# Patient Record
Sex: MALE | Race: WHITE | NOT HISPANIC OR LATINO | Employment: FULL TIME | URBAN - METROPOLITAN AREA
[De-identification: names, ages, dates, MRNs, and addresses within clinical notes are randomized per-mention and may not be internally consistent; named-entity substitution may affect disease eponyms.]

---

## 2017-04-26 DIAGNOSIS — I10 HYPERTENSION, BENIGN ESSENTIAL, GOAL BELOW 140/90: ICD-10-CM

## 2017-04-27 NOTE — TELEPHONE ENCOUNTER
Lisinopril      Last Written Prescription Date: 9/16/16  Last Fill Quantity: 90, # refills: 0  Last Office Visit with G, P or [facility] prescribing provider: 4/18/16  leonardo ladd         Potassium   Date Value Ref Range Status   04/18/2016 4.1 3.4 - 5.3 mmol/L Final     Creatinine   Date Value Ref Range Status   04/18/2016 0.86 0.66 - 1.25 mg/dL Final     BP Readings from Last 3 Encounters:   04/18/16 108/84   11/12/15 144/88   10/12/15 158/90

## 2017-04-28 RX ORDER — LISINOPRIL 10 MG/1
TABLET ORAL
Qty: 30 TABLET | Refills: 0 | Status: SHIPPED | OUTPATIENT
Start: 2017-04-28 | End: 2017-06-25

## 2017-04-28 NOTE — TELEPHONE ENCOUNTER
Reception please call patient to come in for annual blood pressure check and labs.  1 month of med sent to pharmacy per RN protocol.  Milagro Steele RN

## 2017-06-25 DIAGNOSIS — I10 HYPERTENSION, BENIGN ESSENTIAL, GOAL BELOW 140/90: ICD-10-CM

## 2017-06-26 RX ORDER — LISINOPRIL 10 MG/1
10 TABLET ORAL DAILY
Qty: 30 TABLET | Refills: 0 | Status: SHIPPED | OUTPATIENT
Start: 2017-06-26 | End: 2017-07-03

## 2017-06-26 NOTE — TELEPHONE ENCOUNTER
Patient received evelyn refill in [place]  Routing to provider and scheduling  Pended 1 month supply  Please call patient to schedule medication and BP check.  Milagro Steele RN

## 2017-06-26 NOTE — TELEPHONE ENCOUNTER
Medication Detail      Disp Refills Start End MOHINDER   lisinopril (PRINIVIL/ZESTRIL) 10 MG tablet 30 tablet 0 4/28/2017  No   Sig: TAKE ONE TABLET BY MOUTH ONE TIME DAILY       Last Office Visit with [facility], P or [facility] prescribing provider: 4/18/16       Potassium   Date Value Ref Range Status   04/18/2016 4.1 3.4 - 5.3 mmol/L Final     Creatinine   Date Value Ref Range Status   04/18/2016 0.86 0.66 - 1.25 mg/dL Final     BP Readings from Last 3 Encounters:   04/18/16 108/84   11/12/15 144/88   10/12/15 158/90

## 2017-06-30 NOTE — PATIENT INSTRUCTIONS
1.  Update labs today  2.  Increase lisinopril to 20mg (take 2 210mg tabs) once a day.  Follow up in 1 week for blood pressure and lab check.  Might need to increase dose to 40mg and/or add second medication  3. Call Central Scheduling [phone] to schedule  colonoscopies.   4.  Consider my fitness pal phone britt, online weight watchers, or whole 30.  We also discussed possibility of weight loss surgery    Preventive Health Recommendations  Male Ages 50   64    Yearly exam:             See your health care provider every year in order to  o   Review health changes.   o   Discuss preventive care.    o   Review your medicines if your doctor has prescribed any.     Have a cholesterol test every 5 years, or more frequently if you are at risk for high cholesterol/heart disease.     Have a diabetes test (fasting glucose) every three years. If you are at risk for diabetes, you should have this test more often.     Have a colonoscopy at age 50, or have a yearly FIT test (stool test). These exams will check for colon cancer.      Talk with your health care provider about whether or not a prostate cancer screening test (PSA) is right for you.    You should be tested each year for STDs (sexually transmitted diseases), if you re at risk.     Shots: Get a flu shot each year. Get a tetanus shot every 10 years.     Nutrition:    Eat at least 5 servings of fruits and vegetables daily.     Eat whole-grain bread, whole-wheat pasta and brown rice instead of white grains and rice.     Talk to your provider about Calcium and Vitamin D.     Lifestyle    Exercise for at least 150 minutes a week (30 minutes a day, 5 days a week). This will help you control your weight and prevent disease.     Limit alcohol to one drink per day.     No smoking.     Wear sunscreen to prevent skin cancer.     See your dentist every six months for an exam and cleaning.     See your eye doctor every 1 to 2 years.

## 2017-07-03 ENCOUNTER — OFFICE VISIT (OUTPATIENT)
Dept: FAMILY MEDICINE | Facility: CLINIC | Age: 52
End: 2017-07-03
Payer: COMMERCIAL

## 2017-07-03 VITALS
BODY MASS INDEX: 42.59 KG/M2 | WEIGHT: 297.5 LBS | TEMPERATURE: 98.4 F | OXYGEN SATURATION: 96 % | HEIGHT: 70 IN | DIASTOLIC BLOOD PRESSURE: 93 MMHG | SYSTOLIC BLOOD PRESSURE: 144 MMHG | HEART RATE: 95 BPM

## 2017-07-03 DIAGNOSIS — Z00.00 ENCOUNTER FOR ROUTINE ADULT HEALTH EXAMINATION WITHOUT ABNORMAL FINDINGS: Primary | ICD-10-CM

## 2017-07-03 DIAGNOSIS — E66.01 MORBID OBESITY, UNSPECIFIED OBESITY TYPE (H): ICD-10-CM

## 2017-07-03 DIAGNOSIS — Z13.6 CARDIOVASCULAR SCREENING; LDL GOAL LESS THAN 130: ICD-10-CM

## 2017-07-03 DIAGNOSIS — L30.9 ECZEMA, UNSPECIFIED TYPE: ICD-10-CM

## 2017-07-03 DIAGNOSIS — Z12.11 SCREEN FOR COLON CANCER: ICD-10-CM

## 2017-07-03 DIAGNOSIS — I10 HYPERTENSION, BENIGN ESSENTIAL, GOAL BELOW 140/90: ICD-10-CM

## 2017-07-03 LAB
ANION GAP SERPL CALCULATED.3IONS-SCNC: 9 MMOL/L (ref 3–14)
BUN SERPL-MCNC: 14 MG/DL (ref 7–30)
CALCIUM SERPL-MCNC: 9.3 MG/DL (ref 8.5–10.1)
CHLORIDE SERPL-SCNC: 105 MMOL/L (ref 94–109)
CHOLEST SERPL-MCNC: 176 MG/DL
CO2 SERPL-SCNC: 24 MMOL/L (ref 20–32)
CREAT SERPL-MCNC: 0.83 MG/DL (ref 0.66–1.25)
GFR SERPL CREATININE-BSD FRML MDRD: ABNORMAL ML/MIN/1.7M2
GLUCOSE SERPL-MCNC: 107 MG/DL (ref 70–99)
HDLC SERPL-MCNC: 58 MG/DL
LDLC SERPL CALC-MCNC: 68 MG/DL
NONHDLC SERPL-MCNC: 118 MG/DL
POTASSIUM SERPL-SCNC: 4.1 MMOL/L (ref 3.4–5.3)
SODIUM SERPL-SCNC: 138 MMOL/L (ref 133–144)
TRIGL SERPL-MCNC: 251 MG/DL

## 2017-07-03 PROCEDURE — 99396 PREV VISIT EST AGE 40-64: CPT | Performed by: NURSE PRACTITIONER

## 2017-07-03 PROCEDURE — 36415 COLL VENOUS BLD VENIPUNCTURE: CPT | Performed by: NURSE PRACTITIONER

## 2017-07-03 PROCEDURE — 80061 LIPID PANEL: CPT | Performed by: NURSE PRACTITIONER

## 2017-07-03 PROCEDURE — 80048 BASIC METABOLIC PNL TOTAL CA: CPT | Performed by: NURSE PRACTITIONER

## 2017-07-03 RX ORDER — LISINOPRIL 10 MG/1
20 TABLET ORAL DAILY
Qty: 30 TABLET | Refills: 1 | COMMUNITY
Start: 2017-07-03 | End: 2018-10-15

## 2017-07-03 RX ORDER — TRIAMCINOLONE ACETONIDE 1 MG/G
OINTMENT TOPICAL
Qty: 80 G | Refills: 1 | Status: SHIPPED | OUTPATIENT
Start: 2017-07-03 | End: 2017-12-11

## 2017-07-03 NOTE — NURSING NOTE
"Chief Complaint   Patient presents with     Physical       Initial BP (!) 144/93 (BP Location: Left arm, Patient Position: Chair, Cuff Size: Adult Large)  Pulse 95  Temp 98.4  F (36.9  C) (Oral)  Ht 5' 10\" (1.778 m)  Wt 297 lb 8 oz (134.9 kg)  SpO2 96%  BMI 42.69 kg/m2 Estimated body mass index is 42.69 kg/(m^2) as calculated from the following:    Height as of this encounter: 5' 10\" (1.778 m).    Weight as of this encounter: 297 lb 8 oz (134.9 kg).  Medication Reconciliation: complete     Giovanna Menjivar MA      "

## 2017-07-03 NOTE — MR AVS SNAPSHOT
After Visit Summary   7/3/2017    Aubrey Zavala    MRN: [id number]           Patient Information     Date Of Birth          1965        Visit Information        Provider Department      7/3/2017 8:40 AM Olivia Nunn APRN [facility]        Today's Diagnoses     Encounter for routine adult health examination without abnormal findings    -  1    Hypertension, benign essential, goal below 140/90        CARDIOVASCULAR SCREENING; LDL GOAL LESS THAN 130        Screen for colon cancer        Routine general medical examination at a health care facility        Eczema, unspecified type          Care Instructions    1.  Update labs today  2.  Increase lisinopril to 20mg (take 2 210mg tabs) once a day.  Follow up in 1 week for blood pressure and lab check.  Might need to increase dose to 40mg and/or add second medication  3. Call Central Scheduling [phone] to schedule  colonoscopies.   4.  Consider my fitness pal phone britt, online weight watchers, or whole 30.  We also discussed possibility of weight loss surgery    Preventive Health Recommendations  Male Ages 50 - 64    Yearly exam:             See your health care provider every year in order to  o   Review health changes.   o   Discuss preventive care.    o   Review your medicines if your doctor has prescribed any.     Have a cholesterol test every 5 years, or more frequently if you are at risk for high cholesterol/heart disease.     Have a diabetes test (fasting glucose) every three years. If you are at risk for diabetes, you should have this test more often.     Have a colonoscopy at age 50, or have a yearly FIT test (stool test). These exams will check for colon cancer.      Talk with your health care provider about whether or not a prostate cancer screening test (PSA) is right for you.    You should be tested each year for STDs (sexually transmitted diseases), if you re at risk.     Shots: Get a flu shot each year. Get  a tetanus shot every 10 years.     Nutrition:    Eat at least 5 servings of fruits and vegetables daily.     Eat whole-grain bread, whole-wheat pasta and brown rice instead of white grains and rice.     Talk to your provider about Calcium and Vitamin D.     Lifestyle    Exercise for at least 150 minutes a week (30 minutes a day, 5 days a week). This will help you control your weight and prevent disease.     Limit alcohol to one drink per day.     No smoking.     Wear sunscreen to prevent skin cancer.     See your dentist every six months for an exam and cleaning.     See your eye doctor every 1 to 2 years.            Follow-ups after your visit        Future tests that were ordered for you today     Open Future Orders        Priority Expected Expires Ordered    Basic metabolic panel Routine  7/3/2018 7/3/2017            Who to contact     If you have questions or need follow up information about today's clinic visit or your schedule please contact [facility] directly at [phone].  Normal or non-critical lab and imaging results will be communicated to you by ttwickhart, letter or phone within 4 business days after the clinic has received the results. If you do not hear from us within 7 days, please contact the clinic through [employer] or phone. If you have a critical or abnormal lab result, we will notify you by phone as soon as possible.  Submit refill requests through "[employer] or call your pharmacy and they will forward the refill request to us. Please allow 3 business days for your refill to be completed.          Additional Information About Your Visit        ttwickharFlip Flop ShopsÂ® Information     "[employer] gives you secure access to your electronic health record. If you see a primary care provider, you can also send messages to your care team and make appointments. If you have questions, please call your primary care clinic.  If you do not have a primary care provider, please call [phone] and they will assist  "you.        Care EveryWhere ID     This is your Care EveryWhere ID. This could be used by other organizations to access your [place] medical records  [id number]        Your Vitals Were     Pulse Temperature Height Pulse Oximetry BMI (Body Mass Index)       95 98.4  F (36.9  C) (Oral) 5' 10\" (1.778 m) 96% 42.69 kg/m2        Blood Pressure from Last 3 Encounters:   07/03/17 (!) 144/93   04/18/16 108/84   11/12/15 144/88    Weight from Last 3 Encounters:   07/03/17 297 lb 8 oz (134.9 kg)   04/18/16 300 lb (136.1 kg)   11/12/15 296 lb 4.8 oz (134.4 kg)              We Performed the Following     Basic metabolic panel     Lipid panel reflex to direct LDL          Today's Medication Changes          These changes are accurate as of: 7/3/17  9:20 AM.  If you have any questions, ask your nurse or doctor.               Start taking these medicines.        Dose/Directions    triamcinolone 0.1 % ointment   Commonly known as:  KENALOG   Used for:  Eczema, unspecified type   Started by:  Olivia Nunn APRN CNP        Apply sparingly to affected area three times daily for 14 days.   Quantity:  80 g   Refills:  1         These medicines have changed or have updated prescriptions.        Dose/Directions    lisinopril 10 MG tablet   Commonly known as:  PRINIVIL/ZESTRIL   This may have changed:  Another medication with the same name was removed. Continue taking this medication, and follow the directions you see here.   Changed by:  Olivia Nunn APRN CNP        Dose:  20 mg   Take 2 tablets (20 mg) by mouth daily   Quantity:  30 tablet   Refills:  1         Stop taking these medicines if you haven't already. Please contact your care team if you have questions.     cyclobenzaprine 10 MG tablet   Commonly known as:  FLEXERIL   Stopped by:  Olivia Nunn APRN CNP                Where to get your medicines      These medications were sent to [place] [address] [place] [address]  [address], [facility] [address]     Phone:  [phone]     triamcinolone 0.1 % ointment                Primary Care Provider Office Phone # Fax #    Lottie Jackson [phone]       [facility] [address]  [facility] [address]        Equal Access to Services     [facility] CenterZULEIKA : Hadii aad ku hadasho Soomaali, waaxda luqadaha, qaybta kaalmada adeegyada, waxay idiin hayaan adeeg kharash la'aan ah. So [facility] [phone].    ATENCIÓN: Si habla español, tiene a cullen disposición servicios gratuitos de asistencia lingüística. Jordan al [phone].    We comply with applicable federal civil rights laws and [place] laws. We do not discriminate on the basis of race, color, national origin, age, disability sex, sexual orientation or gender identity.            Thank you!     Thank you for choosing [facility]  for your care. Our goal is always to provide you with excellent care. Hearing back from our patients is one way we can continue to improve our services. Please take a few minutes to complete the written survey that you may receive in the mail after your visit with us. Thank you!             Your Updated Medication List - Protect others around you: Learn how to safely use, store and throw away your medicines at www.disposemymeds.org.          This list is accurate as of: 7/3/17  9:20 AM.  Always use your most recent med list.                   Brand Name Dispense Instructions for use Diagnosis    lisinopril 10 MG tablet    PRINIVIL/ZESTRIL    30 tablet    Take 2 tablets (20 mg) by mouth daily        triamcinolone 0.1 % ointment    KENALOG    80 g    Apply sparingly to affected area three times daily for 14 days.    Eczema, unspecified type

## 2017-07-03 NOTE — PROGRESS NOTES
SUBJECTIVE:   CC: Aubrey Zavala is an 52 year old male who presents for preventative health visit.     Physical   Annual:     Getting at least 3 servings of Calcium per day::  Yes    Bi-annual eye exam::  Yes    Dental care twice a year::  NO    Sleep apnea or symptoms of sleep apnea::  None    Diet::  Low salt and Vegetarian/vegan    Frequency of exercise::  None    Taking medications regularly::  Yes    Medication side effects::  None      History of hypertension, currently taking lisinopril without side effects.  Denies chest pain, palpitations, or shortness of breath.  Check at [employer], has been on the high side.  High blood pressure since age 31 but wasn't evaluated until later.  Brother also diagnosed at early age.  Does see eye doctor once a year.      No family history of colon cancer.    Eczema to left leg, intermittent, trying over the counter hydrocortisone without improvement    Has struggled with weight his whole life.  Trying to walk more.  Eats chicken and veggies.  Does diet pop.  Some convince/packaged foods.  Wife has done weight loss surgery      Today's PHQ-2 Score:   PHQ-2 ( 1999 Pfizer) 7/3/2017   Q1: Little interest or pleasure in doing things 0   Q2: Feeling down, depressed or hopeless 0   PHQ-2 Score 0   Q1: Little interest or pleasure in doing things Not at all   Q2: Feeling down, depressed or hopeless Not at all   PHQ-2 Score 0       Abuse: Current or Past(Physical, Sexual or Emotional)- No  Do you feel safe in your environment - Yes    Social History   Substance Use Topics     Smoking status: Never Smoker     Smokeless tobacco: Not on file     Alcohol use Yes      Comment: occ     The patient does not drink >3 drinks per day nor >7 drinks per week.    Last PSA: No results found for: PSA    Reviewed orders with patient. Reviewed health maintenance and updated orders accordingly - Yes  BP Readings from Last 3 Encounters:   07/03/17 (!) 144/93   04/18/16 108/84   11/12/15 144/88    Wt  "Readings from Last 3 Encounters:   07/03/17 297 lb 8 oz (134.9 kg)   04/18/16 300 lb (136.1 kg)   11/12/15 296 lb 4.8 oz (134.4 kg)                    Reviewed and updated as needed this visit by clinical staff  Tobacco  Allergies  Meds  Med Hx  Surg Hx  Fam Hx  Soc Hx        Reviewed and updated as needed this visit by Provider        Past Medical History:   Diagnosis Date     CARDIOVASCULAR SCREENING; LDL GOAL LESS THAN 130      Hypertension, benign essential, goal below 140/90 9/16/2016     Unspecified essential hypertension       Past Surgical History:   Procedure Laterality Date     CLOSED RX METATARSAL FX  1978     HC TOOTH EXTRACTION W/FORCEP         ROS:  C: NEGATIVE for fever, chills, change in weight  INTEGUMENTARY/SKIN: as above   E: NEGATIVE for vision changes or irritation  ENT: NEGATIVE for ear, mouth and throat problems  R: NEGATIVE for significant cough or SOB  CV: NEGATIVE for chest pain, palpitations or peripheral edema  GI: NEGATIVE for nausea, abdominal pain, heartburn, or change in bowel habits   male: negative for dysuria, hematuria, decreased urinary stream, erectile dysfunction, urethral discharge  M: NEGATIVE for significant arthralgias or myalgia  N: NEGATIVE for weakness, dizziness or paresthesias  P: NEGATIVE for changes in mood or affect    OBJECTIVE:   BP (!) 144/93 (BP Location: Left arm, Patient Position: Chair, Cuff Size: Adult Large)  Pulse 95  Temp 98.4  F (36.9  C) (Oral)  Ht 5' 10\" (1.778 m)  Wt 297 lb 8 oz (134.9 kg)  SpO2 96%  BMI 42.69 kg/m2    EXAM:  GENERAL: healthy, alert and no distress  EYES: Eyes grossly normal to inspection, PERRL and conjunctivae and sclerae normal  HENT: ear canals and TM's normal, nose and mouth without ulcers or lesions  NECK: no adenopathy, no asymmetry, masses, or scars and thyroid normal to palpation  RESP: lungs clear to auscultation - no rales, rhonchi or wheezes  CV: regular rate and rhythm, normal S1 S2, no S3 or S4, no murmur, " click or rub, no peripheral edema and peripheral pulses strong  ABDOMEN: soft, nontender, no hepatosplenomegaly, no masses and bowel sounds normal  MS: no gross musculoskeletal defects noted, no edema  SKIN: scaling dry skin to left anterior lower leg with excoriations present   NEURO: Normal strength and tone, mentation intact and speech normal  PSYCH: mentation appears normal, affect normal/bright    ASSESSMENT/PLAN:   (Z00.00) Encounter for routine adult health examination without abnormal findings  (primary encounter diagnosis)  Plan: routine    (I10) Hypertension, benign essential, goal below 140/90  Comment: uncontroilled  Plan: Basic metabolic panel, Basic metabolic panel        Increase lisinopril to 20mg and follow up in 1 week for blood pressure and lab recheck.  Discussed may need to increase dose again and eventually add second medication    (L30.9) Eczema, unspecified type  Plan: triamcinolone (KENALOG) 0.1 % ointment        Discussed avoiding soap, twice a day application of emoillent, and steroid cream twice a day for up to 2 weeks as needed for flares    (Z13.6) CARDIOVASCULAR SCREENING; LDL GOAL LESS THAN 130  Plan: Lipid panel reflex to direct LDL            (Z12.11) Screen for colon cancer  Plan: he will schedule colo    (E66.01) Morbid obesity, unspecified obesity type (H)  Plan: we had a long discussion regarding weight loss to prevent progression to diabetes.  Recommended focusing on portion control and minimizing carb intake, reviewed nutrition resource including my fitness pal, whole 30, and online weight watchers.  Recommended 30min exercise most days a week.  Discussed weight loss of 0.5-1 pound a week with overall goal of 10% weight loss.  Also discussed he may be a candidate for weight loss surgery.        COUNSELING:   Reviewed preventive health counseling, as reflected in patient instructions       Regular exercise       Healthy diet/nutrition         reports that he has never smoked.  "He does not have any smokeless tobacco history on file.    Estimated body mass index is 42.69 kg/(m^2) as calculated from the following:    Height as of this encounter: 5' 10\" (1.778 m).    Weight as of this encounter: 297 lb 8 oz (134.9 kg).   Weight management plan: Discussed healthy diet and exercise guidelines and patient will follow up in 12 months in clinic to re-evaluate.    Counseling Resources:  ATP IV Guidelines  Pooled Cohorts Equation Calculator  FRAX Risk Assessment  ICSI Preventive Guidelines  Dietary Guidelines for Americans, 2010  USDA's MyPlate  ASA Prophylaxis  Lung CA Screening    MAGED Diaz [facility]  Answers for HPI/ROS submitted by the patient on 7/3/2017   PHQ-2 Score: 0    "

## 2017-07-03 NOTE — PROGRESS NOTES
"Hi Vinh,    Your blood sugar was slightly elevated.  Values 100-125 are considered to be in the \"prediabetes\" range and those greater than 125 are a diagnosis of diabetes.  If you were not fasting it could be normal for your blood sugar to be slightly elevated.  Healthy diet and exercise to promote weight loss can help prevent progression to diabetes.  We will follow this test yearly.  Please let me know if you have any questions or concerns.    Your triglycerides are high.  To keep triglycerides in check,  limit sugar, juice, and white bread, pasta, rice and  cereal. Also cut down or eliminate alcohol in your diet.     You can also reduce your triglycerides by increasing your activity level and taking Omega 3 (fish oil supplements) or flax seed with  meals.    Normal kidney function and electrolytes.    I am faxing in your work paperwork today.    Olivia Nunn, CNP"

## 2017-07-06 NOTE — PROGRESS NOTES
SUBJECTIVE:                                                    Aubrey Zavala is a 52 year old male who presents to clinic today for the following health issues:      Hypertension Follow-up      Outpatient blood pressures are not being checked.    Low Salt Diet: no added salt      Amount of exercise or physical activity: 2-3 days/week for an average of 15-30 minutes    Problems taking medications regularly: No    Medication side effects: none    Diet: low salt      History of hypertension, lisinopril dose increased last week due to uncontrolled blood pressure.  Denies chest pain, palpitations, or shortness of breath.    Tolerating increased dose without side effects.     The 10-year ASCVD risk score (Jordisusana CANCHOLA Jr, et al., 2013) is: 3.4%    Values used to calculate the score:      Age: 52 years      Sex: Male      Is Non- : No      Diabetic: No      Tobacco smoker: No      Systolic Blood Pressure: 132 mmHg      Is BP treated: Yes      HDL Cholesterol: 58 mg/dL      Total Cholesterol: 176 mg/dL     Patient Active Problem List   Diagnosis     CARDIOVASCULAR SCREENING; LDL GOAL LESS THAN 130     Hypertension, benign essential, goal below 140/90     Past Surgical History:   Procedure Laterality Date     CLOSED RX METATARSAL FX  1978     HC TOOTH EXTRACTION W/FORCEP         Social History   Substance Use Topics     Smoking status: Never Smoker     Smokeless tobacco: Not on file     Alcohol use Yes      Comment: occ     Family History   Problem Relation Age of Onset     Hypertension Mother      Hypertension Brother          Current Outpatient Prescriptions   Medication Sig Dispense Refill     loratadine (ALAVERT) 10 MG tablet Take 10 mg by mouth daily       lisinopril (PRINIVIL/ZESTRIL) 20 MG tablet Take 1 tablet (20 mg) by mouth daily 90 tablet 3     triamcinolone (KENALOG) 0.1 % ointment Apply sparingly to affected area three times daily for 14 days. 80 g 1     lisinopril (PRINIVIL/ZESTRIL) 10 MG  tablet Take 2 tablets (20 mg) by mouth daily 30 tablet 1       ROS:  CV, Resp as above, otherwise negative       OBJECTIVE:                                                    /76 (BP Location: Right arm, Patient Position: Chair, Cuff Size: Adult Regular)  Pulse 88  Temp 97.6  F (36.4  C) (Tympanic)  Resp 16  Wt (!) 302 lb (137 kg)  SpO2 97%  BMI 43.33 kg/m2   GENERAL APPEARANCE: healthy, alert and no distress  EYES: Eyes grossly normal to inspection and conjunctivae and sclerae normal  RESP: respirations nonlabored  PSYCH: mentation appears normal and affect normal/bright     Office Visit on 07/03/2017   Component Date Value Ref Range Status     Cholesterol 07/03/2017 176  <200 mg/dL Final     Triglycerides 07/03/2017 251* <150 mg/dL Final    Comment: Borderline high:  150-199 mg/dl   High:             200-499 mg/dl   Very high:       >499 mg/dl       HDL Cholesterol 07/03/2017 58  >39 mg/dL Final     LDL Cholesterol Calculated 07/03/2017 68  <100 mg/dL Final    Desirable:       <100 mg/dl     Non HDL Cholesterol 07/03/2017 118  <130 mg/dL Final     Sodium 07/03/2017 138  133 - 144 mmol/L Final     Potassium 07/03/2017 4.1  3.4 - 5.3 mmol/L Final     Chloride 07/03/2017 105  94 - 109 mmol/L Final     Carbon Dioxide 07/03/2017 24  20 - 32 mmol/L Final     Anion Gap 07/03/2017 9  3 - 14 mmol/L Final     Glucose 07/03/2017 107* 70 - 99 mg/dL Final     Urea Nitrogen 07/03/2017 14  7 - 30 mg/dL Final     Creatinine 07/03/2017 0.83  0.66 - 1.25 mg/dL Final     GFR Estimate 07/03/2017   >60 mL/min/1.7m2 Final                    Value:>90  Non  GFR Calc       GFR Estimate If Black 07/03/2017   >60 mL/min/1.7m2 Final                    Value:>90   GFR Calc       Calcium 07/03/2017 9.3  8.5 - 10.1 mg/dL Final          ASSESSMENT/PLAN:                                                    (I10) Hypertension, benign essential, goal below 140/90  (primary encounter diagnosis)  Comment:  controlled on increased dose lisinopril  Plan: loratadine (ALAVERT) 10 MG tablet, Basic         metabolic panel, lisinopril (PRINIVIL/ZESTRIL)         20 MG tablet        Follow up BMP today, no med change    (R73.9) Elevated blood sugar  Plan: Hemoglobin A1c        Discussed weight loss to prevent progression to diabetes        See Patient Instructions    Olivia Nunn, [facility]    There are no Patient Instructions on file for this visit.

## 2017-07-10 ENCOUNTER — OFFICE VISIT (OUTPATIENT)
Dept: FAMILY MEDICINE | Facility: CLINIC | Age: 52
End: 2017-07-10
Payer: COMMERCIAL

## 2017-07-10 VITALS
BODY MASS INDEX: 43.33 KG/M2 | HEART RATE: 88 BPM | OXYGEN SATURATION: 97 % | RESPIRATION RATE: 16 BRPM | DIASTOLIC BLOOD PRESSURE: 76 MMHG | WEIGHT: 302 LBS | SYSTOLIC BLOOD PRESSURE: 132 MMHG | TEMPERATURE: 97.6 F

## 2017-07-10 DIAGNOSIS — I10 HYPERTENSION, BENIGN ESSENTIAL, GOAL BELOW 140/90: Primary | ICD-10-CM

## 2017-07-10 DIAGNOSIS — R73.9 ELEVATED BLOOD SUGAR: ICD-10-CM

## 2017-07-10 LAB — HBA1C MFR BLD: 5 % (ref 4.3–6)

## 2017-07-10 PROCEDURE — 83036 HEMOGLOBIN GLYCOSYLATED A1C: CPT | Performed by: NURSE PRACTITIONER

## 2017-07-10 PROCEDURE — 99213 OFFICE O/P EST LOW 20 MIN: CPT | Performed by: NURSE PRACTITIONER

## 2017-07-10 PROCEDURE — 80048 BASIC METABOLIC PNL TOTAL CA: CPT | Performed by: NURSE PRACTITIONER

## 2017-07-10 PROCEDURE — 36415 COLL VENOUS BLD VENIPUNCTURE: CPT | Performed by: NURSE PRACTITIONER

## 2017-07-10 RX ORDER — LORATADINE 10 MG/1
10 TABLET ORAL DAILY
COMMUNITY
End: 2021-03-12

## 2017-07-10 RX ORDER — LISINOPRIL 20 MG/1
20 TABLET ORAL DAILY
Qty: 90 TABLET | Refills: 3 | Status: SHIPPED | OUTPATIENT
Start: 2017-07-10 | End: 2018-08-17

## 2017-07-10 NOTE — MR AVS SNAPSHOT
After Visit Summary   7/10/2017    Aubrey Zavala    MRN: [id number]           Patient Information     Date Of Birth          1965        Visit Information        Provider Department      7/10/2017 1:00 PM Olivia Nunn APRN CNP [facility]        Today's Diagnoses     Hypertension, benign essential, goal below 140/90    -  1    Elevated blood sugar           Follow-ups after your visit        Your next 10 appointments already scheduled     Jul 10, 2017  1:30 PM CDT   LAB with  LAB   [facility] ([facility])    [address] [place] [address] [phone]           Patient must bring picture ID.  Patient should be prepared to give a urine specimen  Please do not eat 10-12 hours before your appointment if you are coming in fasting for labs on lipids, cholesterol, or glucose (sugar).  Pregnant women should follow their Care Team instructions. Water with medications is okay. Do not drink coffee or other fluids.   If you have concerns about taking  your medications, please ask at office or if scheduling via [employer], send a message by clicking on Secure Messaging, Message Your Care Team.              Who to contact     If you have questions or need follow up information about today's clinic visit or your schedule please contact [facility] directly at [phone].  Normal or non-critical lab and imaging results will be communicated to you by MyChart, letter or phone within 4 business days after the clinic has received the results. If you do not hear from us within 7 days, please contact the clinic through [employer] or phone. If you have a critical or abnormal lab result, we will notify you by phone as soon as possible.  Submit refill requests through [employer] or call your pharmacy and they will forward the refill request to us. Please allow 3 business days for your refill to be completed.          Additional  Information About Your Visit        [employer] Information     [employer] gives you secure access to your electronic health record. If you see a primary care provider, you can also send messages to your care team and make appointments. If you have questions, please call your primary care clinic.  If you do not have a primary care provider, please call [phone] and they will assist you.        Care EveryWhere ID     This is your Care EveryWhere ID. This could be used by other organizations to access your [facility] medical records  [id number]        Your Vitals Were     Pulse Temperature Respirations Pulse Oximetry BMI (Body Mass Index)       88 97.6  F (36.4  C) (Tympanic) 16 97% 43.33 kg/m2        Blood Pressure from Last 3 Encounters:   07/10/17 132/76   07/03/17 (!) 144/93   04/18/16 108/84    Weight from Last 3 Encounters:   07/10/17 (!) 302 lb (137 kg)   07/03/17 297 lb 8 oz (134.9 kg)   04/18/16 300 lb (136.1 kg)              We Performed the Following     Basic metabolic panel     Hemoglobin A1c          Today's Medication Changes          These changes are accurate as of: 7/10/17  1:17 PM.  If you have any questions, ask your nurse or doctor.               These medicines have changed or have updated prescriptions.        Dose/Directions    * lisinopril 10 MG tablet   Commonly known as:  PRINIVIL/ZESTRIL   This may have changed:  Another medication with the same name was added. Make sure you understand how and when to take each.   Changed by:  Olivia Nunn APRN CNP        Dose:  20 mg   Take 2 tablets (20 mg) by mouth daily   Quantity:  30 tablet   Refills:  1       * lisinopril 20 MG tablet   Commonly known as:  PRINIVIL/ZESTRIL   This may have changed:  You were already taking a medication with the same name, and this prescription was added. Make sure you understand how and when to take each.   Used for:  Hypertension, benign essential, goal below 140/90   Changed by:  Olivia Nunn APRN  CNP        Dose:  20 mg   Take 1 tablet (20 mg) by mouth daily   Quantity:  90 tablet   Refills:  3       * Notice:  This list has 2 medication(s) that are the same as other medications prescribed for you. Read the directions carefully, and ask your doctor or other care provider to review them with you.         Where to get your medicines      These medications were sent to [place] [address] [facility] - [facility] [address]  [address], [facility] [address]     Phone:  [phone]     lisinopril 20 MG tablet                Primary Care Provider Office Phone # Fax #    Olivia MAGED [facility] [phone]       [facility] [address]  [facility] [address]        Equal Access to Services     BRITTANY PEREZ : Clara ghosho Sorbo, waaxda luqadaha, qaybta kaalmada adeegyada, jerry diaz . So [facility] [phone].    ATENCIÓN: Si habla español, tiene a cullen disposición servicios gratuitos de asistencia lingüística. Llame al [phone].    We comply with applicable federal civil rights laws and [place] laws. We do not discriminate on the basis of race, color, national origin, age, disability sex, sexual orientation or gender identity.            Thank you!     Thank you for choosing [facility]  for your care. Our goal is always to provide you with excellent care. Hearing back from our patients is one way we can continue to improve our services. Please take a few minutes to complete the written survey that you may receive in the mail after your visit with us. Thank you!             Your Updated Medication List - Protect others around you: Learn how to safely use, store and throw away your medicines at www.disposemymeds.org.          This list is accurate as of: 7/10/17  1:17 PM.  Always use your most recent med list.                   Brand Name Dispense Instructions for use Diagnosis    ALAVERT 10 MG tablet   Generic  drug:  loratadine      Take 10 mg by mouth daily    Hypertension, benign essential, goal below 140/90       * lisinopril 10 MG tablet    PRINIVIL/ZESTRIL    30 tablet    Take 2 tablets (20 mg) by mouth daily        * lisinopril 20 MG tablet    PRINIVIL/ZESTRIL    90 tablet    Take 1 tablet (20 mg) by mouth daily    Hypertension, benign essential, goal below 140/90       triamcinolone 0.1 % ointment    KENALOG    80 g    Apply sparingly to affected area three times daily for 14 days.    Eczema, unspecified type       * Notice:  This list has 2 medication(s) that are the same as other medications prescribed for you. Read the directions carefully, and ask your doctor or other care provider to review them with you.

## 2017-07-10 NOTE — NURSING NOTE
"Chief Complaint   Patient presents with     Hypertension       Initial /76 (BP Location: Right arm, Patient Position: Chair, Cuff Size: Adult Regular)  Pulse 88  Temp 97.6  F (36.4  C) (Tympanic)  Resp 16  Wt (!) 302 lb (137 kg)  SpO2 97%  BMI 43.33 kg/m2 Estimated body mass index is 43.33 kg/(m^2) as calculated from the following:    Height as of 7/3/17: 5' 10\" (1.778 m).    Weight as of this encounter: 302 lb (137 kg).  Medication Reconciliation: complete     Yesika Starks, CMA    "

## 2017-07-11 LAB
ANION GAP SERPL CALCULATED.3IONS-SCNC: 12 MMOL/L (ref 3–14)
BUN SERPL-MCNC: 13 MG/DL (ref 7–30)
CALCIUM SERPL-MCNC: 8.8 MG/DL (ref 8.5–10.1)
CHLORIDE SERPL-SCNC: 105 MMOL/L (ref 94–109)
CO2 SERPL-SCNC: 23 MMOL/L (ref 20–32)
CREAT SERPL-MCNC: 0.9 MG/DL (ref 0.66–1.25)
GFR SERPL CREATININE-BSD FRML MDRD: 89 ML/MIN/1.7M2
GLUCOSE SERPL-MCNC: 111 MG/DL (ref 70–99)
POTASSIUM SERPL-SCNC: 4.3 MMOL/L (ref 3.4–5.3)
SODIUM SERPL-SCNC: 140 MMOL/L (ref 133–144)

## 2017-07-11 NOTE — PROGRESS NOTES
Results within acceptable limits.  Can wait for PCP to address   -Kidney function (GFR) is normal.  -Sodium is normal.  -Potassium is normal.  -Glucose is slight elevated and may be sign of early diabetes (prediabetes).

## 2017-07-12 NOTE — PROGRESS NOTES
Aubrey,    A1C (3 month blood glucose average) was in the normal range.  You do not have diabetes or prediabetes.    Normal kidney function and electrolytes.    Olivia Nunn, CNP

## 2017-08-27 ENCOUNTER — HOSPITAL ENCOUNTER (EMERGENCY)
Facility: CLINIC | Age: 52
Discharge: HOME | End: 2017-08-27
Attending: EMERGENCY MEDICINE | Admitting: EMERGENCY MEDICINE
Payer: COMMERCIAL

## 2017-08-27 ENCOUNTER — APPOINTMENT (OUTPATIENT)
Dept: GENERAL RADIOLOGY | Facility: CLINIC | Age: 52
End: 2017-08-27
Attending: EMERGENCY MEDICINE
Payer: COMMERCIAL

## 2017-08-27 ENCOUNTER — NURSE TRIAGE (OUTPATIENT)
Dept: NURSING | Facility: CLINIC | Age: 52
End: 2017-08-27

## 2017-08-27 VITALS
SYSTOLIC BLOOD PRESSURE: 125 MMHG | HEIGHT: 70 IN | TEMPERATURE: 98.7 F | BODY MASS INDEX: 43.38 KG/M2 | HEART RATE: 88 BPM | RESPIRATION RATE: 16 BRPM | WEIGHT: 303 LBS | DIASTOLIC BLOOD PRESSURE: 78 MMHG | OXYGEN SATURATION: 93 %
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
## 2017-08-27 LAB
ALBUMIN UR-MCNC: NEGATIVE MG/DL
APPEARANCE UR: CLEAR
BILIRUB UR QL STRIP: NEGATIVE
COLOR UR AUTO: YELLOW
GLUCOSE UR STRIP-MCNC: NEGATIVE MG/DL
HGB UR QL STRIP: NEGATIVE
KETONES UR STRIP-MCNC: NEGATIVE MG/DL
LEUKOCYTE ESTERASE UR QL STRIP: NEGATIVE
MUCOUS THREADS #/AREA URNS LPF: PRESENT /LPF
NITRATE UR QL: NEGATIVE
PH UR STRIP: 5 PH (ref 5–7)
RBC #/AREA URNS AUTO: <1 /HPF (ref 0–2)
SOURCE: ABNORMAL
SP GR UR STRIP: 1.02 (ref 1–1.03)
SQUAMOUS #/AREA URNS AUTO: <1 /HPF (ref 0–1)
UROBILINOGEN UR STRIP-MCNC: NORMAL MG/DL (ref 0–2)
WBC #/AREA URNS AUTO: 1 /HPF (ref 0–2)

## 2017-08-27 PROCEDURE — 71101 X-RAY EXAM UNILAT RIBS/CHEST: CPT | Mod: LT

## 2017-08-27 PROCEDURE — 81001 URINALYSIS AUTO W/SCOPE: CPT | Performed by: EMERGENCY MEDICINE

## 2017-08-27 PROCEDURE — 72100 X-RAY EXAM L-S SPINE 2/3 VWS: CPT

## 2017-08-27 PROCEDURE — 25000132 ZZH RX MED GY IP 250 OP 250 PS 637: Performed by: EMERGENCY MEDICINE

## 2017-08-27 PROCEDURE — 99285 EMERGENCY DEPT VISIT HI MDM: CPT

## 2017-08-27 RX ORDER — IBUPROFEN 600 MG/1
600 TABLET, FILM COATED ORAL ONCE
Status: COMPLETED | OUTPATIENT
Start: 2017-08-27 | End: 2017-08-27

## 2017-08-27 RX ORDER — OXYCODONE AND ACETAMINOPHEN 5; 325 MG/1; MG/1
1 TABLET ORAL ONCE
Status: COMPLETED | OUTPATIENT
Start: 2017-08-27 | End: 2017-08-27

## 2017-08-27 RX ORDER — HYDROCODONE BITARTRATE AND ACETAMINOPHEN 5; 325 MG/1; MG/1
1 TABLET ORAL EVERY 8 HOURS PRN
Qty: 6 TABLET | Refills: 0 | Status: SHIPPED | OUTPATIENT
Start: 2017-08-27 | End: 2018-10-15

## 2017-08-27 RX ADMIN — IBUPROFEN 600 MG: 600 TABLET ORAL at 20:23

## 2017-08-27 RX ADMIN — OXYCODONE HYDROCHLORIDE AND ACETAMINOPHEN 1 TABLET: 5; 325 TABLET ORAL at 20:09

## 2017-08-27 ASSESSMENT — ENCOUNTER SYMPTOMS
NUMBNESS: 0
BACK PAIN: 1
HEMATURIA: 0
WEAKNESS: 0
NECK PAIN: 0

## 2017-08-27 NOTE — ED AVS SNAPSHOT
Emergency Department    [address] [place] [address]    Phone:  [phone]    Fax:  [phone]                                       Aubrey Zavala   MRN: [id number]    Department:   Emergency Department   Date of Visit:  8/27/2017           After Visit Summary Signature Page     I have received my discharge instructions, and my questions have been answered. I have discussed any challenges I see with this plan with the nurse or doctor.    ..........................................................................................................................................  Patient/Patient Representative Signature      ..........................................................................................................................................  Patient Representative Print Name and Relationship to Patient    ..................................................               ................................................  Date                                            Time    ..........................................................................................................................................  Reviewed by Signature/Title    ...................................................              ..............................................  Date                                                            Time

## 2017-08-27 NOTE — ED AVS SNAPSHOT
Emergency Department    [address] [facility] [address]    Phone:  [phone]    Fax:  [phone]                                       Aubrey Zavala   MRN: [id number]    Department:   Emergency Department   Date of Visit:  8/27/2017           Patient Information     Date Of Birth          1965        Your diagnoses for this visit were:     Fall, initial encounter        You were seen by Shelly Dorsey MD.      Follow-up Information     Follow up with Olivia Nunn APRN CNP. Schedule an appointment as soon as possible for a visit in 2 days.    Specialty:  Nurse Practitioner - Family    Contact information:    [address] [facility] [address] [phone]          Follow up with  Emergency Department.    Specialty:  EMERGENCY MEDICINE    Why:  As needed, If symptoms worsen    Contact information:    [address] [facility] [id number] [phone]        Discharge Instructions       Discharge Instructions  Trauma    You were seen today for an injury due to some kind of trauma (crash, fall, etc.).  Some injuries may not show up until after you leave the Emergency Department.  It is important that you pay attention to these instructions and follow-up with your regular doctor as instructed.    Return to the Emergency Department right away if:    You have abdominal pain or bruises, chest pain, pain in a new area, or pain that is getting worse.    You get short of breath.    You develop a fever over 101 degrees.    You have weakness in your arms or legs.    You faint or you are very lightheaded.    You have any new symptoms, you are feeling weak or unusually ill, or something worries you.     Injuries to the brain are possible with any accident.  Return right away if you have confusion, vomiting more than once, difficulty walking or a headache that is getting worse. Bring a child or a person who can t talk back if they seem to be behaving in an abnormal  way.      MORE INFORMATION:    General Injuries:    Aches and pains are usually worse the day after your accident, but should not be severe, and should start getting better after that. Aches and pains are common in the neck and back.    Injuries from your accident may prevent you from working.  Follow-up with your regular doctor to get a work note and to find out how long you will not be able to work.    Pain medications or your injuries may make it unsafe for you to drive or operate machinery.    Use ice to injured areas for the first one or two days. Apply a bag of ice wrapped in a cloth for about 15 minutes at a time. You can do this as often as once an hour. Do not sleep with an ice pack, since it can burn you.     You can use non-prescription pain medicine, like Tylenol  (acetaminophen), Advil  (ibuprofen), Motrin  (ibuprofen), Nuprin  (ibuprofen) if your emergency doctor or your own doctor told you this is okay. Tylenol  (acetaminophen) is in many prescription medicines and non-prescription medicines--check all of your medicines to be sure you aren t taking more than 3000 mg per day.    Limit your activity for at least one or two days. Avoid doing things that hurt.    You need to see your doctor if any injured area is not back to normal in 1 week.    Car Accident:    If you have been on a backboard or had a neck collar on, this may make you stiff and sore.  This should get better in 1-2 days.  Return to the Emergency Department if the pain or discomfort is severe or gets worse.    Be careful of shards of glass on your body or in your belongings.    Fractures, Sprains, and Strains:    Return to the Emergency Department right away if your injured area gets more painful, if the splint or dressing seems to be too tight, if it gets numb or tingly past the injury, or if the area past the injury gets pale, blue, or cold.    Use your crutches if you were given them today. Don t put weight on the injured area until the  pain is gone.    Keep the injured area above the level of your heart while laying or sitting down.  This well help lessen the swelling (puffiness) and the pain.    You may use an elastic bandage (Ace  Wrap) if it makes you more comfortable. Wrap it just tight enough to provide mild compression, and loosen it if you get swelling past the bandage.    Note about X-rays: If you had x-rays done today, they were read by your emergency physician. They will also be read later by a radiologist. We will contact you if the radiologist thinks they show something different than the emergency physician did. Remember that there are some fractures (breaks in the bone) that can t be seen right away. Even if your x-rays today were normal, you must see your doctor in clinic to re-check.     Splints:    A splint put on in the Emergency Department is temporary. Your regular doctor or orthopedic doctor will remove it, and replace it with a cast or boot if needed.    Keep the splint dry. Cover it with a plastic bag when you wash. Even with a plastic bag, you still can t get in water or let water get right on it. If it does get wet, you should come back or see your doctor to have it replaced.    Do not put objects inside the splint to scratch.    If there is an elastic bandage (Ace  Wrap) holding the splint on this may be loosened a little to relieve pressure or pain.  If pain continues return to the Emergency Department right away.    Return if the splint starts cutting into your skin.    Do not remove your splint by yourself unless told to by your doctor. You can t take it off and put it back on again.     Wounds:    Infections can follow many injuries. Watch for fevers, redness spreading from the wound, pus or stitches that open up. Return here or see your doctor if these happen.    There can always be glass, wood, dirt or other things in any wound.  They won t always show up even on x-rays.  If a wound doesn t heal, this may be why,  and it is important to follow-up with your regular doctor. Small pieces of glass or other materials may work their way out on their own.    Cuts or scrapes may start to bleed after leaving the Emergency Department.  If this happens, hold pressure on the bleeding area with a clean cloth or put pressure over the bandage.  If the bleeding doesn t stop after you use constant pressure for   hour, you should return to the Emergency Department for further treatment.    Any bandage or dressing put on here should be removed in 12-24 hours, or as your doctor instructs. Remove the dressing sooner if it seems too tight or painful, or if it is getting numb, tingly, or pale past the dressing.    After you take off the dressing, wash the cut or scrape with soap and water once or twice a day.    Apply ointment like Bacitracin  (polypeptide antibiotic) to scrapes or cuts, and keep them covered with a Band-Aid  or gauze if possible, until they heal up or until your stitches are taken out.    Dermabond  or Steri-Strips  should be left alone and will come off by themselves.  Dissolving stitches should go away or fall out within about a week.    Regular stitches need to be taken out by your doctor in clinic.  Call today and schedule an appointment.  Leave your stitches in for as long as you were told today.    Most injuries are preventable!  As your local emergency physicians, we encourage you to:    Wear your seat belt.    Do not talk on your cell phone while driving.    Do not read or send text messages while driving.    Wear a bike or motorcycle helmet.    Wear a helmet while skiing and snowboarding.    Wear personal flotation devices at all times while on the water.    Always have your child in a car seat.    Do not allow children less than 12 years old to ride in the front seat.    Go to the CDC website to find more information on preventing injures:  http://www.cdc.gov/injury/index.html    If you were given a prescription for  medicine here today, be sure to read all of the information (including the package insert) that comes with your prescription.  This will include important information about the medicine, its side effects, and any warnings that you need to know about.  The pharmacist who fills the prescription can provide more information and answer questions you may have about the medicine.  If you have questions or concerns that the pharmacist cannot address, please call or return to the Emergency Department.     Opioid Medication Information    Pain medications are among the most commonly prescribed medicines, so we are including this information for all our patients. If you did not receive pain medication or get a prescription for pain medicine, you can ignore it.     You may have been given a prescription for an opioid (narcotic) pain medicine and/or have received a pain medicine while here in the Emergency Department. These medicines can make you drowsy or impaired. You must not drive, operate dangerous equipment, or engage in any other dangerous activities while taking these medications. If you drive while taking these medications, you could be arrested for DUI, or driving under the influence. Do not drink any alcohol while you are taking these medications.     Opioid pain medications can cause addiction. If you have a history of chemical dependency of any type, you are at a higher risk of becoming addicted to pain medications.  Only take these prescribed medications to treat your pain when all other options have been tried. Take it for as short a time and as few doses as possible. Store your pain pills in a secure place, as they are frequently stolen and provide a dangerous opportunity for children or visitors in your house to start abusing these powerful medications. We will not replace any lost or stolen medicine.  As soon as your pain is better, you should flush all your remaining medication.     Many prescription pain  medications contain Tylenol  (acetaminophen), including Vicodin , Tylenol #3 , Norco , Lortab , and Percocet .  You should not take any extra pills of Tylenol  if you are using these prescription medications or you can get very sick.  Do not ever take more than 3000 mg of acetaminophen in any 24 hour period.    All opioids tend to cause constipation. Drink plenty of water and eat foods that have a lot of fiber, such as fruits, vegetables, prune juice, apple juice and high fiber cereal.  Take a laxative if you don t move your bowels at least every other day. Miralax , Milk of Magnesia, Colace , or Senna  can be used to keep you regular.      Remember that you can always come back to the Emergency Department if you are not able to see your regular doctor in the amount of time listed above, if you get any new symptoms, or if there is anything that worries you.          24 Hour Appointment Hotline       To make an appointment at any [facility], call [phone] ([phone]). If you don't have a family doctor or clinic, we will help you find one. [facility] are conveniently located to serve the needs of you and your family.             Review of your medicines      START taking        Dose / Directions Last dose taken    HYDROcodone-acetaminophen 5-325 MG per tablet   Commonly known as:  NORCO   Dose:  1 tablet   Quantity:  6 tablet        Take 1 tablet by mouth every 8 hours as needed for moderate to severe pain   Refills:  0          Our records show that you are taking the medicines listed below. If these are incorrect, please call your family doctor or clinic.        Dose / Directions Last dose taken    ALAVERT 10 MG tablet   Dose:  10 mg   Generic drug:  loratadine        Take 10 mg by mouth daily   Refills:  0        * lisinopril 10 MG tablet   Commonly known as:  PRINIVIL/ZESTRIL   Dose:  20 mg   Quantity:  30 tablet        Take 2 tablets (20 mg) by mouth daily   Refills:  1        * lisinopril  20 MG tablet   Commonly known as:  PRINIVIL/ZESTRIL   Dose:  20 mg   Quantity:  90 tablet        Take 1 tablet (20 mg) by mouth daily   Refills:  3        triamcinolone 0.1 % ointment   Commonly known as:  KENALOG   Quantity:  80 g        Apply sparingly to affected area three times daily for 14 days.   Refills:  1        * Notice:  This list has 2 medication(s) that are the same as other medications prescribed for you. Read the directions carefully, and ask your doctor or other care provider to review them with you.            Prescriptions were sent or printed at these locations (1 Prescription)                   Other Prescriptions                Printed at Department/Unit printer (1 of 1)         HYDROcodone-acetaminophen (NORCO) 5-325 MG per tablet                Procedures and tests performed during your visit     Lumbar spine XR, 2-3 views    Ribs XR, unilat 3 views + PA chest,  left    UA with Microscopic      Orders Needing Specimen Collection     None      Pending Results     No orders found from 8/25/2017 to 8/28/2017.            Pending Culture Results     No orders found from 8/25/2017 to 8/28/2017.            Pending Results Instructions     If you had any lab results that were not finalized at the time of your Discharge, you can call the ED Lab Result RN at [phone]. You will be contacted by this team for any positive Lab results or changes in treatment. The nurses are available 7 days a week from 10A to 6:30P.  You can leave a message 24 hours per day and they will return your call.        Test Results From Your Hospital Stay        8/27/2017  9:39 PM      Narrative     RIBS AND CHEST LEFT THREE VIEW   8/27/2017 8:56 PM     HISTORY: Fall landing on left side. Rib pain.    COMPARISON: 12/13/2013        Impression     IMPRESSION: Negative exam.    QUEENIE GALINDO MD         8/27/2017  9:38 PM      Narrative     LUMBAR SPINE TWO - THREE VIEWS   8/27/2017 8:56 PM     HISTORY: Fall, pain.    COMPARISON:  None.        Impression     IMPRESSION: Mild to moderate multilevel bridging syndesmophytes are  present along with some degenerative facet disease. There is no  evidence for fracture. Posterior alignment is normal.    QUEENIE GALINDO MD         8/27/2017 10:54 PM      Component Results     Component Value Ref Range & Units Status    Color Urine Yellow  Final    Appearance Urine Clear  Final    Glucose Urine Negative NEG^Negative mg/dL Final    Bilirubin Urine Negative NEG^Negative Final    Ketones Urine Negative NEG^Negative mg/dL Final    Specific Gravity Urine 1.023 1.003 - 1.035 Final    Blood Urine Negative NEG^Negative Final    pH Urine 5.0 5.0 - 7.0 pH Final    Protein Albumin Urine Negative NEG^Negative mg/dL Final    Urobilinogen mg/dL Normal 0.0 - 2.0 mg/dL Final    Nitrite Urine Negative NEG^Negative Final    Leukocyte Esterase Urine Negative NEG^Negative Final    Source Midstream Urine  Final    WBC Urine 1 0 - 2 /HPF Final    RBC Urine <1 0 - 2 /HPF Final    Squamous Epithelial /HPF Urine <1 0 - 1 /HPF Final    Mucous Urine Present (A) NEG^Negative /LPF Final                Clinical Quality Measure: Blood Pressure Screening     Your blood pressure was checked while you were in the emergency department today. The last reading we obtained was  BP: 125/78 . Please read the guidelines below about what these numbers mean and what you should do about them.  If your systolic blood pressure (the top number) is less than 120 and your diastolic blood pressure (the bottom number) is less than 80, then your blood pressure is normal. There is nothing more that you need to do about it.  If your systolic blood pressure (the top number) is 120-139 or your diastolic blood pressure (the bottom number) is 80-89, your blood pressure may be higher than it should be. You should have your blood pressure rechecked within a year by a primary care provider.  If your systolic blood pressure (the top number) is 140 or greater or your  diastolic blood pressure (the bottom number) is 90 or greater, you may have high blood pressure. High blood pressure is treatable, but if left untreated over time it can put you at risk for heart attack, stroke, or kidney failure. You should have your blood pressure rechecked by a primary care provider within the next 4 weeks.  If your provider in the emergency department today gave you specific instructions to follow-up with your doctor or provider even sooner than that, you should follow that instruction and not wait for up to 4 weeks for your follow-up visit.        Thank you for choosing [facility]       Thank you for choosing [facility] for your care. Our goal is always to provide you with excellent care. Hearing back from our patients is one way we can continue to improve our services. Please take a few minutes to complete the written survey that you may receive in the mail after you visit with us. Thank you!        [facility] Information     [facility] gives you secure access to your electronic health record. If you see a primary care provider, you can also send messages to your care team and make appointments. If you have questions, please call your primary care clinic.  If you do not have a primary care provider, please call [phone] and they will assist you.        Care EveryWhere ID     This is your Care EveryWhere ID. This could be used by other organizations to access your [facility] medical records  [id number]        Equal Access to Services     BRITTANY PEREZ : Clara Ortega, waaxda luqadaha, qaybta kaalmada adejose enrique, jerry corado. So [facility] [phone].    ATENCIÓN: Si habla español, tiene a cullen disposición servicios gratuitos de asistencia lingüística. Llame al [phone].    We comply with applicable federal civil rights laws and [place] laws. We do not discriminate on the basis of race, color, national origin, age, disability sex, sexual orientation or  gender identity.            After Visit Summary       This is your record. Keep this with you and show to your community pharmacist(s) and doctor(s) at your next visit.

## 2017-08-28 NOTE — ED PROVIDER NOTES
"  History     Chief Complaint:  Fall     HPI   Aubrey Zavala is a 52 year old male who presents with wife for evaluation of a fall. The patient was pressure washing while 3-4 feet up on a ladder this evening when he lost his balance and slipped, subsequently falling onto cement and landing on his left side.  He had no injury from the .  No head trauma or loss of consciousness. He complains of subsequent left thoracolumbar back pain with \"shooting\" pain radiating up his back diffusely and down his left leg. He denies any neck pain, other pains, focal numbness or weakness, hematuria, bowel or bladder dysfunction, or any other acute symptoms. He took a muscle spasm tablet left over from previous encounter for back pain several years ago, without significant relief of pain. He is not on any chronic anticoagulation.      Allergies:  NKDA     Medications:    Lisinopril  Loratadine    Past Medical History:    Hypertension     Past Surgical History:    History reviewed. No pertinent surgical history.     Family History:    hypertension (parents)    Social History:  Non-smoker.   Marital Status:   [2]     Review of Systems   Genitourinary: Negative for hematuria.   Musculoskeletal: Positive for back pain and gait problem. Negative for neck pain.   Neurological: Negative for syncope, weakness and numbness.   10 point review of systems performed and is negative except as above and in HPI.     Physical Exam     Patient Vitals for the past 24 hrs:   BP Temp Temp src Pulse Heart Rate Resp SpO2 Height Weight   08/27/17 2200 - - - 88 - - 93 % - -   08/27/17 2153 125/78 - - - - - - - -   08/27/17 1948 (!) 149/97 98.7  F (37.1  C) Oral - 107 20 95 % 1.778 m (5' 10\") (!) 137.4 kg (303 lb)       Physical Exam  General: Standing in room, appears somewhat uncomfortable  Head:  The scalp, face, and head appear normal  Mouth/Throat: Mucus membranes are moist  CV:  Regular rate    Normal S1 and S2  No pathological " murmur   Resp:  Breath sounds clear and equal bilaterally    Non-labored, no retractions or accessory muscle use    No coarseness    No wheezing     Left lateral rib tenderness to palpation.  GI:  Abdomen is soft, no rigidity    No CVA tenderness to percussion    No tenderness to palpation  MS:  Midline low back tenderness to palpation.     Normal motor assessment of all extremities.    Good capillary refill noted.  Skin:   No rash or lesions noted.  Neuro: Speech is normal and fluent. No apparent deficit. GCS 15.    No paresthesias to the lower extremities. Normal gait.      Emergency Department Course   Imaging:  Radiographic findings were communicated with the patient and wife who voiced understanding of the findings.  XR L-spine: Mild to moderate multilevel bridging syndesmophytes are present along with some degenerative facet disease. There is no evidence for fracture. Posterior alignment is normal. Results per Radiology.     Ribs XR + PA chest, left: negative exam. Results per Radiology.     Laboratory:  UA: mucous present, o/w Negative    Interventions:  2009: Percocet 5-325 mg tablet, PO   2023: ibuprofen 600mg, PO    Emergency Department Course:  Past medical records, nursing notes, and vitals reviewed.   2001: I performed an exam of the patient as documented above.   The above imaging studies and labs  were obtained. The patient was given the above interventions with improvement.  The patient provided a urine sample here in the emergency department. This was sent for laboratory testing, findings above.  2200: I rechecked patient. Clinical findings and plan explained to the Patient and spouse. Patient discharged home with instructions regarding supportive care, medications, and reasons to return as well as the importance of close follow-up were reviewed.      Impression & Plan    Medical Decision Making:    Aubrey Zavala is a 52 year old male presents for evaluation after falling off a ladder as noted  above.  Signs and symptoms are consistent with a rib contusion.  CXR with rib series was obtained and shows no evidence of fracture or pneumothorax. Oxygen saturations are normal. UA is negative for hematuria, making acute kidney injury less likely. The patient's head to toe trauma exam is otherwise negative for serious underlying disease of the head, neck, chest, abdomen, extremities, pelvis.  We will provide pain medications for better control of pain.  Close follow-up with patient's primary care physician per discharge precautions. Contusion discharge instructions given for home.     Diagnosis:    ICD-10-CM    1. Fall, initial encounter W19.XXXA UA with Microscopic       Disposition:  discharged to home    Discharge Medications:  New Prescriptions    HYDROCODONE-ACETAMINOPHEN (NORCO) 5-325 MG PER TABLET    Take 1 tablet by mouth every 8 hours as needed for moderate to severe pain       Carlos LAFLEUR, marcelo serving as a scribe at 7:53 PM on 8/27/2017 to document services personally performed by Shelly Dorsey MD based on my observations and the provider's statements to me.      Carlos Powell  8/27/2017    EMERGENCY DEPARTMENT       Shelly Dorsey MD  08/29/17 1058

## 2017-08-28 NOTE — ED NOTES
Pt states constant pain is improving, still c/o intermittent sharp pains, but states he is able to move a little better.

## 2017-08-28 NOTE — TELEPHONE ENCOUNTER
"  Reason for Disposition    [1] SEVERE PAIN in kidney area (flank) AND [2] follows direct blow to that site    Additional Information    Negative: Dangerous mechanism of injury (e.g., MVA, contact sports, trampoline, diving, fall > 10 feet or 3 meters)  (Exception: back pain began > 1 hour after injury)    Negative: [1] Weakness (i.e., paralysis, loss of muscle strength) of the leg(s) or foot AND [2] sudden onset after back injury    Negative: [1] Numbness (i.e., loss of sensation) of the leg(s) or foot AND [2] sudden onset after back injury    Negative: [1] Major bleeding (e.g., actively dripping or spurting) AND [2] can't be stopped    Negative: Bullet wound, knife wound, or other penetrating object    Negative: Shock suspected (e.g., cold/pale/clammy skin, too weak to stand, low BP, rapid pulse)    Negative: Sounds like a life-threatening emergency to the triager    Negative: [1] Injuries at more than 1 site AND [2] unsure which guideline to use    Negative: Injury to the neck    Negative: Injury to the tailbone    Negative: Back pain not from an injury    Negative: Back pain from overuse (work, exercise, gardening) OR from twisting, lifting, or bending injury    Negative: Blood in urine (red, pink, or tea-colored)    Negative: [1] Unable to urinate (or only a few drops) > 4 hours AND     [2] bladder feels very full (e.g., palpable bladder or strong urge to urinate)    Negative: [1] Urinary or bowel incontinence (i.e., loss of bladder or bowel control) AND [2] new onset    Negative: Numbness (loss of sensation) in groin or rectal area    Negative: Skin is split open or gaping  (or length > 1/2 inch or 12 mm)    Negative: Puncture wound of back    Negative: [1] Bleeding AND [2] won't stop after 10 minutes of direct pressure (using correct technique)    Negative: Sounds like a serious injury to the triager    Protocols used: BACK INJURY-ADULT-    Aubrey questioning if he \"should go to urgent care or ER?\" for his " "injury. He reports around an hour ago, he was \"pressure washing and fell about 4 feet from a ladder onto concrete\", landing on the \"right side\" of his body. Aubrey reports he can walk \"slowly due to the pain\" and can bear weight on both legs. He denies numbness or tingling in either leg at this time but is rating his back pain \"8/10\" and that he \"took a muscle relaxer.\" Aubrey describes his pain as \"shooting up and down\" his back and he is having \"difficulty\" moving due to the pain. He denies previous back surgery, MVA or back injuries but does report a history of back pain which wasn't evaluated. Care advice given per guideline protocol; advised Aubrey to be seen in ER now and to have another adult drive. Aubrey verbalized understanding of care advice and plans to go to [facility].     Laura Trimble RN  [facility]    "

## 2017-08-30 NOTE — PROGRESS NOTES
SUBJECTIVE:   Aubrey Zavala is a 52 year old male who presents to clinic today for the following health issues:      ED/UC Followup:    Facility:  [facility]  Date of visit: 8/27/17  Reason for visit: fall from ladder, aprox 4-5 feet off the ground. Landed on right hip  Current Status: having sharp left sided low-back sharp shooting pains, sometimes extending to mid back. Any movement exacerbates pain, unable to lay on left side or flat on back when sleeping. Taking [place] once nightly for sleep, alternating ibuprofen and tylenol during day. Pt is wondering about muscle relaxer for night.       Evaluated in ER 8/27 after 3-4 foot fall off ladder, landed on cement on his right side.  No head trama.  Xray lumbar spine revealed Mild to moderate multilevel bridging syndesmophytes are present along with some degenerative facet disease. There is no evidence for fracture. Posterior alignment is normal.   Neg right rib and PA chest xray.  Discharged with norco for pain management.      Update today:  Landed on right side but having pain on left side.    Missed work 8/29, cannot lay on back or left side, can only sleep on right side.  Pain to left lumbar spine, radiating into buttocks, does not radiate into leg.  Pain is intermittent, worse when getting up from sitting position, worse with laying down, hurts when he hits a bump in the car.      Occasional intermittent pain to right hip.    Has had midline lumbar pain with lifting things in the past.      Returned to work Tuesday.  Patient Active Problem List   Diagnosis     CARDIOVASCULAR SCREENING; LDL GOAL LESS THAN 130     Hypertension, benign essential, goal below 140/90     Past Surgical History:   Procedure Laterality Date     CLOSED RX METATARSAL FX  1978     HC TOOTH EXTRACTION W/FORCEP         Social History   Substance Use Topics     Smoking status: Never Smoker     Smokeless tobacco: Never Used     Alcohol use Yes      Comment: occ     Family History   Problem Relation  Age of Onset     Hypertension Mother      Hypertension Brother          Current Outpatient Prescriptions   Medication Sig Dispense Refill     cyclobenzaprine (FLEXERIL) 10 MG tablet Take 0.5-1 tablets (5-10 mg) by mouth 3 times daily as needed for muscle spasms 30 tablet 1     HYDROcodone-acetaminophen (NORCO) 5-325 MG per tablet Take 1 tablet by mouth every 8 hours as needed for moderate to severe pain 6 tablet 0     lisinopril (PRINIVIL/ZESTRIL) 20 MG tablet Take 1 tablet (20 mg) by mouth daily 90 tablet 3     triamcinolone (KENALOG) 0.1 % ointment Apply sparingly to affected area three times daily for 14 days. 80 g 1     loratadine (ALAVERT) 10 MG tablet Take 10 mg by mouth daily       lisinopril (PRINIVIL/ZESTRIL) 10 MG tablet Take 2 tablets (20 mg) by mouth daily 30 tablet 1       ROS:  MSK, neuro as above, otherwise negative       OBJECTIVE:                                                    /74 (BP Location: Left arm, Patient Position: Chair, Cuff Size: Adult Large)  Pulse 75  Temp 97.4  F (36.3  C) (Tympanic)  Resp 16  Wt (!) 309 lb (140.2 kg)  SpO2 98%  BMI 44.34 kg/m2   GENERAL APPEARANCE: healthy, alert and no distress  EYES: Eyes grossly normal to inspection and conjunctivae and sclerae normal  RESP: respirations nonlabored   ORTHO: Lumber/Thoracic Spine Exam: Tender:  lumbar spinous processes, left para lumbar muscles  Non-tender:  right para lumbar muscles  Range of Motion:  left lateral thoracic bending   decreased, right lateral thoracic bending  decreased, left thoracic rotation  full, right thoracic rotation  full, lumbar flexion  decreased, lumbar extension  decreased  Strength:  able to heel walk, able to toe walk  NEURO:  Lower extremities with +5/5 strength  PSYCH: mentation appears normal and affect normal/bright     RIBS AND CHEST LEFT THREE VIEW   8/27/2017 8:56 PM      HISTORY: Fall landing on left side. Rib pain.     COMPARISON: 12/13/2013         IMPRESSION: Negative  exam.    LUMBAR SPINE TWO - THREE VIEWS   8/27/2017 8:56 PM      HISTORY: Fall, pain.     COMPARISON: None.         IMPRESSION: Mild to moderate multilevel bridging syndesmophytes are  present along with some degenerative facet disease. There is no  evidence for fracture. Posterior alignment is normal.     ASSESSMENT/PLAN:                                                    (W19.XXXD) Fall, subsequent encounter  (primary encounter diagnosis)  (M54.5) Acute left-sided low back pain without sciatica  Plan: cyclobenzaprine (FLEXERIL) 10 MG tablet, [facility]         PT, HAND, AND CHIROPRACTIC REFERRAL        Suspect soft tissue inflammation from trauma, should improve over next several weeks.  Recommend pain mgmt with scheduled ibuprofen and flexeril as needed.  Discussed drowsiness.  Recommend physical therapy, heat/ice application, gentle exercise like walking.  Follow up if not improving over 2-4 weeks      See Patient Instructions    Olivia Nunn CNP  [facility]    Patient Instructions   1.  For back pain continue advil 600mg three times a day and tylenol 1,000mg three times a day.  Heat or ice application, gentle activity like walking, see physical therapy.  If not seeing slow improvement over the next month follow up for reevaluation.

## 2017-08-31 ENCOUNTER — OFFICE VISIT (OUTPATIENT)
Dept: FAMILY MEDICINE | Facility: CLINIC | Age: 52
End: 2017-08-31
Payer: COMMERCIAL

## 2017-08-31 VITALS
OXYGEN SATURATION: 98 % | HEART RATE: 75 BPM | DIASTOLIC BLOOD PRESSURE: 74 MMHG | BODY MASS INDEX: 44.34 KG/M2 | SYSTOLIC BLOOD PRESSURE: 122 MMHG | WEIGHT: 309 LBS | TEMPERATURE: 97.4 F | RESPIRATION RATE: 16 BRPM
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
## 2017-08-31 DIAGNOSIS — M54.50 ACUTE LEFT-SIDED LOW BACK PAIN WITHOUT SCIATICA: ICD-10-CM

## 2017-08-31 PROCEDURE — 99213 OFFICE O/P EST LOW 20 MIN: CPT | Performed by: NURSE PRACTITIONER

## 2017-08-31 RX ORDER — CYCLOBENZAPRINE HCL 10 MG
5-10 TABLET ORAL 3 TIMES DAILY PRN
Qty: 30 TABLET | Refills: 1 | Status: SHIPPED | OUTPATIENT
Start: 2017-08-31 | End: 2018-10-15

## 2017-08-31 NOTE — PATIENT INSTRUCTIONS
1.  For back pain continue advil 600mg three times a day and tylenol 1,000mg three times a day.  Heat or ice application, gentle activity like walking, see physical therapy.  If not seeing slow improvement over the next month follow up for reevaluation.

## 2017-08-31 NOTE — MR AVS SNAPSHOT
After Visit Summary   8/31/2017    Aubrey Zavala    MRN: [id number]           Patient Information     Date Of Birth          1965        Visit Information        Provider Department      8/31/2017 6:20 PM Olivia Nunn APRN CNP [facility]        Today's Diagnoses     Fall, subsequent encounter    -  1    Acute left-sided low back pain without sciatica          Care Instructions    1.  For back pain continue advil 600mg three times a day and tylenol 1,000mg three times a day.  Heat or ice application, gentle activity like walking, see physical therapy.  If not seeing slow improvement over the next month follow up for reevaluation.            Follow-ups after your visit        Additional Services     [facility] PT, HAND, AND CHIROPRACTIC REFERRAL       **This order will print in the [facility] Scheduling Office**    Physical Therapy, Hand Therapy and Chiropractic Care are available through:    *[facility]  *[facility]  *[facility]    Call one number to schedule at any of the above locations: [phone].    Your provider has referred you to: Physical Therapy at [facility] or [facility]    Indication/Reason for Referral: Low Back Pain  Onset of Illness:   Therapy Orders: Evaluate and Treat  Special Programs: None  Special Request: None    Kenya Zambrano      Additional Comments for the Therapist or Chiropractor:     Please be aware that coverage of these services is subject to the terms and limitations of your health insurance plan.  Call member services at your health plan with any benefit or coverage questions.      Please bring the following to your appointment:    *Your personal calendar for scheduling future appointments  *Comfortable clothing                  Who to contact     If you have questions or need follow up information about today's clinic visit or your schedule please contact [facility] directly at  [phone].  Normal or non-critical lab and imaging results will be communicated to you by [employer], letter or phone within 4 business days after the clinic has received the results. If you do not hear from us within 7 days, please contact the clinic through [employer] or phone. If you have a critical or abnormal lab result, we will notify you by phone as soon as possible.  Submit refill requests through [employer] or call your pharmacy and they will forward the refill request to us. Please allow 3 business days for your refill to be completed.          Additional Information About Your Visit        [employer] Information     [employer] gives you secure access to your electronic health record. If you see a primary care provider, you can also send messages to your care team and make appointments. If you have questions, please call your primary care clinic.  If you do not have a primary care provider, please call [phone] and they will assist you.        Care EveryWhere ID     This is your Care EveryWhere ID. This could be used by other organizations to access your [place] medical records  [id number]        Your Vitals Were     Pulse Temperature Respirations Pulse Oximetry BMI (Body Mass Index)       75 97.4  F (36.3  C) (Tympanic) 16 98% 44.34 kg/m2        Blood Pressure from Last 3 Encounters:   08/31/17 122/74   08/27/17 125/78   07/10/17 132/76    Weight from Last 3 Encounters:   08/31/17 (!) 309 lb (140.2 kg)   08/27/17 (!) 303 lb (137.4 kg)   07/10/17 (!) 302 lb (137 kg)              We Performed the Following     [facility] PT, HAND, AND CHIROPRACTIC REFERRAL          Today's Medication Changes          These changes are accurate as of: 8/31/17  6:35 PM.  If you have any questions, ask your nurse or doctor.               Start taking these medicines.        Dose/Directions    cyclobenzaprine 10 MG tablet   Commonly known as:  FLEXERIL   Used for:  Fall, subsequent encounter, Acute left-sided low back pain without sciatica    Started by:  Olivia Nunn, APRN CNP        Dose:  5-10 mg   Take 0.5-1 tablets (5-10 mg) by mouth 3 times daily as needed for muscle spasms   Quantity:  30 tablet   Refills:  1            Where to get your medicines      These medications were sent to [place] [id number] - [facility] [address]  [address], [place] [address]     Phone:  [phone]     cyclobenzaprine 10 MG tablet                Primary Care Provider Office Phone # Fax #    MAGED Diaz [phone]       [address]  [facility] [address]        Equal Access to Services     BRITTANY PEREZ : Hadii aad ku hadasho Sosundeepali, waaxda luqadaha, qaybta kaalmada adeegyada, jerry diaz . So [facility] [phone].    ATENCIÓN: Si habla español, tiene a cullen disposición servicios gratuitos de asistencia lingüística. Llame al [phone].    We comply with applicable federal civil rights laws and [place] laws. We do not discriminate on the basis of race, color, national origin, age, disability sex, sexual orientation or gender identity.            Thank you!     Thank you for choosing [facility]  for your care. Our goal is always to provide you with excellent care. Hearing back from our patients is one way we can continue to improve our services. Please take a few minutes to complete the written survey that you may receive in the mail after your visit with us. Thank you!             Your Updated Medication List - Protect others around you: Learn how to safely use, store and throw away your medicines at www.disposemymeds.org.          This list is accurate as of: 8/31/17  6:35 PM.  Always use your most recent med list.                   Brand Name Dispense Instructions for use Diagnosis    ALAVERT 10 MG tablet   Generic drug:  loratadine      Take 10 mg by mouth daily    Hypertension, benign essential, goal below 140/90       cyclobenzaprine 10 MG  tablet    FLEXERIL    30 tablet    Take 0.5-1 tablets (5-10 mg) by mouth 3 times daily as needed for muscle spasms    Fall, subsequent encounter, Acute left-sided low back pain without sciatica       HYDROcodone-acetaminophen 5-325 MG per tablet    NORCO    6 tablet    Take 1 tablet by mouth every 8 hours as needed for moderate to severe pain        * lisinopril 10 MG tablet    PRINIVIL/ZESTRIL    30 tablet    Take 2 tablets (20 mg) by mouth daily        * lisinopril 20 MG tablet    PRINIVIL/ZESTRIL    90 tablet    Take 1 tablet (20 mg) by mouth daily    Hypertension, benign essential, goal below 140/90       triamcinolone 0.1 % ointment    KENALOG    80 g    Apply sparingly to affected area three times daily for 14 days.    Eczema, unspecified type       * Notice:  This list has 2 medication(s) that are the same as other medications prescribed for you. Read the directions carefully, and ask your doctor or other care provider to review them with you.

## 2017-12-11 ENCOUNTER — MYC REFILL (OUTPATIENT)
Dept: FAMILY MEDICINE | Facility: CLINIC | Age: 52
End: 2017-12-11

## 2017-12-11 DIAGNOSIS — L30.9 ECZEMA, UNSPECIFIED TYPE: ICD-10-CM

## 2017-12-11 NOTE — TELEPHONE ENCOUNTER
Message from [employer]:  Original authorizing provider: MAGED Diaz CNPgeri Sparkspeter would like a refill of the following medications:  triamcinolone (KENALOG) 0.1 % ointment [MAGED Diaz CNP]    Preferred pharmacy: [facility] [id number] [address]    Comment:  Please renew a prescription for Triamcinolone.

## 2017-12-14 RX ORDER — TRIAMCINOLONE ACETONIDE 1 MG/G
OINTMENT TOPICAL
Qty: 80 G | Refills: 1 | Status: SHIPPED | OUTPATIENT
Start: 2017-12-14 | End: 2018-10-15

## 2017-12-14 NOTE — TELEPHONE ENCOUNTER
Was this to be a short term medication? Directions say to use for 14 days.  Routing to provider.  Milagro Steele RN

## 2018-08-12 DIAGNOSIS — I10 HYPERTENSION, BENIGN ESSENTIAL, GOAL BELOW 140/90: ICD-10-CM

## 2018-08-13 NOTE — TELEPHONE ENCOUNTER
"Requested Prescriptions   Pending Prescriptions Disp Refills     lisinopril (PRINIVIL/ZESTRIL) 20 MG tablet [Pharmacy Med Name: LISINOPRIL 20 MG TABLET]  Last Written Prescription Date:  7/10/2017  Last Fill Quantity: 90 tablet,  # refills: 3   Last Office Visit: 8/31/2017   Future Office Visit:      90 tablet 3     Sig: TAKE 1 TABLET (20 MG) BY MOUTH DAILY    ACE Inhibitors (Including Combos) Protocol Failed    8/12/2018  1:29 AM       Failed - Normal serum creatinine on file in past 12 months    Recent Labs   Lab Test  07/10/17   1316   CR  0.90          Failed - Normal serum potassium on file in past 12 months    Recent Labs   Lab Test  07/10/17   1316   POTASSIUM  4.3          Passed - Blood pressure under 140/90 in past 12 months    BP Readings from Last 3 Encounters:   08/31/17 122/74   08/27/17 125/78   07/10/17 132/76          Passed - Recent (12 mo) or future (30 days) visit within the authorizing provider's specialty    Patient had office visit in the last 12 months or has a visit in the next 30 days with authorizing provider or within the authorizing provider's specialty.  See \"Patient Info\" tab in inbasket, or \"Choose Columns\" in Meds & Orders section of the refill encounter.           Passed - Patient is age 18 or older          "

## 2018-08-15 RX ORDER — LISINOPRIL 20 MG/1
TABLET ORAL
Qty: 90 TABLET | Refills: 3 | OUTPATIENT
Start: 2018-08-15

## 2018-08-17 DIAGNOSIS — I10 HYPERTENSION, BENIGN ESSENTIAL, GOAL BELOW 140/90: ICD-10-CM

## 2018-08-20 NOTE — TELEPHONE ENCOUNTER
"Requested Prescriptions   Pending Prescriptions Disp Refills     lisinopril (PRINIVIL/ZESTRIL) 20 MG tablet  Last Written Prescription Date:  7/10/2017  Last Fill Quantity: 90 tablet,  # refills: 3   Last Office Visit: 8/31/2017   Future Office Visit:      90 tablet 3     Sig: Take 1 tablet (20 mg) by mouth daily    ACE Inhibitors (Including Combos) Protocol Failed    8/17/2018  4:29 PM       Failed - Normal serum creatinine on file in past 12 months    Recent Labs   Lab Test  07/10/17   1316   CR  0.90          Failed - Normal serum potassium on file in past 12 months    Recent Labs   Lab Test  07/10/17   1316   POTASSIUM  4.3          Passed - Blood pressure under 140/90 in past 12 months    BP Readings from Last 3 Encounters:   08/31/17 122/74   08/27/17 125/78   07/10/17 132/76          Passed - Recent (12 mo) or future (30 days) visit within the authorizing provider's specialty    Patient had office visit in the last 12 months or has a visit in the next 30 days with authorizing provider or within the authorizing provider's specialty.  See \"Patient Info\" tab in inbasket, or \"Choose Columns\" in Meds & Orders section of the refill encounter.           Passed - Patient is age 18 or older          "

## 2018-08-22 RX ORDER — LISINOPRIL 20 MG/1
20 TABLET ORAL DAILY
Qty: 30 TABLET | Refills: 0 | Status: SHIPPED | OUTPATIENT
Start: 2018-08-22 | End: 2018-10-15

## 2018-08-22 NOTE — TELEPHONE ENCOUNTER
30 day supply given.  Patient is due for OV.  Please notify patient and assist in scheduling.    PCP: Please forward this message to the appropriate team to assist in notifying patient.  Thank you,  Love Bautista RN - Triage  [facility]

## 2018-10-15 ENCOUNTER — OFFICE VISIT (OUTPATIENT)
Dept: FAMILY MEDICINE | Facility: CLINIC | Age: 53
End: 2018-10-15
Payer: COMMERCIAL

## 2018-10-15 VITALS
SYSTOLIC BLOOD PRESSURE: 140 MMHG | WEIGHT: 300 LBS | BODY MASS INDEX: 42 KG/M2 | TEMPERATURE: 97.5 F | OXYGEN SATURATION: 97 % | HEART RATE: 82 BPM | HEIGHT: 71 IN | DIASTOLIC BLOOD PRESSURE: 90 MMHG

## 2018-10-15 DIAGNOSIS — Z13.220 SCREENING FOR LIPID DISORDERS: ICD-10-CM

## 2018-10-15 DIAGNOSIS — I10 HYPERTENSION, BENIGN ESSENTIAL, GOAL BELOW 140/90: ICD-10-CM

## 2018-10-15 DIAGNOSIS — E66.01 MORBID OBESITY (H): ICD-10-CM

## 2018-10-15 DIAGNOSIS — Z12.11 SCREENING FOR COLON CANCER: ICD-10-CM

## 2018-10-15 PROCEDURE — 36415 COLL VENOUS BLD VENIPUNCTURE: CPT | Performed by: FAMILY MEDICINE

## 2018-10-15 PROCEDURE — 80061 LIPID PANEL: CPT | Performed by: FAMILY MEDICINE

## 2018-10-15 PROCEDURE — 82043 UR ALBUMIN QUANTITATIVE: CPT | Performed by: FAMILY MEDICINE

## 2018-10-15 PROCEDURE — 99214 OFFICE O/P EST MOD 30 MIN: CPT | Performed by: FAMILY MEDICINE

## 2018-10-15 PROCEDURE — 80048 BASIC METABOLIC PNL TOTAL CA: CPT | Performed by: FAMILY MEDICINE

## 2018-10-15 RX ORDER — LISINOPRIL 20 MG/1
20 TABLET ORAL DAILY
Qty: 90 TABLET | Refills: 1 | Status: SHIPPED | OUTPATIENT
Start: 2018-10-15 | End: 2019-06-25

## 2018-10-15 NOTE — MR AVS SNAPSHOT
After Visit Summary   10/15/2018    Aubrey Zavala    MRN: [id number]           Patient Information     Date Of Birth          1965        Visit Information        Provider Department      10/15/2018 5:40 PM Charu Renner MD [facility]        Today's Diagnoses     Hypertension, benign essential, goal below 140/90        Morbid obesity (H)        Screening for lipid disorders        Screening for colon cancer          Care Instructions    Colonoscopy -   [facility] [phone]  [facility]  [phone]                   Follow-ups after your visit        Additional Services     GASTROENTEROLOGY ADULT REF PROCEDURE ONLY       Last Lab Result: Creatinine (mg/dL)       Date                     Value                 07/10/2017               0.90             ----------  Body mass index is 42.44 kg/(m^2).     Needed:  No  Language:  English    Patient will be contacted to schedule procedure.     Please be aware that coverage of these services is subject to the terms and limitations of your health insurance plan.  Call member services at your health plan with any benefit or coverage questions.  Any procedures must be performed at a [facility] facility OR coordinated by your clinic's referral office.    Please bring the following with you to your appointment:    (1) Any X-Rays, CTs or MRIs which have been performed.  Contact the facility where they were done to arrange for  prior to your scheduled appointment.    (2) List of current medications   (3) This referral request   (4) Any documents/labs given to you for this referral                  Follow-up notes from your care team     Return in about 6 months (around 4/15/2019) for BP Recheck.      Who to contact     If you have questions or need follow up information about today's clinic visit or your schedule please contact [facility] [place]note 2[facility] directly at [phone].  Normal or non-critical  "lab and imaging results will be communicated to you by "Shah[employer]at[employer] Broker"hart, letter or phone within 4 business days after the clinic has received the results. If you do not hear from us within 7 days, please contact the clinic through AF83 or phone. If you have a critical or abnormal lab result, we will notify you by phone as soon as possible.  Submit refill requests through AF83 or call your pharmacy and they will forward the refill request to us. Please allow 3 business days for your refill to be completed.          Additional Information About Your Visit        "Shahab P[employer] Tabatabai, Broker"[employer] Information     AF83 gives you secure access to your electronic health record. If you see a primary care provider, you can also send messages to your care team and make appointments. If you have questions, please call your primary care clinic.  If you do not have a primary care provider, please call [phone] and they will assist you.        Care EveryWhere ID     This is your Care EveryWhere ID. This could be used by other organizations to access your [facility] medical records  [id number]        Your Vitals Were     Pulse Temperature Height Pulse Oximetry BMI (Body Mass Index)       76 97.5  F (36.4  C) (Oral) 5' 10.5\" (1.791 m) 97% 42.44 kg/m2        Blood Pressure from Last 3 Encounters:   10/15/18 142/85   08/31/17 122/74   08/27/17 125/78    Weight from Last 3 Encounters:   10/15/18 300 lb (136.1 kg)   08/31/17 (!) 309 lb (140.2 kg)   08/27/17 (!) 303 lb (137.4 kg)              We Performed the Following     Albumin Random Urine Quantitative with Creat Ratio     Basic metabolic panel     GASTROENTEROLOGY ADULT REF PROCEDURE ONLY     Lipid Profile (Chol, Trig, HDL, LDL calc)          Today's Medication Changes          These changes are accurate as of 10/15/18  6:12 PM.  If you have any questions, ask your nurse or doctor.               These medicines have changed or have updated prescriptions.        Dose/Directions    lisinopril 20 MG tablet "   Commonly known as:  PRINIVIL/ZESTRIL   This may have changed:  Another medication with the same name was removed. Continue taking this medication, and follow the directions you see here.   Used for:  Hypertension, benign essential, goal below 140/90   Changed by:  Charu Renner MD        Dose:  20 mg   Take 1 tablet (20 mg) by mouth daily   Quantity:  90 tablet   Refills:  1         Stop taking these medicines if you haven't already. Please contact your care team if you have questions.     cyclobenzaprine 10 MG tablet   Commonly known as:  FLEXERIL   Stopped by:  Charu Renner MD           HYDROcodone-acetaminophen 5-325 MG per tablet   Commonly known as:  NORCO   Stopped by:  Charu Renner MD           triamcinolone 0.1 % ointment   Commonly known as:  KENALOG   Stopped by:  Charu Renner MD                Where to get your medicines      These medications were sent to [place] [address] [place] [facility] [address] [place] [address]  [address] [facility] [address]     Phone:  [phone]     lisinopril 20 MG tablet                Primary Care Provider Office Phone # Fax #    Olivia Sherrell Nunn, APRN [facility] [phone] [address] [facility] [address]        Equal Access to Services     [facility]: Hadii sheeba ku hadasho Soomaali, waaxda luqadaha, qaybta kaalmada adeegyada, jerry galvez hayaan maurisio diaz . So [facility] [phone].    ATENCIÓN: Si habla español, tiene a cullen disposición servicios gratuitos de asistencia lingüística. Llame al [phone].    We comply with applicable federal civil rights laws and [place] laws. We do not discriminate on the basis of race, color, national origin, age, disability, sex, sexual orientation, or gender identity.            Thank you!     Thank you for choosing [facility]  for your care. Our goal is always to provide you with excellent care. Hearing back from our patients is one way we can  continue to improve our services. Please take a few minutes to complete the written survey that you may receive in the mail after your visit with us. Thank you!             Your Updated Medication List - Protect others around you: Learn how to safely use, store and throw away your medicines at www.disposemymeds.org.          This list is accurate as of 10/15/18  6:12 PM.  Always use your most recent med list.                   Brand Name Dispense Instructions for use Diagnosis    ALAVERT 10 MG tablet   Generic drug:  loratadine      Take 10 mg by mouth daily    Hypertension, benign essential, goal below 140/90       lisinopril 20 MG tablet    PRINIVIL/ZESTRIL    90 tablet    Take 1 tablet (20 mg) by mouth daily    Hypertension, benign essential, goal below 140/90

## 2018-10-15 NOTE — PROGRESS NOTES
"  SUBJECTIVE:   Aubrey Zavala is a 53 year old male who presents to clinic today for the following health issues:    Hypertension Follow-up      Outpatient blood pressures are not being checked.    Low Salt Diet: no added salt      Amount of exercise or physical activity: None    Problems taking medications regularly: No    Medication side effects: none    Diet: regular (no restrictions)      Problem list and histories reviewed & adjusted, as indicated.  Additional history: as documented    Labs reviewed in EPIC    Reviewed and updated as needed this visit by clinical staff  Tobacco  Allergies  Meds  Med Hx  Surg Hx  Fam Hx  Soc Hx      Reviewed and updated as needed this visit by Provider         ROS:  Constitutional, HEENT, cardiovascular, pulmonary, gi and gu systems are negative, except as otherwise noted.    OBJECTIVE:     /90 (BP Location: Left arm, Patient Position: Sitting, Cuff Size: Adult Large)  Pulse 82  Temp 97.5  F (36.4  C) (Oral)  Ht 5' 10.5\" (1.791 m)  Wt 300 lb (136.1 kg)  SpO2 97%  BMI 42.44 kg/m2  Body mass index is 42.44 kg/(m^2).   GENERAL: healthy, alert and no distress  EYES: Eyes grossly normal to inspection  HENT:  nose and mouth without ulcers or lesions  PSYCH: mentation appears normal, affect normal    Diagnostic Test Results:  none     ASSESSMENT/PLAN:     1. Hypertension, benign essential, goal below 140/90  - B/P slightly elevated. Advised low salt diet and increased activity.   - lisinopril (PRINIVIL/ZESTRIL) 20 MG tablet; Take 1 tablet (20 mg) by mouth daily  Dispense: 90 tablet; Refill: 1  - Basic metabolic panel  - Albumin Random Urine Quantitative with Creat Ratio  - MA blood pressure check in one month.     2. Morbid obesity (H)  - Discussed healthy diet and exercise.     3. Screening for lipid disorders  - Lipid Profile (Chol, Trig, HDL, LDL calc)  The 10-year ASCVD risk score (Medwaysusana CANCHOAL Jr, et al., 2013) is: 4.3%    Values used to calculate the score:      Age: " 53 years      Sex: Male      Is Non- : No      Diabetic: No      Tobacco smoker: No      Systolic Blood Pressure: 140 mmHg      Is BP treated: Yes      HDL Cholesterol: 54 mg/dL      Total Cholesterol: 168 mg/dL    4. Screening for colon cancer  - GASTROENTEROLOGY ADULT REF PROCEDURE ONLY    Charu Renner MD  [facility]

## 2018-10-16 LAB
ANION GAP SERPL CALCULATED.3IONS-SCNC: 8 MMOL/L (ref 3–14)
BUN SERPL-MCNC: 15 MG/DL (ref 7–30)
CALCIUM SERPL-MCNC: 9.2 MG/DL (ref 8.5–10.1)
CHLORIDE SERPL-SCNC: 103 MMOL/L (ref 94–109)
CHOLEST SERPL-MCNC: 168 MG/DL
CO2 SERPL-SCNC: 26 MMOL/L (ref 20–32)
CREAT SERPL-MCNC: 0.97 MG/DL (ref 0.66–1.25)
CREAT UR-MCNC: 184 MG/DL
GFR SERPL CREATININE-BSD FRML MDRD: 81 ML/MIN/1.7M2
GLUCOSE SERPL-MCNC: 88 MG/DL (ref 70–99)
HDLC SERPL-MCNC: 54 MG/DL
LDLC SERPL CALC-MCNC: 78 MG/DL
MICROALBUMIN UR-MCNC: 14 MG/L
MICROALBUMIN/CREAT UR: 7.88 MG/G CR (ref 0–17)
NONHDLC SERPL-MCNC: 114 MG/DL
POTASSIUM SERPL-SCNC: 4.1 MMOL/L (ref 3.4–5.3)
SODIUM SERPL-SCNC: 137 MMOL/L (ref 133–144)
TRIGL SERPL-MCNC: 180 MG/DL

## 2019-02-22 ENCOUNTER — TELEPHONE (OUTPATIENT)
Dept: FAMILY MEDICINE | Facility: CLINIC | Age: 54
End: 2019-02-22

## 2019-02-22 NOTE — TELEPHONE ENCOUNTER
Called patient to schedule  MA visit for blood pressure check.Patient states e will contact his doctor, before making appointment.     Radha Alamo on 2/22/2019 at 10:02 AM

## 2019-03-12 NOTE — PROGRESS NOTES
SUBJECTIVE:   Aubrey Zavala is a 54 year old male who presents to clinic today for the following health issues:    Hypertension Follow-up      Outpatient blood pressures checked last week.  Results are not very good.    Low Salt Diet: no added salt        Hx of HTN for which he is on lisinopril 20mg daily.  Mildly elevated at last visit 10/2018, advised on low salt diet.  Here today for follow up.  He does not check blood pressures at home.  Denies chest pain, shortness of breath, or palpations.  Normal eye exam last month    Patient Active Problem List   Diagnosis     CARDIOVASCULAR SCREENING; LDL GOAL LESS THAN 130     Hypertension, benign essential, goal below 140/90     Morbid obesity (H)     Past Surgical History:   Procedure Laterality Date     CLOSED RX METATARSAL FX  1978     HC TOOTH EXTRACTION W/FORCEP         Social History     Tobacco Use     Smoking status: Never Smoker     Smokeless tobacco: Never Used   Substance Use Topics     Alcohol use: Yes     Comment: occ     Family History   Problem Relation Age of Onset     Hypertension Mother      Hypertension Brother          Current Outpatient Medications   Medication Sig Dispense Refill     lisinopril (PRINIVIL/ZESTRIL) 20 MG tablet Take 1 tablet (20 mg) by mouth daily 90 tablet 1     order for DME Equipment being ordered: sit/stand desk at work 1 Units 0     loratadine (ALAVERT) 10 MG tablet Take 10 mg by mouth daily         ROS:  CV, Resp as above, otherwise negative       OBJECTIVE:                                                    /84   Pulse 78   Temp 98.2  F (36.8  C) (Oral)   Resp 12   Wt 136.1 kg (300 lb)   SpO2 97%   BMI 42.44 kg/m     GENERAL APPEARANCE: healthy, alert and no distress  EYES: Eyes grossly normal to inspection and conjunctivae and sclerae normal  CV: regular rates and rhythm, normal S1 S2, no S3 or S4 and no murmur, click or rub  RESP: lungs clear to auscultation - no rales, rhonchi or wheezes   PSYCH: mentation  appears normal and affect normal/bright       Office Visit on 10/15/2018   Component Date Value Ref Range Status     Cholesterol 10/15/2018 168  <200 mg/dL Final     Triglycerides 10/15/2018 180* <150 mg/dL Final    Comment: Borderline high:  150-199 mg/dl  High:             200-499 mg/dl  Very high:       >499 mg/dl  Non Fasting       HDL Cholesterol 10/15/2018 54  >39 mg/dL Final     LDL Cholesterol Calculated 10/15/2018 78  <100 mg/dL Final    Desirable:       <100 mg/dl     Non HDL Cholesterol 10/15/2018 114  <130 mg/dL Final     Sodium 10/15/2018 137  133 - 144 mmol/L Final     Potassium 10/15/2018 4.1  3.4 - 5.3 mmol/L Final     Chloride 10/15/2018 103  94 - 109 mmol/L Final     Carbon Dioxide 10/15/2018 26  20 - 32 mmol/L Final     Anion Gap 10/15/2018 8  3 - 14 mmol/L Final     Glucose 10/15/2018 88  70 - 99 mg/dL Final     Urea Nitrogen 10/15/2018 15  7 - 30 mg/dL Final     Creatinine 10/15/2018 0.97  0.66 - 1.25 mg/dL Final     GFR Estimate 10/15/2018 81  >60 mL/min/1.7m2 Final    Non  GFR Calc     GFR Estimate If Black 10/15/2018 >90  >60 mL/min/1.7m2 Final    African American GFR Calc     Calcium 10/15/2018 9.2  8.5 - 10.1 mg/dL Final     Creatinine Urine 10/15/2018 184  mg/dL Final     Albumin Urine mg/L 10/15/2018 14  mg/L Final     Albumin Urine mg/g Cr 10/15/2018 7.88  0 - 17 mg/g Cr Final          ASSESSMENT/PLAN:                                                    (I10) Hypertension, benign essential, goal below 140/90  (primary encounter diagnosis)  Comment: first reading elevated, second manual reading by myself within acceptable range  Plan: varying readings, more data will be helpful.  He plans to check blood pressure at pharmacy and notify me of results in mychart.  We discussed plan to increase lisinopril to 40mg with 1 week nurse blood pressure and lab recheck if readings are elevated.  Discussed would not be surprised if we need to add second medication if follow up read is  not at goal.  He is in agreement    (Z12.11) Special screening for malignant neoplasms, colon  Comment:   Plan: Fecal colorectal cancer screen (FIT)        Interested in FIT instead of colo    (M54.5,  G89.29) Chronic low back pain, unspecified back pain laterality, with sciatica presence unspecified(M51.36)   DDD (degenerative disc disease), lumbar  Comment:   Plan: order for DME        Rx for sit/stand desk    (E66.01) Morbid obesity (H)  Comment:   Plan: discussed wt loss        See Patient Instructions    Olivia Nunn, [facility]    Patient Instructions   1.  Blood pressure looks good on second check.  Let do follow up check at pharmacy and notify me of result.  If above 140/90 would increase lisinopril and do follow up lab and blood pressure check.      2.  FIT test for colon cancer screening.    3.  Check with insurance on new singrex vaccine     4.  Rx for sit/stand desk

## 2019-03-13 ENCOUNTER — OFFICE VISIT (OUTPATIENT)
Dept: FAMILY MEDICINE | Facility: CLINIC | Age: 54
End: 2019-03-13
Payer: COMMERCIAL

## 2019-03-13 VITALS
BODY MASS INDEX: 42.44 KG/M2 | TEMPERATURE: 98.2 F | RESPIRATION RATE: 12 BRPM | SYSTOLIC BLOOD PRESSURE: 132 MMHG | DIASTOLIC BLOOD PRESSURE: 84 MMHG | HEART RATE: 78 BPM | OXYGEN SATURATION: 97 % | WEIGHT: 300 LBS

## 2019-03-13 DIAGNOSIS — M51.369 DDD (DEGENERATIVE DISC DISEASE), LUMBAR: ICD-10-CM

## 2019-03-13 DIAGNOSIS — M54.5 CHRONIC LOW BACK PAIN, UNSPECIFIED BACK PAIN LATERALITY, WITH SCIATICA PRESENCE UNSPECIFIED: ICD-10-CM

## 2019-03-13 DIAGNOSIS — Z12.11 SPECIAL SCREENING FOR MALIGNANT NEOPLASMS, COLON: ICD-10-CM

## 2019-03-13 DIAGNOSIS — I10 HYPERTENSION, BENIGN ESSENTIAL, GOAL BELOW 140/90: Primary | ICD-10-CM

## 2019-03-13 DIAGNOSIS — G89.29 CHRONIC LOW BACK PAIN, UNSPECIFIED BACK PAIN LATERALITY, WITH SCIATICA PRESENCE UNSPECIFIED: ICD-10-CM

## 2019-03-13 DIAGNOSIS — E66.01 MORBID OBESITY (H): ICD-10-CM

## 2019-03-13 PROCEDURE — 99213 OFFICE O/P EST LOW 20 MIN: CPT | Performed by: NURSE PRACTITIONER

## 2019-03-13 NOTE — PATIENT INSTRUCTIONS
1.  Blood pressure looks good on second check.  Let do follow up check at pharmacy and notify me of result.  If above 140/90 would increase lisinopril and do follow up lab and blood pressure check.      2.  FIT test for colon cancer screening.    3.  Check with insurance on new singrex vaccine     4.  Rx for sit/stand desk

## 2019-04-07 PROCEDURE — 82274 ASSAY TEST FOR BLOOD FECAL: CPT | Performed by: NURSE PRACTITIONER

## 2019-04-09 DIAGNOSIS — Z12.11 SPECIAL SCREENING FOR MALIGNANT NEOPLASMS, COLON: ICD-10-CM

## 2019-04-09 LAB — HEMOCCULT STL QL IA: NEGATIVE

## 2019-04-09 NOTE — RESULT ENCOUNTER NOTE
Aubrey.    Your FIT test, the screening test for colon cancer, was negative.  We should repeat this test once a year.  Thanks for getting it done!    Olivia Nunn, CNP

## 2019-06-04 ENCOUNTER — TELEPHONE (OUTPATIENT)
Dept: FAMILY MEDICINE | Facility: CLINIC | Age: 54
End: 2019-06-04

## 2019-06-04 DIAGNOSIS — I10 HYPERTENSION, BENIGN ESSENTIAL, GOAL BELOW 140/90: Primary | ICD-10-CM

## 2019-06-04 RX ORDER — LISINOPRIL 40 MG/1
40 TABLET ORAL DAILY
Qty: 30 TABLET | Refills: 0 | Status: SHIPPED | OUTPATIENT
Start: 2019-06-04 | End: 2019-07-02

## 2019-06-04 NOTE — TELEPHONE ENCOUNTER
Yes agree with below, please have pt increase lisinopril to 40mg and schedule 1 week MA blood pressure and lab check.    Olivia Nunn, CNP

## 2019-06-04 NOTE — TELEPHONE ENCOUNTER
Olivia-Please review and advise.    It is writer's understanding patient should increase Lisinopril to 40 mg daily and follow up for MA only BP check in 1 week.    Medication and pharmacy pended.    Thank you!  PEEWEE JoshiN, RN

## 2019-06-04 NOTE — TELEPHONE ENCOUNTER
Writer called patient and reviewed message per SHANTHI Nunn CNP, along with to which pharmacy Lisinopril 40 mg was sent.    Patient verbalized understanding and in agreement with plan.    Lab and MA appts scheduled on 6/10/19.  Appt date, times and location confirmed with patient.    Discussed with patient he may take two, Lisinopril 20 mg tablets together, for total of Lisinopril 40 mg daily.  This way patient can use up current supply of Lisinopril 20 mg tablets.    PEEWEE JoshiN, RN

## 2019-06-04 NOTE — TELEPHONE ENCOUNTER
"Reason for Call:  Other BP reading    Detailed comments: Patient checked his BP at [facility] 2 weeks ago & it was 142/94. Per LOV notes \"If above 140/90 would increase lisinopril and do follow up lab and blood pressure check\". Patient states he only has 5 tablets left of rx. Please advise, thank you!    Phone Number Patient can be reached at: Cell number on file:    Telephone Information:   Mobile [phone]       Best Time: anytime    Can we leave a detailed message on this number? YES    Call taken on 6/4/2019 at 7:45 AM by Nerissa Billings            "

## 2019-06-24 ENCOUNTER — ALLIED HEALTH/NURSE VISIT (OUTPATIENT)
Dept: NURSING | Facility: CLINIC | Age: 54
End: 2019-06-24
Payer: COMMERCIAL

## 2019-06-24 VITALS — SYSTOLIC BLOOD PRESSURE: 131 MMHG | HEART RATE: 75 BPM | DIASTOLIC BLOOD PRESSURE: 85 MMHG

## 2019-06-24 DIAGNOSIS — Z01.30 BP CHECK: Primary | ICD-10-CM

## 2019-06-24 PROCEDURE — 99207 ZZC NO CHARGE NURSE ONLY: CPT

## 2019-06-24 NOTE — NURSING NOTE
Aubrey Zavala is a 54 year old patient who comes in today for a Blood Pressure check.  Initial BP:  /85 (BP Location: Right arm, Patient Position: Chair, Cuff Size: Adult Large)   Pulse 75      75  Disposition: follow-up as previously indicated by provider.  Trina Alexandre MA

## 2019-06-24 NOTE — Clinical Note
Aubrey Zavala is a 54 year old patient who comes in today for a Blood Pressure check.Initial BP:  /85 (BP Location: Right arm, Patient Position: Chair, Cuff Size: Adult Large)   Pulse 75    75Disposition: follow-up as previously indicated by provider.Trina Alexandre MA

## 2019-06-25 DIAGNOSIS — I10 HYPERTENSION, BENIGN ESSENTIAL, GOAL BELOW 140/90: ICD-10-CM

## 2019-07-02 DIAGNOSIS — I10 HYPERTENSION, BENIGN ESSENTIAL, GOAL BELOW 140/90: ICD-10-CM

## 2019-07-02 RX ORDER — LISINOPRIL 40 MG/1
40 TABLET ORAL DAILY
Qty: 30 TABLET | Refills: 0 | Status: SHIPPED | OUTPATIENT
Start: 2019-07-02 | End: 2019-07-29

## 2019-07-02 NOTE — TELEPHONE ENCOUNTER
HW Reception: Please call patient, he is due for lab only appointment to check labs after recent dose adjustment. Future orders in chart    Medication is being filled for 1 time refill only due to:  Future labs ordered BMP.     Thank You!  Tammie Hernandez, MELI  Triage Nurse

## 2019-07-02 NOTE — TELEPHONE ENCOUNTER
"Requested Prescriptions   Pending Prescriptions Disp Refills     lisinopril (PRINIVIL/ZESTRIL) 40 MG tablet [Pharmacy Med Name: LISINOPRIL 40 MG TABLET] 30 tablet 0     Sig: TAKE 1 TABLET BY MOUTH EVERY DAY  Last Written Prescription Date:  6/4/2019  Last Fill Quantity: 30 tablet,  # refills: 0   Last Office Visit: 3/13/2019   Future Office Visit:            ACE Inhibitors (Including Combos) Protocol Passed - 7/2/2019  1:16 AM        Passed - Blood pressure under 140/90 in past 12 months     BP Readings from Last 3 Encounters:   06/24/19 131/85   03/13/19 132/84   10/15/18 140/90           Passed - Recent (12 mo) or future (30 days) visit within the authorizing provider's specialty     Patient had office visit in the last 12 months or has a visit in the next 30 days with authorizing provider or within the authorizing provider's specialty.  See \"Patient Info\" tab in inbasket, or \"Choose Columns\" in Meds & Orders section of the refill encounter.            Passed - Medication is active on med list        Passed - Patient is age 18 or older        Passed - Normal serum creatinine on file in past 12 months     Recent Labs   Lab Test 10/15/18  1819   CR 0.97           Passed - Normal serum potassium on file in past 12 months     Recent Labs   Lab Test 10/15/18  1819   POTASSIUM 4.1               "

## 2019-07-10 DIAGNOSIS — I10 HYPERTENSION, BENIGN ESSENTIAL, GOAL BELOW 140/90: ICD-10-CM

## 2019-07-10 PROCEDURE — 80048 BASIC METABOLIC PNL TOTAL CA: CPT | Performed by: NURSE PRACTITIONER

## 2019-07-10 PROCEDURE — 36415 COLL VENOUS BLD VENIPUNCTURE: CPT | Performed by: NURSE PRACTITIONER

## 2019-07-11 LAB
ANION GAP SERPL CALCULATED.3IONS-SCNC: 9 MMOL/L (ref 3–14)
BUN SERPL-MCNC: 16 MG/DL (ref 7–30)
CALCIUM SERPL-MCNC: 8.6 MG/DL (ref 8.5–10.1)
CHLORIDE SERPL-SCNC: 107 MMOL/L (ref 94–109)
CO2 SERPL-SCNC: 22 MMOL/L (ref 20–32)
CREAT SERPL-MCNC: 0.88 MG/DL (ref 0.66–1.25)
GFR SERPL CREATININE-BSD FRML MDRD: >90 ML/MIN/{1.73_M2}
GLUCOSE SERPL-MCNC: 129 MG/DL (ref 70–99)
POTASSIUM SERPL-SCNC: 4.2 MMOL/L (ref 3.4–5.3)
SODIUM SERPL-SCNC: 138 MMOL/L (ref 133–144)

## 2019-07-11 NOTE — RESULT ENCOUNTER NOTE
Lou Mr. Zavala,  Your results came back and are within acceptable limits. -Kidney function (GFR) is normal.  -Sodium is normal.  -Potassium is normal.  -Calcium is normal.  -Glucose is mildly elevated but you were nonfasting and this is okay recheck in 3 months fasting with PCP breonna and check for DM at that time to,. If you have any further concerns please do not hesitate to contact us by message, phone or making an appointment.  Have a good day   Dr Arnav PHIPPS

## 2019-07-29 DIAGNOSIS — I10 HYPERTENSION, BENIGN ESSENTIAL, GOAL BELOW 140/90: ICD-10-CM

## 2019-07-29 RX ORDER — LISINOPRIL 40 MG/1
40 TABLET ORAL DAILY
Qty: 90 TABLET | Refills: 2 | Status: SHIPPED | OUTPATIENT
Start: 2019-07-29 | End: 2020-03-11

## 2019-07-29 NOTE — TELEPHONE ENCOUNTER
"Requested Prescriptions   Pending Prescriptions Disp Refills     lisinopril (PRINIVIL/ZESTRIL) 40 MG tablet [Pharmacy Med Name: LISINOPRIL 40 MG TABLET] 30 tablet 0     Sig: TAKE 1 TABLET (40 MG) BY MOUTH DAILY **NEEDS LABS BEFORE FURTHER REFILLS**       ACE Inhibitors (Including Combos) Protocol Passed - 7/29/2019  1:23 AM        Passed - Blood pressure under 140/90 in past 12 months     BP Readings from Last 3 Encounters:   06/24/19 131/85   03/13/19 132/84   10/15/18 140/90                 Passed - Recent (12 mo) or future (30 days) visit within the authorizing provider's specialty     Patient had office visit in the last 12 months or has a visit in the next 30 days with authorizing provider or within the authorizing provider's specialty.  See \"Patient Info\" tab in inbasket, or \"Choose Columns\" in Meds & Orders section of the refill encounter.              Passed - Medication is active on med list        Passed - Patient is age 18 or older        Passed - Normal serum creatinine on file in past 12 months     Recent Labs   Lab Test 07/10/19  1638   CR 0.88             Passed - Normal serum potassium on file in past 12 months     Recent Labs   Lab Test 07/10/19  1638   POTASSIUM 4.2             Signed Prescriptions:                        Disp   Refills    lisinopril (PRINIVIL/ZESTRIL) 40 MG tablet 90 tab*2        Sig: Take 1 tablet (40 mg) by mouth daily  Authorizing Provider: FLAKITA DO  Ordering User: TODD KERR      "

## 2019-10-03 ENCOUNTER — HEALTH MAINTENANCE LETTER (OUTPATIENT)
Age: 54
End: 2019-10-03

## 2020-03-10 NOTE — PROGRESS NOTES
Subjective     Aubrey Zavala is a 55 year old male who presents to clinic today for the following health issues:    HPI   Hypertension Follow-up    Hypertension Follow-up       Outpatient blood pressures checked last week.  Results are not very good.    Low Salt Diet: no added salt      Last seen by myself for htn 1 year ago.  Blood pressure was elevated and advised increase in lisinopril dose to 40mg.  Did not return until  for follow up but normal BMP and blood pressure at that time.  No chest pain, some occasional shortness of breath with exertion.  No presyncope.  No palpitations or racing heart.      160/90 at work last week, had taken medicine 10min before checking.  Dad  of MI 53.  Cancer at 63 brain astrocytoma.  Brother has cancer and some other issues.  Has had heart attack in his 50s.  Dad smoked a pipe, brother does not smoke.      MENs disease runs in the family, sister and brother have it.  Brother has needed surgery and chemo.      Steroid cream worked for psoriasis on left leg.      Toes messed up with fungal infection.    Neg FIT 2019.      Zoster/flu      Patient Active Problem List   Diagnosis     CARDIOVASCULAR SCREENING; LDL GOAL LESS THAN 130     Hypertension, benign essential, goal below 140/90     Morbid obesity (H)     Past Surgical History:   Procedure Laterality Date     CLOSED RX METATARSAL FX       HC TOOTH EXTRACTION W/FORCEP         Social History     Tobacco Use     Smoking status: Never Smoker     Smokeless tobacco: Never Used   Substance Use Topics     Alcohol use: Yes     Comment: occ     Family History   Problem Relation Age of Onset     Hypertension Mother      Coronary Artery Disease Father      Cancer Sister         MENs disease     Cancer Brother         MENs disease     Hypertension Brother          Current Outpatient Medications   Medication Sig Dispense Refill     lisinopril (ZESTRIL) 40 MG tablet Take 1 tablet (40 mg) by mouth daily 90 tablet 3     loratadine  (ALAVERT) 10 MG tablet Take 10 mg by mouth daily       order for DME Equipment being ordered: sit/stand desk at work 1 Units 0     terbinafine (LAMISIL) 250 MG tablet Take 1 tablet (250 mg) by mouth daily 90 tablet 0     triamcinolone (KENALOG) 0.1 % external ointment Apply sparingly to affected area three times daily for 14 days. 30 g 1         Reviewed and updated as needed this visit by Provider         Review of Systems   ROS COMP: Constitutional, HEENT, cardiovascular, pulmonary, gi and gu systems are negative, except as otherwise noted.      Objective    /82 (BP Location: Right arm, Patient Position: Chair, Cuff Size: Adult Large)   Pulse 97   Temp 98.6  F (37  C) (Oral)   Resp 16   Wt 134.7 kg (297 lb)   SpO2 98%   BMI 42.01 kg/m    Body mass index is 42.01 kg/m .  Physical Exam   GENERAL APPEARANCE: healthy, alert and no distress  EYES: Eyes grossly normal to inspection and conjunctivae and sclerae normal  RESP: lungs clear to auscultation - no rales, rhonchi or wheezes  CV: regular rates and rhythm, normal S1 S2, no S3 or S4 and no murmur, click or rub  PSYCH: mentation appears normal and affect normal/bright       Diagnostic Test Results:  Labs reviewed in Epic        Assessment & Plan     (I10) Hypertension, benign essential, goal below 140/90  (primary encounter diagnosis)  Comment: controlled, elevated reading at work but had only taken med 10min prior  Plan: Albumin Random Urine Quantitative with Creat         Ratio, Lipid panel reflex to direct LDL         Fasting, lisinopril (ZESTRIL) 40 MG tablet,         Exercise Stress Test - Adult, Comprehensive         metabolic panel, Comprehensive metabolic panel,        CANCELED: Basic metabolic panel        Cont lisinopril, update labs today    (Z82.49) Family history of early CAD  Comment: significant family hx premature disease and individual risk factors of sex and htn   Plan: Exercise Stress Test - Adult        Recommend start with stress  test.  If normal will consider follow up with preventative cardiology.  Pt could consider statin to reduce risk.      (Z83.41) Family history of MEN (multiple endocrine neoplasia)  Comment: and pt reports other cancers  Plan: CANCER RISK MGMT/CANCER GENETIC COUNSELING         REFERRAL        Recommend meeting with      (L03.039) Onychia of toe, unspecified laterality  Comment:   Plan: terbinafine (LAMISIL) 250 MG tablet        Interested in oral treatment, reviewed duration of treatment.  LFTs today and in 1mo.      (L30.9) Eczema, unspecified type  Comment:   Plan: triamcinolone (KENALOG) 0.1 % external ointment        refilled    (Z11.4) Encounter for screening for HIV  Comment:   Plan: HIV Antigen Antibody Combo            (Z51.81) Encounter for therapeutic drug monitoring  Comment:   Plan: Comprehensive metabolic panel, Comprehensive         metabolic panel                 See Patient Instructions    No follow-ups on file.    MAGED Diaz [facility]

## 2020-03-11 ENCOUNTER — OFFICE VISIT (OUTPATIENT)
Dept: FAMILY MEDICINE | Facility: CLINIC | Age: 55
End: 2020-03-11
Payer: COMMERCIAL

## 2020-03-11 VITALS
DIASTOLIC BLOOD PRESSURE: 82 MMHG | HEART RATE: 97 BPM | OXYGEN SATURATION: 98 % | TEMPERATURE: 98.6 F | RESPIRATION RATE: 16 BRPM | BODY MASS INDEX: 42.01 KG/M2 | SYSTOLIC BLOOD PRESSURE: 134 MMHG | WEIGHT: 297 LBS

## 2020-03-11 DIAGNOSIS — L30.9 ECZEMA, UNSPECIFIED TYPE: ICD-10-CM

## 2020-03-11 DIAGNOSIS — Z82.49 FAMILY HISTORY OF EARLY CAD: ICD-10-CM

## 2020-03-11 DIAGNOSIS — Z51.81 ENCOUNTER FOR THERAPEUTIC DRUG MONITORING: ICD-10-CM

## 2020-03-11 DIAGNOSIS — Z23 NEED FOR PROPHYLACTIC VACCINATION AND INOCULATION AGAINST INFLUENZA: ICD-10-CM

## 2020-03-11 DIAGNOSIS — Z83.41 FAMILY HISTORY OF MEN (MULTIPLE ENDOCRINE NEOPLASIA): ICD-10-CM

## 2020-03-11 DIAGNOSIS — Z11.4 ENCOUNTER FOR SCREENING FOR HIV: ICD-10-CM

## 2020-03-11 DIAGNOSIS — I10 HYPERTENSION, BENIGN ESSENTIAL, GOAL BELOW 140/90: Primary | ICD-10-CM

## 2020-03-11 DIAGNOSIS — L03.039 ONYCHIA OF TOE, UNSPECIFIED LATERALITY: ICD-10-CM

## 2020-03-11 PROCEDURE — 87389 HIV-1 AG W/HIV-1&-2 AB AG IA: CPT | Performed by: NURSE PRACTITIONER

## 2020-03-11 PROCEDURE — 99214 OFFICE O/P EST MOD 30 MIN: CPT | Mod: 25 | Performed by: NURSE PRACTITIONER

## 2020-03-11 PROCEDURE — 82043 UR ALBUMIN QUANTITATIVE: CPT | Performed by: NURSE PRACTITIONER

## 2020-03-11 PROCEDURE — 36415 COLL VENOUS BLD VENIPUNCTURE: CPT | Performed by: NURSE PRACTITIONER

## 2020-03-11 PROCEDURE — 90750 HZV VACC RECOMBINANT IM: CPT | Performed by: NURSE PRACTITIONER

## 2020-03-11 PROCEDURE — 80053 COMPREHEN METABOLIC PANEL: CPT | Performed by: NURSE PRACTITIONER

## 2020-03-11 PROCEDURE — 90471 IMMUNIZATION ADMIN: CPT | Performed by: NURSE PRACTITIONER

## 2020-03-11 PROCEDURE — 90472 IMMUNIZATION ADMIN EACH ADD: CPT | Performed by: NURSE PRACTITIONER

## 2020-03-11 PROCEDURE — 90682 RIV4 VACC RECOMBINANT DNA IM: CPT | Performed by: NURSE PRACTITIONER

## 2020-03-11 PROCEDURE — 80061 LIPID PANEL: CPT | Performed by: NURSE PRACTITIONER

## 2020-03-11 RX ORDER — LISINOPRIL 40 MG/1
40 TABLET ORAL DAILY
Qty: 90 TABLET | Refills: 3 | Status: SHIPPED | OUTPATIENT
Start: 2020-03-11 | End: 2021-03-12

## 2020-03-11 RX ORDER — TERBINAFINE HYDROCHLORIDE 250 MG/1
250 TABLET ORAL DAILY
Qty: 90 TABLET | Refills: 0 | Status: SHIPPED | OUTPATIENT
Start: 2020-03-11 | End: 2022-01-13

## 2020-03-11 RX ORDER — TRIAMCINOLONE ACETONIDE 1 MG/G
OINTMENT TOPICAL
Qty: 30 G | Refills: 1 | Status: SHIPPED | OUTPATIENT
Start: 2020-03-11 | End: 2021-03-12

## 2020-03-11 NOTE — PATIENT INSTRUCTIONS
1.  Continue lisinopril, update labs.  Check blood pressure periodically, at rest for 5min, let me know if its high (> 140/90)    2.  Schedule stress test given family history.  Call [phone] to schedule.  Call for stress test estimate [phone]    3.  Consider seeing cancer  given family history     4.  For toenail fungus start terbinafine once a day for 3 months.  schedule lab visit for liver test in 1 month    5.  cologuard for colon cancer screening

## 2020-03-12 LAB
ALBUMIN SERPL-MCNC: 4.2 G/DL (ref 3.4–5)
ALP SERPL-CCNC: 114 U/L (ref 40–150)
ALT SERPL W P-5'-P-CCNC: 46 U/L (ref 0–70)
ANION GAP SERPL CALCULATED.3IONS-SCNC: 5 MMOL/L (ref 3–14)
AST SERPL W P-5'-P-CCNC: 25 U/L (ref 0–45)
BILIRUB SERPL-MCNC: 0.2 MG/DL (ref 0.2–1.3)
BUN SERPL-MCNC: 11 MG/DL (ref 7–30)
CALCIUM SERPL-MCNC: 9.3 MG/DL (ref 8.5–10.1)
CHLORIDE SERPL-SCNC: 106 MMOL/L (ref 94–109)
CHOLEST SERPL-MCNC: 199 MG/DL
CO2 SERPL-SCNC: 27 MMOL/L (ref 20–32)
CREAT SERPL-MCNC: 0.87 MG/DL (ref 0.66–1.25)
CREAT UR-MCNC: 170 MG/DL
GFR SERPL CREATININE-BSD FRML MDRD: >90 ML/MIN/{1.73_M2}
GLUCOSE SERPL-MCNC: 91 MG/DL (ref 70–99)
HDLC SERPL-MCNC: 58 MG/DL
HIV 1+2 AB+HIV1 P24 AG SERPL QL IA: NONREACTIVE
LDLC SERPL CALC-MCNC: 79 MG/DL
MICROALBUMIN UR-MCNC: 21 MG/L
MICROALBUMIN/CREAT UR: 12.41 MG/G CR (ref 0–17)
NONHDLC SERPL-MCNC: 141 MG/DL
POTASSIUM SERPL-SCNC: 4.4 MMOL/L (ref 3.4–5.3)
PROT SERPL-MCNC: 8.3 G/DL (ref 6.8–8.8)
SODIUM SERPL-SCNC: 138 MMOL/L (ref 133–144)
TRIGL SERPL-MCNC: 312 MG/DL

## 2020-03-17 NOTE — RESULT ENCOUNTER NOTE
Aubrey,    Normal kidney and liver function.  Your blood sugar was normal indicating you do not have diabetes.    Cholesterol looks good.    HIV screen was negative.

## 2020-05-06 ENCOUNTER — TRANSFERRED RECORDS (OUTPATIENT)
Dept: HEALTH INFORMATION MANAGEMENT | Facility: CLINIC | Age: 55
End: 2020-05-06

## 2020-05-06 LAB — COLOGUARD-ABSTRACT: NEGATIVE

## 2020-06-22 ENCOUNTER — OFFICE VISIT (OUTPATIENT)
Dept: URGENT CARE | Facility: URGENT CARE | Age: 55
End: 2020-06-22
Payer: COMMERCIAL

## 2020-06-22 VITALS
TEMPERATURE: 97.6 F | OXYGEN SATURATION: 98 % | WEIGHT: 297 LBS | HEART RATE: 94 BPM | BODY MASS INDEX: 42.52 KG/M2 | DIASTOLIC BLOOD PRESSURE: 90 MMHG | HEIGHT: 70 IN | SYSTOLIC BLOOD PRESSURE: 154 MMHG

## 2020-06-22 DIAGNOSIS — S39.012A STRAIN OF LUMBAR REGION, INITIAL ENCOUNTER: Primary | ICD-10-CM

## 2020-06-22 PROCEDURE — 99213 OFFICE O/P EST LOW 20 MIN: CPT | Performed by: PHYSICIAN ASSISTANT

## 2020-06-22 RX ORDER — IBUPROFEN 600 MG/1
600 TABLET, FILM COATED ORAL EVERY 6 HOURS PRN
Qty: 60 TABLET | Refills: 0 | Status: SHIPPED | OUTPATIENT
Start: 2020-06-22 | End: 2020-07-07

## 2020-06-22 RX ORDER — CYCLOBENZAPRINE HCL 10 MG
10 TABLET ORAL 3 TIMES DAILY PRN
COMMUNITY
End: 2021-03-12

## 2020-06-22 RX ORDER — CYCLOBENZAPRINE HCL 10 MG
10 TABLET ORAL 3 TIMES DAILY PRN
Qty: 21 TABLET | Refills: 0 | Status: SHIPPED | OUTPATIENT
Start: 2020-06-22 | End: 2020-06-29

## 2020-06-22 ASSESSMENT — ENCOUNTER SYMPTOMS
CONSTITUTIONAL NEGATIVE: 1
CARDIOVASCULAR NEGATIVE: 1
RESPIRATORY NEGATIVE: 1
BACK PAIN: 1
PSYCHIATRIC NEGATIVE: 1
NEUROLOGICAL NEGATIVE: 1
GASTROINTESTINAL NEGATIVE: 1

## 2020-06-22 ASSESSMENT — MIFFLIN-ST. JEOR: SCORE: 2193.43

## 2020-06-22 NOTE — PROGRESS NOTES
SUBJECTIVE:   Aubrey Zavala is a 55 year old male presenting with a chief complaint of   Chief Complaint   Patient presents with     Urgent Care     Pt in clinic c/o severe low back pain.     Back Pain       He is an established patient of [facility].    Back Pain    Onset of symptoms was 2 day(s) ago.  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    Severity severe  Current and Associated symptoms: pain and stiffness  Denies: fecal incontinence, urinary incontinence, lower extremity numbness, lower extremity weakness and paresthesia    Aggravating Factors: lying, sitting, bending, changing position and driving  Therapies to improve symptoms include: ice, ibuprofen and rest  Past history: previous degenerative joint disease of the lumbar spine      Review of Systems   Constitutional: Negative.    HENT: Negative.    Respiratory: Negative.    Cardiovascular: Negative.    Gastrointestinal: Negative.    Genitourinary: Negative.    Musculoskeletal: Positive for back pain. Negative for gait problem.   Neurological: Negative.    Psychiatric/Behavioral: Negative.        Past Medical History:   Diagnosis Date     CARDIOVASCULAR SCREENING; LDL GOAL LESS THAN 130      Hypertension, benign essential, goal below 140/90 9/16/2016     Unspecified essential hypertension      Family History   Problem Relation Age of Onset     Hypertension Mother      Coronary Artery Disease Father      Cancer Sister         MENs disease     Cancer Brother         MENs disease     Hypertension Brother      Current Outpatient Medications   Medication Sig Dispense Refill     cyclobenzaprine (FLEXERIL) 10 MG tablet Take 10 mg by mouth 3 times daily as needed for muscle spasms       lisinopril  "(ZESTRIL) 40 MG tablet Take 1 tablet (40 mg) by mouth daily 90 tablet 3     loratadine (ALAVERT) 10 MG tablet Take 10 mg by mouth daily       terbinafine (LAMISIL) 250 MG tablet Take 1 tablet (250 mg) by mouth daily 90 tablet 0     order for DME Equipment being ordered: sit/stand desk at work (Patient not taking: Reported on 6/22/2020) 1 Units 0     triamcinolone (KENALOG) 0.1 % external ointment Apply sparingly to affected area three times daily for 14 days. (Patient not taking: Reported on 6/22/2020) 30 g 1     Social History     Tobacco Use     Smoking status: Never Smoker     Smokeless tobacco: Never Used   Substance Use Topics     Alcohol use: Yes     Comment: occ       OBJECTIVE  BP (!) 154/90   Pulse 94   Temp 97.6  F (36.4  C) (Oral)   Ht 1.778 m (5' 10\")   Wt 134.7 kg (297 lb)   SpO2 98%   BMI 42.62 kg/m      Physical Exam  Constitutional:       General: He is not in acute distress.     Appearance: Normal appearance. He is normal weight. He is not ill-appearing, toxic-appearing or diaphoretic.   HENT:      Head: Normocephalic and atraumatic.   Cardiovascular:      Rate and Rhythm: Normal rate and regular rhythm.      Pulses: Normal pulses.      Heart sounds: Normal heart sounds. No murmur. No friction rub. No gallop.    Pulmonary:      Effort: Pulmonary effort is normal. No respiratory distress.      Breath sounds: Normal breath sounds. No stridor. No wheezing, rhonchi or rales.   Chest:      Chest wall: No tenderness.   Abdominal:      General: Abdomen is flat. Bowel sounds are normal. There is no distension.      Palpations: Abdomen is soft. There is no mass.      Tenderness: There is no abdominal tenderness. There is no right CVA tenderness, left CVA tenderness, guarding or rebound.      Hernia: No hernia is present.   Musculoskeletal:         General: Tenderness present. No swelling, deformity or signs of injury.      Comments: Lumbar spine is moderately stiff. Patient's pain worsens with flexion " and extension of the spine. Left lumbar paraspinals are tender. Right side is non-tender. Straight leg raise is positive on the left side. Unable to due cash test as too painful.    Neurological:      General: No focal deficit present.      Mental Status: He is alert and oriented to person, place, and time. Mental status is at baseline.   Psychiatric:         Mood and Affect: Mood normal.         Behavior: Behavior normal.         Thought Content: Thought content normal.         Judgment: Judgment normal.         ASSESSMENT/PLAN:    (S39.012A) Strain of lumbar region, initial encounter  (primary encounter diagnosis)  Plan: cyclobenzaprine (FLEXERIL) 10 MG tablet,         ibuprofen (ADVIL/MOTRIN) 600 MG tablet    Patient was advised to return to clinic if symptoms do not improve in the amount of time specified in the AVS or if symptoms worsen. Patient educated on red flag symptoms and asked to go directly to the ED if symptoms present themselves.     Consider PT or repeat lumbar MRI if pain persists beyond 1 week.     Arya Vazquez PA-C on 6/22/2020 at 4:47 PM

## 2020-06-22 NOTE — PATIENT INSTRUCTIONS
Patient Education     Back Sprain or Strain    Injury to the muscles (strain) or ligaments (sprain) around the spine can be troubling. Injury may occur after a sudden forceful twisting or bending such as in a car accident, after a simple awkward movement, or after lifting something heavy with poor body positioning. In any case, muscle spasm is often present and adds to the pain.  Thankfully, most people feel better in 1 to 2 weeks. Most of the rest feel better in 1 to 2 months. Most people can remain active. Unless you had a forceful or traumatic physical injury such as a car accident or fall, X-rays may not be done for the first assessment of a back sprain or strain. If pain continues and doesn't respond to medical treatment, your healthcare provider may then do X-rays and other tests.  Home care  These guidelines will help you care for your injury at home:    When in bed, try to find a comfortable position. A firm mattress is best. Try lying flat on your back with pillows under your knees. You can also try lying on your side with your knees bent up toward your chest and a pillow between your knees.    Don't sit for long periods. Try not to take long car rides or take other trips that have you sitting for a long time. This puts more stress on the lower back than standing or walking.    During the first 24 to 72 hours after an injury or flare-up, put an ice pack on the painful area for 20 minutes. Then remove it for 20 minutes. Do this for 60 to 90 minutes, or several times a day. This will reduce swelling and pain. Always wrap the ice pack in a thin towel or plastic to protect your skin.    You can start with ice, then switch to heat. Heat from a hot shower, hot bath, or heating pad reduces pain and works well for muscle spasms. Put heat on the painful area for 20 minutes, then remove for 20 minutes. Do this for 60 to 90 minutes, or several times a day. Don't use a heating pad while sleeping. It can burn the  skin.    You can alternate the ice and heat. Talk with your healthcare provider to find out the best treatment or therapy for your back pain.    Therapeutic massage can help relax the back muscles without stretching them.    Be aware of safe lifting methods. Don't lift anything over 15 pounds until all of the pain is gone.  Medicines  Talk with your healthcare provider before using medicines, especially if you have other health problems or are taking other medicines.    You may use over-the-counter medicines such as acetaminophen, ibuprofen, or naproxen to control pain, unless another pain medicine was prescribed. Talk with your healthcare provider before taking any medicines if you have a chronic condition such as diabetes, liver or kidney disease, stomach ulcers, or digestive bleeding, or are taking blood-thinner medicines.    Be careful if you are given prescription medicines, opioids, or medicine for muscle spasm. They can cause drowsiness, and affect your coordination, reflexes, and judgment. Don't drive or operate heavy machinery when taking these types of medicines. Only take pain medicine as prescribed by your healthcare provider.  Follow-up care  Follow up with your healthcare provider, or as advised. You may need physical therapy or more tests if your symptoms get worse.  If you had X-rays, your healthcare provider may be checking for any broken bones, breaks, or fractures. Bruises and sprains can sometimes hurt as much as a fracture. These injuries can take time to heal fully. If your symptoms don t get better or they get worse, talk with your healthcare provider. You may need a repeat X-ray or other tests.  Call 911  Call 911 if any of the following occur:    Trouble breathing    Confused    Very drowsy or trouble awakening    Fainting or loss of consciousness    Rapid or very slow heart rate    Loss of bowel or bladder control  When to seek medical advice  Call your healthcare provider right away if any  of the following occur:    Pain gets worse or spreads to your arms or legs    Weakness or numbness in one or both arms or legs    Numbness in the groin or genital area  Date Last Reviewed: 6/1/2016 2000-2019 [employer]. [address] [place] [id number]. All rights reserved. This information is not intended as a substitute for professional medical care. Always follow your healthcare professional's instructions.

## 2021-01-15 ENCOUNTER — HEALTH MAINTENANCE LETTER (OUTPATIENT)
Age: 56
End: 2021-01-15

## 2021-03-12 ENCOUNTER — OFFICE VISIT (OUTPATIENT)
Dept: FAMILY MEDICINE | Facility: CLINIC | Age: 56
End: 2021-03-12
Payer: COMMERCIAL

## 2021-03-12 VITALS
BODY MASS INDEX: 40.18 KG/M2 | OXYGEN SATURATION: 97 % | TEMPERATURE: 97.1 F | SYSTOLIC BLOOD PRESSURE: 169 MMHG | DIASTOLIC BLOOD PRESSURE: 85 MMHG | HEIGHT: 71 IN | RESPIRATION RATE: 14 BRPM | HEART RATE: 66 BPM | WEIGHT: 287 LBS

## 2021-03-12 DIAGNOSIS — Z11.59 ENCOUNTER FOR HEPATITIS C SCREENING TEST FOR LOW RISK PATIENT: ICD-10-CM

## 2021-03-12 DIAGNOSIS — Z83.41 FAMILY HISTORY OF MEN (MULTIPLE ENDOCRINE NEOPLASIA): ICD-10-CM

## 2021-03-12 DIAGNOSIS — M54.50 ACUTE MIDLINE LOW BACK PAIN WITHOUT SCIATICA: ICD-10-CM

## 2021-03-12 DIAGNOSIS — Z82.49 FAMILY HISTORY OF ISCHEMIC HEART DISEASE: ICD-10-CM

## 2021-03-12 DIAGNOSIS — Z00.00 ROUTINE GENERAL MEDICAL EXAMINATION AT A HEALTH CARE FACILITY: Primary | ICD-10-CM

## 2021-03-12 DIAGNOSIS — I10 HYPERTENSION, BENIGN ESSENTIAL, GOAL BELOW 140/90: ICD-10-CM

## 2021-03-12 DIAGNOSIS — F43.22 ADJUSTMENT DISORDER WITH ANXIOUS MOOD: ICD-10-CM

## 2021-03-12 PROCEDURE — 99213 OFFICE O/P EST LOW 20 MIN: CPT | Mod: 25 | Performed by: NURSE PRACTITIONER

## 2021-03-12 PROCEDURE — 99396 PREV VISIT EST AGE 40-64: CPT | Performed by: NURSE PRACTITIONER

## 2021-03-12 RX ORDER — HYDROCHLOROTHIAZIDE 25 MG/1
25 TABLET ORAL DAILY
Qty: 30 TABLET | Refills: 0 | Status: SHIPPED | OUTPATIENT
Start: 2021-03-12 | End: 2021-03-26

## 2021-03-12 RX ORDER — CYCLOBENZAPRINE HCL 10 MG
5-10 TABLET ORAL 3 TIMES DAILY PRN
Qty: 30 TABLET | Refills: 0 | Status: SHIPPED | OUTPATIENT
Start: 2021-03-12 | End: 2022-01-13

## 2021-03-12 RX ORDER — LISINOPRIL 40 MG/1
40 TABLET ORAL DAILY
Qty: 90 TABLET | Refills: 3 | Status: SHIPPED | OUTPATIENT
Start: 2021-03-12 | End: 2022-01-13

## 2021-03-12 ASSESSMENT — MIFFLIN-ST. JEOR: SCORE: 2151.01

## 2021-03-12 NOTE — PROGRESS NOTES
3  SUBJECTIVE:   CC: Aubrey Zavala is an 56 year old male who presents for preventive health visit.       Patient has been advised of split billing requirements and indicates understanding: Yes  Healthy Habits:    Do you get at least three servings of calcium containing foods daily (dairy, green leafy vegetables, etc.)? yes    Amount of exercise or daily activities, outside of work: 2 day(s) per week    Problems taking medications regularly No    Medication side effects: No    Have you had an eye exam in the past two years? yes    Do you see a dentist twice per year? no    Do you have sleep apnea, excessive snoring or daytime drowsiness?no      2. Bio screening to be completed    3. Fall  Musculoskeletal problem/pain      Duration: 2 weeks    Description  Location: back    Intensity:  moderate, severe    Accompanying signs and symptoms: none    History  Previous similar problem: YES  Previous evaluation:  x-ray    Precipitating or alleviating factors:  Trauma or overuse: YES- fall  Aggravating factors include:  Off and on. Bending and motion of getting up. Sometime even when sitting    Therapies tried and outcome: rest/inactivity, heat and aleve     Here today with wife.    Acute concerns:  Fell 2 weeks ago, slipped on ice and fell onto concrete on left side.  Was able to get up unassisted.  He developed immediate midline/left low back pain.  Not radiating down legs.  Its intermittent, worse with movement.  Wondering about cortisone shot.  Prior to current injury had some intermittent back pain, would flare up and he'd have to rest.  He is taking aleve without improvement.  He is able to walk and sit for work.  Unable to lift or bend.  Unable to do household chores.  Not really improving.  Has never had MRI.    Hx of prior back injury in July and 2 years ago fell off ladder, evaluated in ER.  Hx of bad back.    Increased anxiety and stress, mom passed away, wifes dad passed away.  Son was in a lot of trouble,  arrested, felonies.  No prior dx depression and anxiety.  Cant do anything due to covid.  Went to [place] for 3 weeks and felt better there.  Nothing to look forward to.  Feeling worried.  Sleep is ok.      Chronic problems:  Family hx MEN disease- discussed follow up with  last year which was not done.  Brother passed away from this last July.      Family hx CAD- dad with MI at age 53.  Discussed screening stress test last year which was not done    HTN- elevated today and elevated reading June.  Checked it at mother in laws last week, 150s/90s.  No chest pain or shortness of breath.  No headache.      Health Screening:  [place]: neg cologuard 5/2020    To Do:  Labs  zoster    Today's PHQ-2 Score:   PHQ-2 ( 1999 Pfizer) 7/3/2017 7/3/2017   Q1: Little interest or pleasure in doing things 0 0   Q2: Feeling down, depressed or hopeless 0 0   PHQ-2 Score 0 0   Q1: Little interest or pleasure in doing things Not at all -   Q2: Feeling down, depressed or hopeless Not at all -   PHQ-2 Score 0 -       Abuse: Current or Past(Physical, Sexual or Emotional)- No  Do you feel safe in your environment? Yes    Have you ever done Advance Care Planning? (For example, a Health Directive, POLST, or a discussion with a medical provider or your loved ones about your wishes):     Social History     Tobacco Use     Smoking status: Never Smoker     Smokeless tobacco: Never Used   Substance Use Topics     Alcohol use: Yes     Comment: occ     If you drink alcohol do you typically have >3 drinks per day or >7 drinks per week? No                      Last PSA: No results found for: PSA    Reviewed orders with patient. Reviewed health maintenance and updated orders accordingly - Yes  Lab work is in process    Reviewed and updated as needed this visit by clinical staff  Tobacco  Allergies  Meds   Med Hx  Surg Hx  Fam Hx  Soc Hx        Reviewed and updated as needed this visit by Provider                Past Medical History:    Diagnosis Date     CARDIOVASCULAR SCREENING; LDL GOAL LESS THAN 130      Hypertension, benign essential, goal below 140/90 9/16/2016     Unspecified essential hypertension       Past Surgical History:   Procedure Laterality Date     CLOSED RX METATARSAL FX  1978     HC TOOTH EXTRACTION W/FORCEP         ROS:    OBJECTIVE:   There were no vitals taken for this visit.  EXAM:  GENERAL: healthy, alert and no distress  EYES: Eyes grossly normal to inspection, PERRL and conjunctivae and sclerae normal  HENT: ear canals and TM's normal, nose and mouth without ulcers or lesions  NECK: no adenopathy, no asymmetry, masses, or scars and thyroid normal to palpation  RESP: lungs clear to auscultation - no rales, rhonchi or wheezes  CV: regular rate and rhythm, normal S1 S2, no S3 or S4, no murmur, click or rub, no peripheral edema and peripheral pulses strong  ABDOMEN: obese abdomen, some tenderness with palpation epigastric area.  No guarding or rebound tenderness.   ORTHO: Lumber/Thoracic Spine Exam: Tender:  none  Non-tender:  lumbar spinous processes, left para lumbar muscles, right para lumbar muscles  Range of Motion: flexion and extension limited  Strength:  able to heel walk, able to toe walk  Special tests:  negative straight leg raises  SKIN: no suspicious lesions or rashes  NEURO: Normal strength and tone, mentation intact and speech normal  PSYCH: mentation appears normal, affect normal/bright      ASSESSMENT/PLAN:   (Z00.00) Routine general medical examination at a health care facility  (primary encounter diagnosis)  Comment:   Plan: update labs.  Discuss covid vaccine    (M54.5) Acute midline low back pain without sciatica  Comment: acute on chronic, exacerbated by fall  Plan: cyclobenzaprine (FLEXERIL) 10 MG tablet         Discussed good prognosis with conservative management.  NSAIDS, gentle exercise, and flexeril for pain.  Recommended not driving or operating machinery on flexeril.  Discussed avoiding bed  "rest.  Given hx of chronic pain with recurrent flares he wishes to follow up with spine specialist.        (I10) Hypertension, benign essential, goal below 140/90  Comment: uncontrolled  Plan: Albumin Random Urine Quantitative with Creat         Ratio, Basic metabolic panel, Lipid panel         reflex to direct LDL Fasting, lisinopril         (ZESTRIL) 40 MG tablet, hydrochlorothiazide         (HYDRODIURIL) 25 MG tablet        Add hydrochlorothiazide 25mg daily.  Follow up in 1-2 weeks for labs and blood pressure check     (F43.22) Adjustment disorder with anxious mood  Comment: related to pandemic, several losses in the family, and recent incarceration of son   Plan: discussed self care, therapy, and medication as treatment options.  At this time wishes to focus on self care.  Follow up if not improving in 1-2 months    (Z83.41) Family history of MEN (multiple endocrine neoplasia)  Comment:   Plan: CANCER RISK MGMT/CANCER GENETIC COUNSELING         REFERRAL        Follow up with     (Z82.49) Family history of ischemic heart disease  Comment:   Plan: Exercise Stress Test - Adult        Recommend screening stress test    (Z11.59) Encounter for hepatitis C screening test for low risk patient  Comment:   Plan: Hepatitis C Screen Reflex to HCV RNA Quant and         Genotype                Patient has been advised of split billing requirements and indicates understanding: No  COUNSELING:  Reviewed preventive health counseling, as reflected in patient instructions    Estimated body mass index is 42.62 kg/m  as calculated from the following:    Height as of 6/22/20: 1.778 m (5' 10\").    Weight as of 6/22/20: 134.7 kg (297 lb).    Weight management plan: Discussed healthy diet and exercise guidelines    He reports that he has never smoked. He has never used smokeless tobacco.      Counseling Resources:  ATP IV Guidelines  Pooled Cohorts Equation Calculator  FRAX Risk Assessment  ICSI Preventive Guidelines  Dietary " Guidelines for Americans, 2010  USDA's MyPlate  ASA Prophylaxis  Lung CA Screening    MAGED Diaz CNP  [facility]

## 2021-03-12 NOTE — PATIENT INSTRUCTIONS
1.  For back pain increase aleve to 2 pills twice a day.  Can use flexeril 1/2-1 tab three times a day as needed for pain/muscle spasms.  Continue heat/ice as needed.  Continue gentle exercise like walking.  Follow up with back specialist    2.  For blood pressure add hydrochlorothiazide to lisinopril.  Follow up in 1-2 weeks for lab and blood pressure check    3.  lets keep an eye on your mood, consider apps headspace or calm.  Follow up if ongoing symptoms in 1-2 months    4.  Follow up with  for family hx MEN disease    5.  Lets do stress test in the next year.  You may call [phone] to schedule any radiology tests at [facility] or [phone] for [facility].       Preventive Health Recommendations  Male Ages 50 - 64    Yearly exam:             See your health care provider every year in order to  o   Review health changes.   o   Discuss preventive care.    o   Review your medicines if your doctor has prescribed any.     Have a cholesterol test every 5 years, or more frequently if you are at risk for high cholesterol/heart disease.     Have a diabetes test (fasting glucose) every three years. If you are at risk for diabetes, you should have this test more often.     Have a colonoscopy at age 50, or have a yearly FIT test (stool test). These exams will check for colon cancer.      Talk with your health care provider about whether or not a prostate cancer screening test (PSA) is right for you.    You should be tested each year for STDs (sexually transmitted diseases), if you re at risk.     Shots: Get a flu shot each year. Get a tetanus shot every 10 years.     Nutrition:    Eat at least 5 servings of fruits and vegetables daily.     Eat whole-grain bread, whole-wheat pasta and brown rice instead of white grains and rice.     Get adequate Calcium and Vitamin D.     Lifestyle    Exercise for at least 150 minutes a week (30 minutes a day, 5 days a week). This will help you control your  weight and prevent disease.     Limit alcohol to one drink per day.     No smoking.     Wear sunscreen to prevent skin cancer.     See your dentist every six months for an exam and cleaning.     See your eye doctor every 1 to 2 years.

## 2021-03-15 ENCOUNTER — TELEPHONE (OUTPATIENT)
Dept: ONCOLOGY | Facility: CLINIC | Age: 56
End: 2021-03-15

## 2021-03-17 ENCOUNTER — ANCILLARY PROCEDURE (OUTPATIENT)
Dept: GENERAL RADIOLOGY | Facility: CLINIC | Age: 56
End: 2021-03-17
Attending: PHYSICIAN ASSISTANT
Payer: COMMERCIAL

## 2021-03-17 ENCOUNTER — OFFICE VISIT (OUTPATIENT)
Dept: NEUROSURGERY | Facility: CLINIC | Age: 56
End: 2021-03-17
Attending: PHYSICIAN ASSISTANT
Payer: COMMERCIAL

## 2021-03-17 VITALS
BODY MASS INDEX: 40.18 KG/M2 | HEIGHT: 71 IN | WEIGHT: 287 LBS | OXYGEN SATURATION: 98 % | HEART RATE: 100 BPM | DIASTOLIC BLOOD PRESSURE: 96 MMHG | SYSTOLIC BLOOD PRESSURE: 164 MMHG

## 2021-03-17 DIAGNOSIS — M54.50 ACUTE BILATERAL LOW BACK PAIN WITHOUT SCIATICA: Primary | ICD-10-CM

## 2021-03-17 DIAGNOSIS — M54.50 ACUTE BILATERAL LOW BACK PAIN WITHOUT SCIATICA: ICD-10-CM

## 2021-03-17 PROCEDURE — G0463 HOSPITAL OUTPT CLINIC VISIT: HCPCS

## 2021-03-17 PROCEDURE — 99203 OFFICE O/P NEW LOW 30 MIN: CPT | Performed by: PHYSICIAN ASSISTANT

## 2021-03-17 PROCEDURE — 72100 X-RAY EXAM L-S SPINE 2/3 VWS: CPT | Performed by: RADIOLOGY

## 2021-03-17 ASSESSMENT — MIFFLIN-ST. JEOR: SCORE: 2151.01

## 2021-03-17 NOTE — LETTER
3/17/2021         RE: Aubrey Zavala  [address]  [place] [address]        Dear Colleague,    Thank you for referring your patient, Aubrey Zavala, to the [facility] NEUROSURGERY [facility]INIC [place]. Please see a copy of my visit note below.    Neurosurgery Consult    HPI    Mr. Zavala is a 56-year-old male referred to us for evaluation of low back pain.  The patient fell on the ice 2 weeks ago and landed on his left side.  He reports since then he has had persistent significant back pain without radicular symptoms, numbness, weakness or bowel or bladder symptoms.  He has no symptoms going down his legs.  He reports the pain in a bandlike distribution also in the midline.  He has not had any imaging at this point.    Medical history  Number obesity  Hypertension    Social history  Works as a  for [employer]      B/P: 164/96[just started BP med[, T: Data Unavailable, P: 100, R: Data Unavailable       Exam    Alert and oriented no acute distress  Bilateral lower extremities with 5/5 strength  Reflexes 2+ patella/ankle  Negative straight leg raise bilaterally    Lumbar spine very tender to palpation in the midline, toward the lower lumbar spine    Able to stand on heels and toes  Gait is normal    Imaging    No imaging to review    Assessment    Acute midline low back pain without radiculopathy  Status post fall    Plan:      I sent the patient for lumbar x-ray, I will follow-up with him with the results when they are available.  There are no acute fractures or other concerning findings we will have him begin physical therapy.    Total time of 30 minutes spent with the patient today in counseling and coordination of care.      Again, thank you for allowing me to participate in the care of your patient.        Sincerely,        Reno Allison PA-C

## 2021-03-17 NOTE — NURSING NOTE
"March 17, 2021 3:32 PM   Aubrey Zavala is a 56 year old male who presents for:    Chief Complaint   Patient presents with     Consult     Low back pain     Referral     Olivia Nunn APRN CNP     Initial Vitals: BP (!) 164/96   Pulse 100   Ht 5' 10.5\" (1.791 m)   Wt 287 lb (130.2 kg)   SpO2 98%   BMI 40.60 kg/m   Estimated body mass index is 40.6 kg/m  as calculated from the following:    Height as of this encounter: 5' 10.5\" (1.791 m).    Weight as of this encounter: 287 lb (130.2 kg). Body surface area is 2.54 meters squared.  Data Unavailable Comment: Data Unavailable       Clinical concerns: Aubrey Zavala is here today for bilateral lumbar pain  Natalee Velázquez MA  "

## 2021-03-17 NOTE — PROGRESS NOTES
Neurosurgery Consult    HPI    Mr. Zavala is a 56-year-old male referred to us for evaluation of low back pain.  The patient fell on the ice 2 weeks ago and landed on his left side.  He reports since then he has had persistent significant back pain without radicular symptoms, numbness, weakness or bowel or bladder symptoms.  He has no symptoms going down his legs.  He reports the pain in a bandlike distribution also in the midline.  He has not had any imaging at this point.    Medical history  Number obesity  Hypertension    Social history  Works as a  for [employer]      B/P: 164/96[just started BP med[, T: Data Unavailable, P: 100, R: Data Unavailable       Exam    Alert and oriented no acute distress  Bilateral lower extremities with 5/5 strength  Reflexes 2+ patella/ankle  Negative straight leg raise bilaterally    Lumbar spine very tender to palpation in the midline, toward the lower lumbar spine    Able to stand on heels and toes  Gait is normal    Imaging    No imaging to review    Assessment    Acute midline low back pain without radiculopathy  Status post fall    Plan:      I sent the patient for lumbar x-ray, I will follow-up with him with the results when they are available.  There are no acute fractures or other concerning findings we will have him begin physical therapy.    Total time of 30 minutes spent with the patient today in counseling and coordination of care.

## 2021-03-19 DIAGNOSIS — M54.50 ACUTE MIDLINE LOW BACK PAIN WITHOUT SCIATICA: ICD-10-CM

## 2021-03-19 NOTE — TELEPHONE ENCOUNTER
"Per Reno Allison PA-C, \"Patient's x-ray does not demonstrate any fractures.  He can begin physical therapy.\"    Attempted to reach out to patient, no answer. Left voice message for patient to call clinic back to further discuss.       Asked patient to call back for xray results.   "

## 2021-03-22 ENCOUNTER — VIRTUAL VISIT (OUTPATIENT)
Dept: ONCOLOGY | Facility: CLINIC | Age: 56
End: 2021-03-22
Attending: GENETIC COUNSELOR, MS
Payer: COMMERCIAL

## 2021-03-22 DIAGNOSIS — Z00.00 PREVENTATIVE HEALTH CARE: ICD-10-CM

## 2021-03-22 DIAGNOSIS — Z80.9 FAMILY HISTORY OF CANCER: ICD-10-CM

## 2021-03-22 DIAGNOSIS — Z83.41 FAMILY HISTORY OF MULTIPLE ENDOCRINE NEOPLASIA TYPE 1 (MEN1): Primary | ICD-10-CM

## 2021-03-22 PROCEDURE — 96040 HC GENETIC COUNSELING, EACH 30 MINUTES: CPT | Mod: GT | Performed by: GENETIC COUNSELOR, MS

## 2021-03-22 RX ORDER — CYCLOBENZAPRINE HCL 10 MG
5-10 TABLET ORAL 3 TIMES DAILY PRN
Qty: 30 TABLET | Refills: 0 | Status: CANCELLED | OUTPATIENT
Start: 2021-03-22

## 2021-03-22 NOTE — LETTER
Cancer Risk Management  Program Locations    [facility]  [facility]  [facility]  [facility]  Mailing Address  Cancer Risk Management Program  [facility]  [address]  [place] [address]    New patient appointments  [phone]  March 25, 2021    Aubrey Zavala  [address]  [place] [address]      Dear Aubrey,    It was a pleasure speaking with you on the phone for genetic counseling on 3/22/2021. Here is a copy of the progress note from our discussion. If you have any additional questions, please feel free to call.    Referring Provider: MAGED Diaz CNP    Presenting Information:   I spoke with Aubrey Zavala over the phone today for genetic counseling to discuss his family history of cancer and Multiple Endocrine Neoplasia Type I (MEN1) syndrome. With his permission, this appointment was conducted over the phone due to COVID-19 precautions. We talked today to review this history, cancer screening recommendations, and available genetic testing options.    Personal History:  Aubrey is a 56 year old male. He does not have any personal history of cancer. He has not had a colonoscopy. He has had the Cologuard test on 5/6/20 and the FIT test on 4/9/19, which were both negative. Aubrey reported no history of tobacco use and alcohol use of a couple drinks per week.    Aubrey is referred to genetic counseling today due to his family history of MEN1. He reports that he and his family were involved in a study through [facility] in the 1980s. 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 He presents today for confirmation testing.    Family History: (Please see scanned pedigree for detailed family history information)  Siblings:    His sister is 58 years old and  "has a history of lung cancer diagnosed around 2001. He thinks this was possibly an \"unusual type\" of lung cancer, although he does not have further details at this time. She does have a history of smoking. He reports that she also has other health issues that she is dealing with currently although he does not have further information about this. She underwent genetic testing in 2002 through [facility]. Analysis of the MEN1 gene revealed a mutation called [id number]. This report was provided for review today. He reports that her daughter (late 20s) has also tested positive for this mutation.    His brother passed away last year at age 60. He also had MEN1. He reportedly had a cancer of his thymus that was removed. He later may have had a sarcoma. Aubrey has very limited details about his medical history. He had one daughter (age 26), unknown if she has had any genetic testing.    His other sister is 59 years old and has reportedly tested negative for the familial MEN1 mutation. She has no known history of cancer or tumors.  Maternal:    His mother passed away at age 83 with no known history of cancer.    He is not aware of any diagnoses of cancer in his maternal aunts, uncle, or cousins, but but he has limited information about their history.    He reports that both his grandfather and grandmother had a history of cancer (unknown types). Both passed away in their 80s.  Paternal:    His father also had a diagnosis of MEN1. He had a heart attack at age 54. He was then diagnosed with an astrocytoma at age 62. He passed away at age 63 due to a heart attack. He possibly also had a history of carcinoid tumors. He had previously had surgery to remove 3.5 of his parathyroid glands.    His father had 2 sisters and 1 brother all who reportedly also had MEN1. He believes that they did have tumors and/or cancers although he has no further details about their history. One of his aunts passed away in her 30s, his other aunt passed away at " age 70, and his uncle passed away at age 67. He reports that one of his cousins passed away at age 58, he believes that he also had MEN1.    His grandmother passed away in 1991. She had multiple health issues although he has very limited details about this. She reportedly also had MEN1.    His grandfather passed away in 1969 possibly due to cancer (unknown type).    His maternal ethnicity is Bahamian. His paternal ethnicity is Bahamian. There is no known Ashkenazi Pentecostal ancestry on either side of his family.     Discussion:    We reviewed the features of sporadic, familial, and hereditary cancers. There is a known diagnosis of Multiple Endocrine Neoplasia Type I in multiple of Aubrey's family members. There is a known MEN1 mutation that has previously been detected in his sister. He has very limited information about the cancers and other clinical history in all of his relatives, therefore we discussed that it is difficult to assess whether there are any other potential hereditary cancer syndromes in his family in addition to MEN1. He stated that he will try to find out some more information from his sister.  We discussed the natural history and genetics of hereditary cancer. Specifically, we discussed MEN1. Multiple endocrine neoplasia affects the endocrine system, which produces hormones. Individuals with multiple endocrine neoplasia can develop multiple tumors of the endocrine glands, which can be malignant or benign. There are several forms of multiple endocrine neoplasia.   Individuals with multiple endocrine neoplasia type 1 (caused by mutations in the MEN1 gene) typically develop tumors of the parathyroid gland, pituitary gland, and gastro-entero-pancreatic (GEP) tract. Individuals can also have hyperparathyroidism and are at risk to develop carcinoid tumors.   Multiple endocrine neoplasia type 4 is very similar to type 1, but is caused by mutations in the CDKN1B gene. Hyperparathyroidism is a common  feature of type 4.    We discussed that nearly all individuals with MEN1 will have hyperparathyroidism (causing hypercalcemia) by age 50. A comprehensive metabolic panel for Aubrey from 3/11/2020 showed a calcium level in the normal range. He does not believe that he has ever been told that he has high calcium.     A detailed handout about genetic testing will be provided to Aubrey at the end of our appointment today and can be found in the after visit summary. Topics included: inheritance pattern, cancer risks, cancer screening recommendations, and also risks, benefits and limitations of testing.    Based on his personal and family history, Aubrey meets current National Comprehensive Cancer Network (NCCN) criteria for genetic testing of the MEN1 gene.      We discussed that there are additional genes that could cause increased risk for cancer. Because Aubrey has very limited information about the cancers in his family members, it is difficult to determine whether there are cancers that are unrelated to MEN1 in the family. He also reported cancers in both of his maternal grandparents (which is not the side of the family with MEN1). Therefore, we discussed the option to analyze additional genes on Aubrey's testing. As many genes present with overlapping features in a family and accurate cancer risk cannot always be established based upon the pedigree analysis alone, it would be reasonable for Aubrey to consider panel genetic testing to analyze multiple genes at once.    We reviewed genetic testing options for [place] based on his family history: 1) testing of the familial MEN1 mutation only, 2) testing for MEN1 plus additional genes associated with other types of cancer. uAbrey expressed an interest in more broad testing. He opted for a CustomNext-Cancer panel (a combination of the CancerNext panel plus MEN1).  Genetic testing is available for 36 genes associated with hereditary cancer: CancerNext (APC, RADHA, AXIN2,  BARD1, BMPR1A, BRCA1, BRCA2, BRIP1, CDH1, CDK4, CDKN2A, CHEK2, DICER1, EPCAM, GREM1, HOXB13, MLH1, MSH2, MSH3, MSH6, MUTYH, NBN, NF1, NTHL1, PALB2, PMS2, POLD1, POLE, PTEN, RAD51C, RAD51D, RECQL, SMAD4, SMARCA4, STK11, and TP53).  We discussed that many of the genes in the CancerNext panel are associated with specific hereditary cancer syndromes and published management guidelines: Hereditary Breast and Ovarian Cancer syndrome (BRCA1, BRCA2), Porter syndrome (MLH1, MSH2, MSH6, PMS2, EPCAM), Familial Adenomatous Polyposis (APC), Hereditary Diffuse Gastric Cancer (CDH1), Familial Atypical Multiple Mole Melanoma syndrome (CDK4, CDKN2A), Juvenile Polyposis syndrome (BMPR1A, SMAD4), Cowden syndrome (PTEN), Li Fraumeni syndrome (TP53), Peutz-Jeghers syndrome (STK11), MUTYH Associated Polyposis (MUTYH), and Neurofibromatosis type 1 (NF1).   The RADHA, AXIN2, BRIP1, CHEK2, GREM1, MSH3, NBN, NTHL1, PALB2, POLD1, POLE, RAD51C, and RAD51D genes are associated with increased cancer risk and have published management guidelines for certain cancers.    The remaining genes (BARD1, DICER1, HOXB13, RECQL, and SMARCA4) are associated with increased cancer risk and may allow us to make medical recommendations when mutations are identified.    Aubrey's testing will also include analysis of the MEN1 gene (including the known familial mutation).  Due to COVID-19 precautions consent was obtained over the phone today. This is indicated on the consent form, which also includes my signature (as the provider who obtained consent). Genetic testing via a CustomNext-Cancer panel (a combination of the CancerNext panel plus MEN1) will be sent to [facility]. Aubrey will have his blood drawn for testing this Thursday 3/25 when he is already having other labs drawn.. Turnaround time from date when sample is received at the lab: approximately 3-4 weeks.    Medical Management: For Aubrey, we reviewed that the information from genetic  testing may determine:    additional cancer screening for which Aubrey may qualify (i.e. biochemical screening and/or imaging for tumors associated with MEN1, more frequent colonoscopies, more frequent dermatologic exams, etc.),    and targeted chemotherapies if he were to develop certain cancers in the future (i.e. immunotherapy for individuals with Porter syndrome, PARP inhibitors, etc.).     These recommendations will be discussed in detail once genetic testing is completed.     Plan:  1) Today Aubrey elected to proceed with genetic testing via a CustomNext-Cancer panel (a combination of the CancerNext panel plus MEN1) offered by [facility].  2) This information should be available in 4-5 weeks.  3) Aubrey will be scheduled for a virtual visit (phone or video) to discuss the results.    Harleen Canas MS, [facility]  Licensed, Certified Genetic Counselor  Office: [phone]  Email: [email or web address]    Addendum 3/23/21:    Aubrey emailed me the following additional information to add to his family history:    Mari - sister- Ovarian, lung, and pancreatic cancers  Sharon-Mari s daughter had pancreatic cancer and it is back.  Yee my aunt had Breast,Lung,bone, and brain cancer.  Donavan menjivarjeramy cousin- not sure on type  Mari,Mahad,Yee and Karlee had 5 parathyroids.                                                    Assessing Cancer Risk  Only about 5-10% of cancers are thought to be due to an inherited cancer susceptibility gene.    These families often have:    Several people with the same or related types of cancer    Cancers diagnosed at a young age (before age 50)    Individuals with more than one primary cancer    Multiple generations of the family affected with cancer    Genetic Testing  Genetic testing involves a simple blood test and will look at the genetic information in select genes for any harmful mutations that are associated with increased cancer risk.  If possible, it is recommended that the  person(s) who has had cancer be tested before other family members.  That person will give us the most useful information about whether or not a specific gene is associated with the cancer in the family.     Results  There are three possible results of genetic testing:    Positive--a harmful mutation was identified    Negative--no mutation was identified    Variant of unknown significance--a variation in one of the genes was identified, but it is unclear how this impacts cancer risk in the family    Advantages and Disadvantages  There are advantages and disadvantages to genetic testing of these genes.    Advantages    May clarify your cancer risk    Can help you make medical decisions    May explain the cancers in your family    May give useful information to your family members (if you share your results)    Disadvantages    Possible negative emotional impact of learning about inherited cancer risk    Uncertainty in interpreting a negative test result in some situations    Possible genetic discrimination concerns (see below)    Inheritance   Mutations in most cancer risk genes are inherited in an autosomal dominant pattern.  This means that if a parent has a mutation, each of his or her children will have a 50% chance of inheriting that same mutation.  Therefore, each child--male or female--would have a 50% chance of being at increased risk for developing cancer.                                              Image obtained from Genetics Home Reference, 2013     Genetic Information Nondiscrimination Act (SWETHA)  SWETHA is a federal law that protects individuals from health insurance or employment discrimination based on a genetic test result alone.  Although rare, there are currently no legal protections in terms of life insurance, long term care, or disability insurances.  Visit the National Human Genome Research [place] at Genome.gov/42906547 to learn more.    Reducing Cancer Risk  If a harmful mutation is found in  a cancer risk gene, there may be certain screens or preventative surgeries that can be offered. This information will be discussed after genetic testing is completed. If no mutations are found on genetic testing, screening is then recommended based on personal and/or family history of cancer.     Questions to Think About Regarding Genetic Testing    What effect will the test result have on me and my relationship with my family members if I have an inherited gene mutation?  If I don t have a gene mutation?    Should I share my test results, and how will my family react to this news, which may also affect them?    Are my children ready to learn new information that may one day affect their own health?    Resources  American Cancer Society (ACS) cancer.org   National Cancer [place] (NCI) cancer.gov     Please call us if you have any questions or concerns.   Cancer Risk Management Program [phone] ([phone])  ? Oliver Rangel, MS, [facility] [phone]  ? Niki Davila, MS, [facility]  [phone]  ? Radha Gongora, MS, [facility]  [phone]  ? Saul Meek, MS, [facility]  [phone]  ? Martina Dominguez, MS, [facility] [phone]  ? Merline Chu, MS, [facility] [phone]  ? Harleen Canas, MS, [facility]  [phone]

## 2021-03-22 NOTE — TELEPHONE ENCOUNTER
Informed pt on Reno Allison's recommendations and gave him telephone number to schedule PT.    He states pain is so severe he wants to try medrol dose pack or TARSHA. Pt is scheduled to have COVID vaccine tomorrow, I informed him this may effect his ability to have steroids in the near future.

## 2021-03-22 NOTE — PROGRESS NOTES
"3/22/2021    Aubrey is a 56 year old who is being evaluated via a billable telephone visit.      Phone call duration: 60 minutes    *Visit originally scheduled as a video visit, but was converted to a phone call due to connection issues.    Referring Provider: MAGED Diaz CNP    Presenting Information:   I spoke with Aubrey Zavala over the phone today for genetic counseling to discuss his family history of cancer and Multiple Endocrine Neoplasia Type I (MEN1) syndrome. With his permission, this appointment was conducted over the phone due to COVID-19 precautions. We talked today to review this history, cancer screening recommendations, and available genetic testing options.    Personal History:  Aubrey is a 56 year old male. He does not have any personal history of cancer. He has not had a colonoscopy. He has had the Cologuard test on 5/6/20 and the FIT test on 4/9/19, which were both negative. Aubrey reported no history of tobacco use and alcohol use of a couple drinks per week.    Aubrey is referred to genetic counseling today due to his family history of MEN1. He reports that he and his family were involved in a study through [facility] in the 1980s. 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 He presents today for confirmation testing.    Family History: (Please see scanned pedigree for detailed family history information)  Siblings:    His sister is 58 years old and has a history of lung cancer diagnosed around 2001. He thinks this was possibly an \"unusual type\" of lung cancer, although he does not have further details at this time. She does have a history of smoking. He reports that she also has other health issues that she is dealing with currently although he does not have further information about this. She underwent genetic testing in 2002 through [facility]. Analysis of the MEN1 gene revealed a mutation called [id number]. This " report was provided for review today. He reports that her daughter (late 20s) has also tested positive for this mutation.    His brother passed away last year at age 60. He also had MEN1. He reportedly had a cancer of his thymus that was removed. He later may have had a sarcoma. Aubrey has very limited details about his medical history. He had one daughter (age 26), unknown if she has had any genetic testing.    His other sister is 59 years old and has reportedly tested negative for the familial MEN1 mutation. She has no known history of cancer or tumors.  Maternal:    His mother passed away at age 83 with no known history of cancer.    He is not aware of any diagnoses of cancer in his maternal aunts, uncle, or cousins, but but he has limited information about their history.    He reports that both his grandfather and grandmother had a history of cancer (unknown types). Both passed away in their 80s.  Paternal:    His father also had a diagnosis of MEN1. He had a heart attack at age 54. He was then diagnosed with an astrocytoma at age 62. He passed away at age 63 due to a heart attack. He possibly also had a history of carcinoid tumors. He had previously had surgery to remove 3.5 of his parathyroid glands.    His father had 2 sisters and 1 brother all who reportedly also had MEN1. He believes that they did have tumors and/or cancers although he has no further details about their history. One of his aunts passed away in her 30s, his other aunt passed away at age 70, and his uncle passed away at age 67. He reports that one of his cousins passed away at age 58, he believes that he also had MEN1.    His grandmother passed away in 1991. She had multiple health issues although he has very limited details about this. She reportedly also had MEN1.    His grandfather passed away in 1969 possibly due to cancer (unknown type).    His maternal ethnicity is English. His paternal ethnicity is English. There is no known  Ashkenazi Mandaen ancestry on either side of his family.     Discussion:    We reviewed the features of sporadic, familial, and hereditary cancers. There is a known diagnosis of Multiple Endocrine Neoplasia Type I in multiple of Aubrey's family members. There is a known MEN1 mutation that has previously been detected in his sister. He has very limited information about the cancers and other clinical history in all of his relatives, therefore we discussed that it is difficult to assess whether there are any other potential hereditary cancer syndromes in his family in addition to MEN1. He stated that he will try to find out some more information from his sister.  We discussed the natural history and genetics of hereditary cancer. Specifically, we discussed MEN1. Multiple endocrine neoplasia affects the endocrine system, which produces hormones. Individuals with multiple endocrine neoplasia can develop multiple tumors of the endocrine glands, which can be malignant or benign. There are several forms of multiple endocrine neoplasia.   Individuals with multiple endocrine neoplasia type 1 (caused by mutations in the MEN1 gene) typically develop tumors of the parathyroid gland, pituitary gland, and gastro-entero-pancreatic (GEP) tract. Individuals can also have hyperparathyroidism and are at risk to develop carcinoid tumors.   Multiple endocrine neoplasia type 4 is very similar to type 1, but is caused by mutations in the CDKN1B gene. Hyperparathyroidism is a common feature of type 4.    We discussed that nearly all individuals with MEN1 will have hyperparathyroidism (causing hypercalcemia) by age 50. A comprehensive metabolic panel for Aubrey from 3/11/2020 showed a calcium level in the normal range. He does not believe that he has ever been told that he has high calcium.     A detailed handout about genetic testing will be provided to Aubrey at the end of our appointment today and can be found in the after visit summary.  Topics included: inheritance pattern, cancer risks, cancer screening recommendations, and also risks, benefits and limitations of testing.    Based on his personal and family history, Aubrey meets current National Comprehensive Cancer Network (NCCN) criteria for genetic testing of the MEN1 gene.      We discussed that there are additional genes that could cause increased risk for cancer. Because Aubrey has very limited information about the cancers in his family members, it is difficult to determine whether there are cancers that are unrelated to MEN1 in the family. He also reported cancers in both of his maternal grandparents (which is not the side of the family with MEN1). Therefore, we discussed the option to analyze additional genes on Aubrey's testing. As many genes present with overlapping features in a family and accurate cancer risk cannot always be established based upon the pedigree analysis alone, it would be reasonable for Aubrey to consider panel genetic testing to analyze multiple genes at once.    We reviewed genetic testing options for [place] based on his family history: 1) testing of the familial MEN1 mutation only, 2) testing for MEN1 plus additional genes associated with other types of cancer. Aubrey expressed an interest in more broad testing. He opted for a CustomNext-Cancer panel (a combination of the CancerNext panel plus MEN1).  Genetic testing is available for 36 genes associated with hereditary cancer: CancerNext (APC, RADHA, AXIN2, BARD1, BMPR1A, BRCA1, BRCA2, BRIP1, CDH1, CDK4, CDKN2A, CHEK2, DICER1, EPCAM, GREM1, HOXB13, MLH1, MSH2, MSH3, MSH6, MUTYH, NBN, NF1, NTHL1, PALB2, PMS2, POLD1, POLE, PTEN, RAD51C, RAD51D, RECQL, SMAD4, SMARCA4, STK11, and TP53).  We discussed that many of the genes in the CancerNext panel are associated with specific hereditary cancer syndromes and published management guidelines: Hereditary Breast and Ovarian Cancer syndrome (BRCA1, BRCA2), Porter syndrome  (MLH1, MSH2, MSH6, PMS2, EPCAM), Familial Adenomatous Polyposis (APC), Hereditary Diffuse Gastric Cancer (CDH1), Familial Atypical Multiple Mole Melanoma syndrome (CDK4, CDKN2A), Juvenile Polyposis syndrome (BMPR1A, SMAD4), Cowden syndrome (PTEN), Li Fraumeni syndrome (TP53), Peutz-Jeghers syndrome (STK11), MUTYH Associated Polyposis (MUTYH), and Neurofibromatosis type 1 (NF1).   The RADHA, AXIN2, BRIP1, CHEK2, GREM1, MSH3, NBN, NTHL1, PALB2, POLD1, POLE, RAD51C, and RAD51D genes are associated with increased cancer risk and have published management guidelines for certain cancers.    The remaining genes (BARD1, DICER1, HOXB13, RECQL, and SMARCA4) are associated with increased cancer risk and may allow us to make medical recommendations when mutations are identified.    Aburey's testing will also include analysis of the MEN1 gene (including the known familial mutation).  Due to COVID-19 precautions consent was obtained over the phone today. This is indicated on the consent form, which also includes my signature (as the provider who obtained consent). Genetic testing via a CustomNext-Cancer panel (a combination of the CancerNext panel plus MEN1) will be sent to [facility]. Aubrey will have his blood drawn for testing this Thursday 3/25 when he is already having other labs drawn.. Turnaround time from date when sample is received at the lab: approximately 3-4 weeks.    Medical Management: For Aubrey, we reviewed that the information from genetic testing may determine:    additional cancer screening for which Aubrey may qualify (i.e. biochemical screening and/or imaging for tumors associated with MEN1, more frequent colonoscopies, more frequent dermatologic exams, etc.),    and targeted chemotherapies if he were to develop certain cancers in the future (i.e. immunotherapy for individuals with Porter syndrome, PARP inhibitors, etc.).     These recommendations will be discussed in detail once genetic testing  is completed.     Plan:  1) Today Aubrey elected to proceed with genetic testing via a CustomNext-Cancer panel (a combination of the CancerNext panel plus MEN1) offered by [facility].  2) This information should be available in 4-5 weeks.  3) Aubrey will be scheduled for a virtual visit (phone or video) to discuss the results.    Time spent over phone: 60 minutes    Harleen Canas MS, [facility]  Licensed, Certified Genetic Counselor  Office: [phone]  Email: [email or web address]    Addendum 3/23/21:    Aubrey emailed me the following additional information to add to his family history:    Mari - sister- Ovarian, lung, and pancreatic cancers  Sharon-Mari s daughter had pancreatic cancer and it is back.  Yee my aunt had Breast,Lung,bone, and brain cancer.  Donavan pita cousin- not sure on type  Mari,Mahad,Yee and Karlee had 5 parathyroids.

## 2021-03-23 ENCOUNTER — IMMUNIZATION (OUTPATIENT)
Dept: NURSING | Facility: CLINIC | Age: 56
End: 2021-03-23
Payer: COMMERCIAL

## 2021-03-23 PROCEDURE — 0001A PR COVID VAC PFIZER DIL RECON 30 MCG/0.3 ML IM: CPT

## 2021-03-23 PROCEDURE — 91300 PR COVID VAC PFIZER DIL RECON 30 MCG/0.3 ML IM: CPT

## 2021-03-25 ENCOUNTER — THERAPY VISIT (OUTPATIENT)
Dept: PHYSICAL THERAPY | Facility: CLINIC | Age: 56
End: 2021-03-25
Attending: PHYSICIAN ASSISTANT
Payer: COMMERCIAL

## 2021-03-25 ENCOUNTER — ALLIED HEALTH/NURSE VISIT (OUTPATIENT)
Dept: NURSING | Facility: CLINIC | Age: 56
End: 2021-03-25
Payer: COMMERCIAL

## 2021-03-25 VITALS — HEART RATE: 95 BPM | OXYGEN SATURATION: 96 % | SYSTOLIC BLOOD PRESSURE: 131 MMHG | DIASTOLIC BLOOD PRESSURE: 89 MMHG

## 2021-03-25 DIAGNOSIS — M54.50 ACUTE BILATERAL LOW BACK PAIN WITHOUT SCIATICA: ICD-10-CM

## 2021-03-25 DIAGNOSIS — Z01.30 BP CHECK: Primary | ICD-10-CM

## 2021-03-25 DIAGNOSIS — M54.50 CHRONIC BILATERAL LOW BACK PAIN WITHOUT SCIATICA: ICD-10-CM

## 2021-03-25 DIAGNOSIS — I10 HYPERTENSION, BENIGN ESSENTIAL, GOAL BELOW 140/90: ICD-10-CM

## 2021-03-25 DIAGNOSIS — G89.29 CHRONIC BILATERAL LOW BACK PAIN WITHOUT SCIATICA: ICD-10-CM

## 2021-03-25 DIAGNOSIS — Z11.59 ENCOUNTER FOR HEPATITIS C SCREENING TEST FOR LOW RISK PATIENT: ICD-10-CM

## 2021-03-25 LAB
ANION GAP SERPL CALCULATED.3IONS-SCNC: 7 MMOL/L (ref 3–14)
BUN SERPL-MCNC: 20 MG/DL (ref 7–30)
CALCIUM SERPL-MCNC: 9.7 MG/DL (ref 8.5–10.1)
CHLORIDE SERPL-SCNC: 100 MMOL/L (ref 94–109)
CHOLEST SERPL-MCNC: 201 MG/DL
CO2 SERPL-SCNC: 27 MMOL/L (ref 20–32)
CREAT SERPL-MCNC: 1.11 MG/DL (ref 0.66–1.25)
CREAT UR-MCNC: 232 MG/DL
GFR SERPL CREATININE-BSD FRML MDRD: 74 ML/MIN/{1.73_M2}
GLUCOSE SERPL-MCNC: 116 MG/DL (ref 70–99)
HCV AB SERPL QL IA: NONREACTIVE
HDLC SERPL-MCNC: 61 MG/DL
LDLC SERPL CALC-MCNC: 78 MG/DL
MICROALBUMIN UR-MCNC: 40 MG/L
MICROALBUMIN/CREAT UR: 17.24 MG/G CR (ref 0–17)
NONHDLC SERPL-MCNC: 140 MG/DL
POTASSIUM SERPL-SCNC: 4.7 MMOL/L (ref 3.4–5.3)
SODIUM SERPL-SCNC: 134 MMOL/L (ref 133–144)
TRIGL SERPL-MCNC: 312 MG/DL

## 2021-03-25 PROCEDURE — 97161 PT EVAL LOW COMPLEX 20 MIN: CPT | Mod: GP | Performed by: PHYSICAL THERAPIST

## 2021-03-25 PROCEDURE — 80061 LIPID PANEL: CPT | Performed by: NURSE PRACTITIONER

## 2021-03-25 PROCEDURE — 86803 HEPATITIS C AB TEST: CPT | Performed by: NURSE PRACTITIONER

## 2021-03-25 PROCEDURE — 97140 MANUAL THERAPY 1/> REGIONS: CPT | Mod: GP | Performed by: PHYSICAL THERAPIST

## 2021-03-25 PROCEDURE — 36415 COLL VENOUS BLD VENIPUNCTURE: CPT | Performed by: NURSE PRACTITIONER

## 2021-03-25 PROCEDURE — 80048 BASIC METABOLIC PNL TOTAL CA: CPT | Performed by: NURSE PRACTITIONER

## 2021-03-25 PROCEDURE — 82043 UR ALBUMIN QUANTITATIVE: CPT | Performed by: NURSE PRACTITIONER

## 2021-03-25 PROCEDURE — 99207 PR NO CHARGE NURSE ONLY: CPT

## 2021-03-25 PROCEDURE — 97110 THERAPEUTIC EXERCISES: CPT | Mod: GP | Performed by: PHYSICAL THERAPIST

## 2021-03-25 NOTE — PATIENT INSTRUCTIONS
Assessing Cancer Risk  Only about 5-10% of cancers are thought to be due to an inherited cancer susceptibility gene.    These families often have:    Several people with the same or related types of cancer    Cancers diagnosed at a young age (before age 50)    Individuals with more than one primary cancer    Multiple generations of the family affected with cancer    Genetic Testing  Genetic testing involves a simple blood test and will look at the genetic information in select genes for any harmful mutations that are associated with increased cancer risk.  If possible, it is recommended that the person(s) who has had cancer be tested before other family members.  That person will give us the most useful information about whether or not a specific gene is associated with the cancer in the family.     Results  There are three possible results of genetic testing:    Positive--a harmful mutation was identified    Negative--no mutation was identified    Variant of unknown significance--a variation in one of the genes was identified, but it is unclear how this impacts cancer risk in the family    Advantages and Disadvantages  There are advantages and disadvantages to genetic testing of these genes.    Advantages    May clarify your cancer risk    Can help you make medical decisions    May explain the cancers in your family    May give useful information to your family members (if you share your results)    Disadvantages    Possible negative emotional impact of learning about inherited cancer risk    Uncertainty in interpreting a negative test result in some situations    Possible genetic discrimination concerns (see below)    Inheritance   Mutations in most cancer risk genes are inherited in an autosomal dominant pattern.  This means that if a parent has a mutation, each of his or her children will have a 50% chance of inheriting that same mutation.  Therefore, each child--male or female--would have a 50% chance of  being at increased risk for developing cancer.                                              Image obtained from Genetics Home Reference, 2013     Genetic Information Nondiscrimination Act (SWETHA)  SWETHA is a federal law that protects individuals from health insurance or employment discrimination based on a genetic test result alone.  Although rare, there are currently no legal protections in terms of life insurance, long term care, or disability insurances.  Visit the National Human Genome Research [place] at Genome.gov/61551055 to learn more.    Reducing Cancer Risk  If a harmful mutation is found in a cancer risk gene, there may be certain screens or preventative surgeries that can be offered. This information will be discussed after genetic testing is completed. If no mutations are found on genetic testing, screening is then recommended based on personal and/or family history of cancer.     Questions to Think About Regarding Genetic Testing    What effect will the test result have on me and my relationship with my family members if I have an inherited gene mutation?  If I don t have a gene mutation?    Should I share my test results, and how will my family react to this news, which may also affect them?    Are my children ready to learn new information that may one day affect their own health?    Resources  American Cancer Society (ACS) cancer.org   National Cancer [place] (NCI) cancer.gov     Please call us if you have any questions or concerns.   Cancer Risk Management Program [phone] ([phone])  ? Oliver Rangel, MS, [facility] [phone]  ? Niki Davila, MS, [facility]  [phone]  ? Radha Gongora, MS, [facility]  [phone]  ? Saul Meek, MS, [facility]  [phone]  ? Martina Dominguez, MS, [facility] [phone]  ? Merline Chu, MS, [facility] [phone]  ? Harleen Canas, MS, [facility]  [phone]

## 2021-03-25 NOTE — NURSING NOTE
Aubrey Zavala is a 56 year old patient who comes in today for a Blood Pressure check.  Initial BP:  /89 (BP Location: Left arm, Patient Position: Sitting, Cuff Size: Adult Large)   Pulse 95   SpO2 96%      95  Disposition: follow-up as previously indicated by provider

## 2021-03-26 DIAGNOSIS — I10 HYPERTENSION, BENIGN ESSENTIAL, GOAL BELOW 140/90: ICD-10-CM

## 2021-03-26 PROBLEM — G89.29 CHRONIC BILATERAL LOW BACK PAIN WITHOUT SCIATICA: Status: ACTIVE | Noted: 2021-03-26

## 2021-03-26 PROBLEM — M54.50 CHRONIC BILATERAL LOW BACK PAIN WITHOUT SCIATICA: Status: ACTIVE | Noted: 2021-03-26

## 2021-03-26 RX ORDER — HYDROCHLOROTHIAZIDE 25 MG/1
25 TABLET ORAL DAILY
Qty: 90 TABLET | Refills: 3 | Status: SHIPPED | OUTPATIENT
Start: 2021-03-26 | End: 2022-01-13

## 2021-03-26 NOTE — PROGRESS NOTES
"Physical Therapy Initial Evaluation    Physical Therapy Initial Examination/Evaluation  March 25, 2021    Aubrey Zavala  is a 56 year old  male referred to physical therapy by [facility] for treatment of LBP.      DOI/onset 2-23-21  Mechanism of injury slipped and fell on the ice.  DOS n/a  Prior treatment none.     Chief Complaint:   Limited mobility due to LBP.   Pain location: B LS area  Quality: sharp  Constant/Intermittent: intermittent  Time of day: no time pattern  Symptoms have remained the same since onset.    Current pain 5/10.  Pain at best 3/10.  Pain at worst 7/10.    Symptoms aggravated by transfers, standing, walking.    Symptoms improved with sitting.     Social history:  Lives in own home with wife.    Occupation: .  Job duties:  Computer/desk work.    Patient having difficulty with ADLs: any standing activity.    Patient's goals are to reduce pain.    Patient reports general health as fair.  Related medical history has had ongoing LBP since falling off a ladder 3 years ago. The pain has been present ever since but not as bad as it is now. It would flare up with any physical type work and last for 1 week or so then calm down if he \"takes it easy.\"    Surgical History:  n/a.    Imaging: x-rays.    Medications:  none.       Return to MD:  4 weeks.      Clinical Impression: Patient presents with significant mobility limitations along with increased weakness. Hopefully he will respond favorably to continued PT intervention including manual therapy and therapeutic exercise.    Subjective:    Patient Health History  Aubrey Zavala being seen for Lower back.     Problem began: 2/23/2021.   Problem occurred: Slipped and fell in ice   Pain is reported as 5/10 on pain scale.  General health as reported by patient is fair.  Pertinent medical history includes: high blood pressure and overweight.     Medical allergies: none.       Current medications:  None, high blood pressure " medication and pain medication.    Current occupation is .   Primary job tasks include:  Computer work.                                    Objective:  Standing Alignment:        Lumbar:  Lordosis incr (increased obesity)            Gait:  Slow gait pattern. Mild limp on L  Gait Type:  Antalgic         Flexibility/Screens:   Positive screens:  Lumbar      Spine:  Decreased left spine flexibility:  Quadratus Lumborum    Decreased right spine flexibility:  Quadratus Lumborum             Lumbar/SI Evaluation  ROM:    AROM Lumbar:   Flexion:            Normal  Ext:                    50%   Side Bend:        Left:  Normal    Right:  Normal  Rotation:           Left:  Normal    Right:  50%  Side Glide:        Left:     Right:         Strength: lower abdominals 4-/5; lumbar extensors 4-/5  Lumbar Myotomes:  normal                Lumbar Dermtomes:  normal                Neural Tension/Mobility:    Left side:  SLR positive.  Left side:Slump  negative.   Right side:   SLR positive.  Right side:   Slump  negative.   Lumbar Palpation:  Palpation (lumbar): Diffuse MF tightness/tenderness in B TL/LS musculature.  Tenderness present at Left:    Quadratus Lumborum and Erector Spinae  Tenderness present at Right: Quadratus Lumborum and Erector Spinae      Spinal Segmental Conclusions: Pain with P/A glide T12-L5    Level: Hypo noted at L4 and L5                                                   General     ROS    Assessment/Plan:    Patient is a 56 year old male with lumbar complaints.    Patient has the following significant findings with corresponding treatment plan.                Diagnosis 1:  LBP  Pain -  manual therapy, self management and education  Decreased ROM/flexibility - manual therapy and therapeutic exercise  Decreased strength - therapeutic exercise and therapeutic activities  Impaired muscle performance - neuro re-education  Decreased function - therapeutic activities    Therapy Evaluation Codes:    1) History comprised of:   Personal factors that impact the plan of care:      Time since onset of symptoms.    Comorbidity factors that impact the plan of care are:      Overweight.     Medications impacting care: None.  2) Examination of Body Systems comprised of:   Body structures and functions that impact the plan of care:      Lumbar spine.   Activity limitations that impact the plan of care are:      Standing and Walking.  3) Clinical presentation characteristics are:   Stable/Uncomplicated.  4) Decision-Making    Low complexity using standardized patient assessment instrument and/or measureable assessment of functional outcome.  Cumulative Therapy Evaluation is: Low complexity.    Previous and current functional limitations:  (See Goal Flow Sheet for this information)    Short term and Long term goals: (See Goal Flow Sheet for this information)     Communication ability:  Patient appears to be able to clearly communicate and understand verbal and written communication and follow directions correctly.  Treatment Explanation - The following has been discussed with the patient:   RX ordered/plan of care  Anticipated outcomes  Possible risks and side effects  This patient would benefit from PT intervention to resume normal activities.   Rehab potential is fair.    Frequency:  1 X week, once daily  Duration:  for 6 weeks  Discharge Plan:  Achieve all LTG.  Independent in home treatment program.  Reach maximal therapeutic benefit.    Please refer to the daily flowsheet for treatment today, total treatment time and time spent performing 1:1 timed codes.

## 2021-03-26 NOTE — RESULT ENCOUNTER NOTE
Tobin Burgos,    Thanks for following up, blood pressure looks improved though bottom number is still borderline.  Labs are stable, there is a small amount of protein in your urine which is an early sign of kidney disease.  Keeping your blood pressure controlled and avoiding over the counter NSAID medications like ibuprofen, aleve, and naproxen will help keep kidneys healthy,.  Tylenol is okay to take.    Lets follow up in the office in 3mo to check in on blood pressure. I have sent in refills of the new medication.    Olivia Nunn, CNP

## 2021-04-02 ENCOUNTER — THERAPY VISIT (OUTPATIENT)
Dept: PHYSICAL THERAPY | Facility: CLINIC | Age: 56
End: 2021-04-02
Payer: COMMERCIAL

## 2021-04-02 DIAGNOSIS — G89.29 CHRONIC BILATERAL LOW BACK PAIN WITHOUT SCIATICA: ICD-10-CM

## 2021-04-02 DIAGNOSIS — M54.50 CHRONIC BILATERAL LOW BACK PAIN WITHOUT SCIATICA: ICD-10-CM

## 2021-04-02 PROCEDURE — 97140 MANUAL THERAPY 1/> REGIONS: CPT | Mod: GP | Performed by: PHYSICAL THERAPIST

## 2021-04-02 PROCEDURE — 97110 THERAPEUTIC EXERCISES: CPT | Mod: GP | Performed by: PHYSICAL THERAPIST

## 2021-04-05 DIAGNOSIS — Z83.41 FAMILY HISTORY OF MULTIPLE ENDOCRINE NEOPLASIA TYPE 1 (MEN1): ICD-10-CM

## 2021-04-05 DIAGNOSIS — Z80.9 FAMILY HISTORY OF CANCER: ICD-10-CM

## 2021-04-05 DIAGNOSIS — Z00.00 PREVENTATIVE HEALTH CARE: ICD-10-CM

## 2021-04-07 ENCOUNTER — THERAPY VISIT (OUTPATIENT)
Dept: PHYSICAL THERAPY | Facility: CLINIC | Age: 56
End: 2021-04-07
Payer: COMMERCIAL

## 2021-04-07 DIAGNOSIS — M54.50 CHRONIC BILATERAL LOW BACK PAIN WITHOUT SCIATICA: ICD-10-CM

## 2021-04-07 DIAGNOSIS — G89.29 CHRONIC BILATERAL LOW BACK PAIN WITHOUT SCIATICA: ICD-10-CM

## 2021-04-07 PROCEDURE — 97140 MANUAL THERAPY 1/> REGIONS: CPT | Mod: GP | Performed by: PHYSICAL THERAPIST

## 2021-04-07 PROCEDURE — 97110 THERAPEUTIC EXERCISES: CPT | Mod: GP | Performed by: PHYSICAL THERAPIST

## 2021-04-07 NOTE — PROGRESS NOTES
Subjective:  HPI  Physical Exam                    Objective:  System    Physical Exam    General     ROS    Assessment/Plan:    SUBJECTIVE  Subjective changes as noted by pt:   Pt. is still better than he was initially but he reports not being as good as he was last week.   Current pain level:  5/10   Changes in function:  Yes (See Goal flowsheet attached for changes in current functional level)     Adverse reaction to treatment or activity:  None    OBJECTIVE  Changes in objective findings:  ROM lumbar flex normal; ext 75%.  Tight hip flexors B.     ASSESSMENT  Aubrey continues to require intervention to meet STG and LTG's: PT  Patient is progressing as expected.  Response to therapy has shown an improvement in  pain level and ROM   Progress made towards STG/LTG?  Yes (See Goal flowsheet attached for updates on achievement of STG and LTG)    PLAN  Current treatment program is being advanced to more complex exercises.    PTA/ATC plan:  N/A    Please refer to the daily flowsheet for treatment today, total treatment time and time spent performing 1:1 timed codes.

## 2021-04-11 LAB — MISCELLANEOUS TEST: NORMAL

## 2021-04-13 ENCOUNTER — IMMUNIZATION (OUTPATIENT)
Dept: NURSING | Facility: CLINIC | Age: 56
End: 2021-04-13
Payer: COMMERCIAL

## 2021-04-13 PROCEDURE — 91300 PR COVID VAC PFIZER DIL RECON 30 MCG/0.3 ML IM: CPT

## 2021-04-13 PROCEDURE — 0002A PR COVID VAC PFIZER DIL RECON 30 MCG/0.3 ML IM: CPT

## 2021-04-19 ENCOUNTER — THERAPY VISIT (OUTPATIENT)
Dept: PHYSICAL THERAPY | Facility: CLINIC | Age: 56
End: 2021-04-19
Payer: COMMERCIAL

## 2021-04-19 DIAGNOSIS — M54.50 CHRONIC BILATERAL LOW BACK PAIN WITHOUT SCIATICA: ICD-10-CM

## 2021-04-19 DIAGNOSIS — G89.29 CHRONIC BILATERAL LOW BACK PAIN WITHOUT SCIATICA: ICD-10-CM

## 2021-04-19 PROCEDURE — 97140 MANUAL THERAPY 1/> REGIONS: CPT | Mod: GP | Performed by: PHYSICAL THERAPIST

## 2021-04-19 PROCEDURE — 97530 THERAPEUTIC ACTIVITIES: CPT | Mod: GP | Performed by: PHYSICAL THERAPIST

## 2021-04-19 NOTE — PROGRESS NOTES
Subjective:  HPI  Physical Exam                    Objective:  System    Physical Exam    General     ROS    Assessment/Plan:    PROGRESS  REPORT    Progress reporting period is from 3-25-21 to 4-19-21.       SUBJECTIVE  Subjective changes noted by patient:   Pt. has had increased LBP The past 2 weeks and overall has not made much of any improvement in 4 sessions of PT. He still has pain with walking, lifting, and all transitional movements. At this point he is expressing a desire to pursue an MRI and hold on PT.       Current pain level is  6/10.     Previous pain level was  6/10.   Changes in function:  Yes (See Goal flowsheet attached for changes in current functional level)  Adverse reaction to treatment or activity: None    OBJECTIVE  Changes noted in objective findings:  ROM lumbar flex 75%; ext 75%. Palpation - pain with P/A glide T12-L4     ASSESSMENT/PLAN  Updated problem list and treatment plan: Diagnosis 1:  LBP  Pain -  manual therapy, self management and education  Decreased ROM/flexibility - manual therapy and therapeutic exercise  Decreased strength - therapeutic exercise and therapeutic activities  Impaired muscle performance - neuro re-education  Decreased function - therapeutic activities  STG/LTGs have been met or progress has been made towards goals:  Yes (See Goal flow sheet completed today.)  Assessment of Progress: The patient's condition has exacerbated.  Self Management Plans:  Patient has been instructed in a home treatment program.  Patient  has been instructed in self management of symptoms.  I have re-evaluated this patient and find that the nature, scope, duration and intensity of the therapy is appropriate for the medical condition of the patient.  Aubrey continues to require the following intervention to meet STG and LTG's:  PT intervention is no longer required to meet STG/LTG.    Recommendations:  This patient would benefit from further evaluation.    Please refer to the daily  flowsheet for treatment today, total treatment time and time spent performing 1:1 timed codes.

## 2021-04-20 LAB — LAB SCANNED RESULT: NORMAL

## 2021-04-26 ENCOUNTER — MYC MEDICAL ADVICE (OUTPATIENT)
Dept: FAMILY MEDICINE | Facility: CLINIC | Age: 56
End: 2021-04-26

## 2021-04-27 ENCOUNTER — TELEPHONE (OUTPATIENT)
Dept: NEUROSURGERY | Facility: CLINIC | Age: 56
End: 2021-04-27

## 2021-04-27 DIAGNOSIS — M54.50 CHRONIC BILATERAL LOW BACK PAIN WITHOUT SCIATICA: Primary | ICD-10-CM

## 2021-04-27 DIAGNOSIS — G89.29 CHRONIC BILATERAL LOW BACK PAIN WITHOUT SCIATICA: Primary | ICD-10-CM

## 2021-04-27 RX ORDER — METHYLPREDNISOLONE 4 MG
TABLET, DOSE PACK ORAL
Qty: 21 TABLET | Refills: 0 | Status: SHIPPED | OUTPATIENT
Start: 2021-04-27 | End: 2022-01-13

## 2021-04-27 NOTE — TELEPHONE ENCOUNTER
Patient saw Reno on 3/17/21 and sent for PT.  Patient states that his back is really still hurting and is wondering if he should have an MRI done.  Please call patient to discuss.

## 2021-04-27 NOTE — TELEPHONE ENCOUNTER
Informed patient that Rigoberto Drake PA-C, sent MDP to [facility]. Patient agreeable with plan and will follow up once course completed.

## 2021-04-27 NOTE — TELEPHONE ENCOUNTER
Last Visit: 3/17/21  Next Visit: N/A    Name of Provider: Reno Allison PA-C     Assessment: Patient states that he continues to have lower back pain that radiates to both of his trunk sides. He states this pain intermittently radiates to the middle of his back. He has more significant pain when he's either laying on his back or standing up. He also is unable to lift > 10 lbs or it will aggravate pain. While he is walking he also is able to hear his back cracking while he is carrying things. Patient endorses N/T on his left 3 small toes intermittently which started a few weeks ago. Denies any new incontinence or weakness. After seeing Marycarmenrosalinda on 3/17, PT was recommended. Patient states that after the first week he felt PT was beneficial, but since then, he has plateaued and he is not experiencing the relief he once was. He is taking flexeril as prescribed by PCP every couple of days when needed, aleve, and has been using heat.    Recommendation given: Continue to rotate medications such as tylenol and ice into pain regimen.    Action needed from provider: Please advise.

## 2021-05-10 ENCOUNTER — VIRTUAL VISIT (OUTPATIENT)
Dept: ONCOLOGY | Facility: CLINIC | Age: 56
End: 2021-05-10
Attending: GENETIC COUNSELOR, MS
Payer: COMMERCIAL

## 2021-05-10 DIAGNOSIS — Z83.41 FAMILY HISTORY OF MULTIPLE ENDOCRINE NEOPLASIA TYPE 1 (MEN1): Primary | ICD-10-CM

## 2021-05-10 PROCEDURE — 999N001193 HC VIDEO/TELEPHONE VISIT; NO CHARGE

## 2021-05-10 PROCEDURE — 999N000069 HC STATISTIC GENETIC COUNSELING, < 16 MIN: Mod: GT | Performed by: GENETIC COUNSELOR, MS

## 2021-05-10 NOTE — Clinical Note
Please send copy of letter to patient with test results. Please enclose test results: Send outs misc test [[id number]] (Order [id number])

## 2021-05-10 NOTE — LETTER
Cancer Risk Management  Program [facility]  [facility]  [facility]  [facility]  Mailing Address  Cancer Risk Management Program  [facility]  [address]  [place] [address]    New patient appointments  [phone]  May 29, 2021    Aubrey Zavala  [address]  [place] [address]      Dear Aubrey,    It was a pleasure speaking with you over video for genetic couseling on 5/10/2021. Here is a copy of the progress note from our discussion. If you have any additional questions, please feel free to call.    Referring Provider: MAGED Diaz CNP    Presenting Information:  I spoke to Aubrey over video today to discuss his genetic testing results. His blood was drawn on 4/5/21. A CustomNext-Cancer panel (a combination of the CancerNext panel plus MEN1) was ordered from [facility]. This testing was done because of Aubrey's family history of Multiple Endocrine Neoplasia Type I and cancer.    Genetic Testing Result: NEGATIVE  Aubrey is negative for mutations in the 37 genes analyzed: APC, RADHA, AXIN2, BARD1, BMPR1A, BRCA1, BRCA2, BRIP1, CDH1, CDK4, CDKN2A, CHEK2, DICER1, MEN1, MLH1, MSH2, MSH3, MSH6, MUTYH, NBN, NF1, NTHL1, PALB2, PMS2, PTEN, RAD51C, RAD51D, RECQL, SMAD4, SMARCA4, STK11 and TP53 (sequencing and deletion/duplication); HOXB13, POLD1 and POLE (sequencing only); EPCAM and GREM1 (deletion/duplication only).    The MEN1 mutation previously detected in his sister was not detected in him.      Interpretation:  We discussed several different interpretations of this negative test result.    1. One explanation may be that there is a different gene or combination of genes and environment that are associated with the cancers in this family.  2. It is possible that the cancers/tumors in his family are explained  by the MEN1 mutation that has reportedly been detected in multiple of his relatives.  3. There is also a small possibility that there is a mutation in one of these genes, and the testing laboratory could not find it with their current testing methods.       Screening:  Based on this negative test result, it is important for Aubrey and his relatives to refer back to the family history for appropriate cancer screening.      He should continue with population cancer screening options, such as those recommended by the American Cancer Society and the National Comprehensive Cancer Network (NCCN). These screening recommendations may change if there are changes to Aubrey's personal and/or family history of cancer. Final screening recommendations should be made by each individual's managing physician.    Inheritance:  We reviewed the autosomal dominant inheritance of mutations in these genes. Mutations in these genes do not skip generations.      Additional Testing Considerations:  Although Aubrey's genetic testing result was negative, other relatives may still carry a gene mutation associated with an increased risk for cancer. Genetic counseling is recommended for his paternal relatives who have not yet had testing for the familial MEN1 mutation to discuss genetic testing options. If any of these relatives do pursue genetic testing, Aubrey is encouraged to contact me so that we may review the impact of their test results on him.    Summary:  While no genetic changes were identified, Aubrey may still be at risk for certain cancers due to family history, environmental factors, or other genetic causes not identified by this test. Because of that, it is important that he continue with cancer screening based on his personal and family history as discussed above.    Genetic testing is rapidly advancing, and new cancer susceptibility genes will most likely be identified in the future. Therefore, I encouraged Aubrey to contact me  annually or if there are changes in his personal or family history. This may change how we assess his cancer risk, screening, and the testing we would offer.    Plan:  1. A copy of the test results will be mailed to Aubrey.  2. He plans to follow-up with his physicians.  3. He should contact me annually, or sooner if his family history changes.    If Aubrey has any further questions, I encouraged him to contact me at [phone].    Harleen Canas MS, [facility]  Licensed, Certified Genetic Counselor  Office: [phone]  Email: [email or web address]

## 2021-05-10 NOTE — PROGRESS NOTES
"5/10/2021    Aubrey is a 56 year old who is being evaluated via a billable video visit.      Video-Visit Details    Type of service: Video Visit    Video Start Time: 03:31 pm  Video End Time: 03:37 pm    Originating Location (pt. Location): Home    Distant Location (provider location): Cancer Risk Management Program    Platform used for Video Visit: Mark    Referring Provider: MAGED Diaz CNP    Presenting Information:  I spoke to Aubrey over video today to discuss his genetic testing results. His blood was drawn on 4/5/21. A CustomNext-Cancer panel (a combination of the CancerNext panel plus MEN1) was ordered from [facility]. This testing was done because of Aubrey's family history of Multiple Endocrine Neoplasia Type I and cancer.    Genetic Testing Result: NEGATIVE  Aubrey is negative for mutations in the 37 genes analyzed: APC, RADHA, AXIN2, BARD1, BMPR1A, BRCA1, BRCA2, BRIP1, CDH1, CDK4, CDKN2A, CHEK2, DICER1, MEN1, MLH1, MSH2, MSH3, MSH6, MUTYH, NBN, NF1, NTHL1, PALB2, PMS2, PTEN, RAD51C, RAD51D, RECQL, SMAD4, SMARCA4, STK11 and TP53 (sequencing and deletion/duplication); HOXB13, POLD1 and POLE (sequencing only); EPCAM and GREM1 (deletion/duplication only).    The MEN1 mutation previously detected in his sister was not detected in him.      A copy of the test report can be found in the Laboratory tab, dated 4/5/21, and named \"SEND OUTS MISC TEST\". The report is scanned in as a linked document.    Interpretation:  We discussed several different interpretations of this negative test result.    1. One explanation may be that there is a different gene or combination of genes and environment that are associated with the cancers in this family.  2. It is possible that the cancers/tumors in his family are explained by the MEN1 mutation that has reportedly been detected in multiple of his relatives.  3. There is also a small possibility that there is a mutation in one of these genes, and the " testing laboratory could not find it with their current testing methods.       Screening:  Based on this negative test result, it is important for Aubrey and his relatives to refer back to the family history for appropriate cancer screening.      He should continue with population cancer screening options, such as those recommended by the American Cancer Society and the National Comprehensive Cancer Network (NCCN). These screening recommendations may change if there are changes to Aubrey's personal and/or family history of cancer. Final screening recommendations should be made by each individual's managing physician.    Inheritance:  We reviewed the autosomal dominant inheritance of mutations in these genes. Mutations in these genes do not skip generations.      Additional Testing Considerations:  Although Aubrey's genetic testing result was negative, other relatives may still carry a gene mutation associated with an increased risk for cancer. Genetic counseling is recommended for his paternal relatives who have not yet had testing for the familial MEN1 mutation to discuss genetic testing options. If any of these relatives do pursue genetic testing, Aubrey is encouraged to contact me so that we may review the impact of their test results on him.    Summary:  While no genetic changes were identified, Aubrey may still be at risk for certain cancers due to family history, environmental factors, or other genetic causes not identified by this test. Because of that, it is important that he continue with cancer screening based on his personal and family history as discussed above.    Genetic testing is rapidly advancing, and new cancer susceptibility genes will most likely be identified in the future. Therefore, I encouraged Aubrey to contact me annually or if there are changes in his personal or family history. This may change how we assess his cancer risk, screening, and the testing we would offer.    Plan:  1. A copy of  the test results will be mailed to Aubrey.  2. He plans to follow-up with his physicians.  3. He should contact me annually, or sooner if his family history changes.    If Aubrey has any further questions, I encouraged him to contact me at [phone].    Time spent over video: 6 minutes.    Harleen Canas MS, [facility]  Licensed, Certified Genetic Counselor  Office: [phone]  Email: [email or web address]

## 2021-05-25 ENCOUNTER — OFFICE VISIT (OUTPATIENT)
Dept: NEUROSURGERY | Facility: CLINIC | Age: 56
End: 2021-05-25
Attending: PHYSICIAN ASSISTANT
Payer: COMMERCIAL

## 2021-05-25 VITALS — DIASTOLIC BLOOD PRESSURE: 80 MMHG | OXYGEN SATURATION: 97 % | HEART RATE: 108 BPM | SYSTOLIC BLOOD PRESSURE: 112 MMHG

## 2021-05-25 DIAGNOSIS — M54.50 ACUTE MIDLINE LOW BACK PAIN WITHOUT SCIATICA: Primary | ICD-10-CM

## 2021-05-25 PROCEDURE — G0463 HOSPITAL OUTPT CLINIC VISIT: HCPCS

## 2021-05-25 PROCEDURE — 99214 OFFICE O/P EST MOD 30 MIN: CPT | Performed by: PHYSICIAN ASSISTANT

## 2021-05-25 NOTE — LETTER
5/25/2021         RE: Aubrey Zavala  [address]  [facility] [address]        Dear Colleague,    Thank you for referring your patient, Aubrey Zavala, to the [facility] [place]. Please see a copy of my visit note below.    Neurosurgery follow-up    Mr. Zavala returns to clinic today for follow-up.  He continues to have left-sided low back pain.  He has tried physical therapy with only mild improvement but now he is beginning to worsen again.  He was given a Medrol Dosepak by one of my other colleagues which helped for a day or 2 but then wore off.  He reports some numbness and tingling in his left foot in his fifth fourth and third digits.  This is intermittent.  He denies radicular pain down his leg denies bowel or bladder symptoms or saddle anesthesia.  He is interested in obtaining a lumbar MRI to rule out any further issues in his low back.    Exam    B/P: 112/80, T: Data Unavailable, P: 108, R: Data Unavailable     Alert and oriented no acute distress  Gait is normal  Significantly limited range of motion in the hips bilaterally with some pain in the right hip with attempts at internal and external rotation.    Able stand on heels and toes    Imaging    Lumbar x-ray from 3/17/2021 demonstrated no compression fractures, slight anterolisthesis of L 4 on L5.    Assessment    Low back pain      Plan    We will obtain a lumbar MRI without contrast and I will contact the patient with further recommendations after I reviewed the results.      Total time of 30 minutes spent with the patient today in counseling and coordination of care.        Again, thank you for allowing me to participate in the care of your patient.        Sincerely,        Reno Allison PA-C

## 2021-05-25 NOTE — PROGRESS NOTES
Neurosurgery follow-up    Mr. Zavala returns to clinic today for follow-up.  He continues to have left-sided low back pain.  He has tried physical therapy with only mild improvement but now he is beginning to worsen again.  He was given a Medrol Dosepak by one of my other colleagues which helped for a day or 2 but then wore off.  He reports some numbness and tingling in his left foot in his fifth fourth and third digits.  This is intermittent.  He denies radicular pain down his leg denies bowel or bladder symptoms or saddle anesthesia.  He is interested in obtaining a lumbar MRI to rule out any further issues in his low back.    Exam    B/P: 112/80, T: Data Unavailable, P: 108, R: Data Unavailable     Alert and oriented no acute distress  Gait is normal  Significantly limited range of motion in the hips bilaterally with some pain in the right hip with attempts at internal and external rotation.    Able stand on heels and toes    Imaging    Lumbar x-ray from 3/17/2021 demonstrated no compression fractures, slight anterolisthesis of L 4 on L5.    Assessment    Low back pain      Plan    We will obtain a lumbar MRI without contrast and I will contact the patient with further recommendations after I reviewed the results.      Total time of 30 minutes spent with the patient today in counseling and coordination of care.

## 2021-06-08 ENCOUNTER — HOSPITAL ENCOUNTER (OUTPATIENT)
Dept: MRI IMAGING | Facility: CLINIC | Age: 56
End: 2021-06-08
Attending: PHYSICIAN ASSISTANT
Payer: COMMERCIAL

## 2021-06-08 ENCOUNTER — HOSPITAL ENCOUNTER (OUTPATIENT)
Dept: GENERAL RADIOLOGY | Facility: CLINIC | Age: 56
Setting detail: OBSERVATION
End: 2021-06-08
Attending: PHYSICIAN ASSISTANT
Payer: COMMERCIAL

## 2021-06-08 DIAGNOSIS — M54.50 ACUTE MIDLINE LOW BACK PAIN WITHOUT SCIATICA: ICD-10-CM

## 2021-06-08 DIAGNOSIS — Z01.89 ENCOUNTER FOR IMAGING TO SCREEN FOR METAL PRIOR TO MRI: ICD-10-CM

## 2021-06-08 PROCEDURE — 72148 MRI LUMBAR SPINE W/O DYE: CPT

## 2021-06-08 PROCEDURE — 73590 X-RAY EXAM OF LOWER LEG: CPT | Mod: RT

## 2021-06-11 ENCOUNTER — TELEPHONE (OUTPATIENT)
Dept: NEUROSURGERY | Facility: CLINIC | Age: 56
End: 2021-06-11

## 2021-06-11 NOTE — TELEPHONE ENCOUNTER
Attempted to reach out to patient to discuss his appointment, no answer. Left voice message for patient to call clinic back to further discuss. Per Reno he should be seen on a day when  is in clinic as well, earliest date was 6/28. Reno also states he can be seen by just Merle.

## 2021-06-21 ENCOUNTER — OFFICE VISIT (OUTPATIENT)
Dept: NEUROSURGERY | Facility: CLINIC | Age: 56
End: 2021-06-21
Attending: NEUROLOGICAL SURGERY
Payer: COMMERCIAL

## 2021-06-21 VITALS — HEART RATE: 105 BPM | DIASTOLIC BLOOD PRESSURE: 84 MMHG | SYSTOLIC BLOOD PRESSURE: 138 MMHG | OXYGEN SATURATION: 96 %

## 2021-06-21 DIAGNOSIS — M54.50 ACUTE BILATERAL LOW BACK PAIN WITHOUT SCIATICA: Primary | ICD-10-CM

## 2021-06-21 PROCEDURE — G0463 HOSPITAL OUTPT CLINIC VISIT: HCPCS | Performed by: SPECIALIST/TECHNOLOGIST

## 2021-06-21 PROCEDURE — 99214 OFFICE O/P EST MOD 30 MIN: CPT | Performed by: NEUROLOGICAL SURGERY

## 2021-06-21 RX ORDER — NAPROXEN SODIUM 220 MG
220 TABLET ORAL 2 TIMES DAILY WITH MEALS
COMMUNITY
End: 2022-01-13

## 2021-06-21 ASSESSMENT — PAIN SCALES - GENERAL: PAINLEVEL: EXTREME PAIN (8)

## 2021-06-21 NOTE — NURSING NOTE
"Aubrey Zavala is a 56 year old male who presents for:  Chief Complaint   Patient presents with     Results     MRI f/u from 6/8        Vitals:    Vitals:    06/21/21 1540   BP: 138/84   BP Location: Right arm   Patient Position: Sitting   Cuff Size: Adult Regular   Pulse: 105   SpO2: 96%       BMI:  Estimated body mass index is 40.6 kg/m  as calculated from the following:    Height as of 3/17/21: 5' 10.5\" (1.791 m).    Weight as of 3/17/21: 287 lb (130.2 kg).    Pain Score:  Extreme Pain (8)        Aung Warner, ATC      "

## 2021-06-21 NOTE — LETTER
6/21/2021         RE: Aubrey Zavala  [address]  [place] [address]        Dear Colleague,    Thank you for referring your patient, Aubrey Zavala, to the [facility] [place]. Please see a copy of my visit note below.    Mr. Zavala is evaluated for a 2-1/2-year complaint of left-sided intractable low back pain which reportedly began after he fell on the ice.  He has essentially no symptoms consistent with lumbar radiculopathy or neurogenic claudication.  He notes an increase in activity, prolonged standing or walking tends to exacerbate his problem.  He is accompanied to the office visit today by his wife who also reports that this is quite functionally limiting for him.  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  He experiences pain upon changing position and has some difficulty with sleep as well.  He has no similar symptoms toward the right side.  He notes that the onset of the symptoms may have been coincident with episodes of continual clicking and popping in his lower back.  He has undergone a program of physical therapy which initially provided some partial temporary improvement in his symptoms.  However, this effect was not long-lasting and he does not believe he has substantially benefited from exercise therapy.  He has no history of invasive procedures to his lumbar spine.    On examination, he is able to change position from seated to standing with minimal pain behavior.  Gait is normal without antalgic component.  Motor examination of his bilateral lower extremity shows 5/5 strength throughout including plantar and dorsiflexion at the ankles while weightbearing.  Proximal bilateral lower extremity strength appears to be intact.  Straight leg raising is negative for radicular symptoms but does reproduce a complaint of pain near the left greater trochanter which is not a prominent part of his current complaint.  Deep  tendon reflex testing is deferred.  There are no long tract findings noted.  Patient is noted to be morbidly obese.    Review of his recent lumbar MRI scan shows multilevel lumbar degenerative changes most pronounced at the L4-5 level where he has substantial facet and ligamentous hypertrophy producing mild to moderate spinal canal stenosis.  He is also noted to have foraminal stenosis bilaterally at L4-5 and L5-S1 as well.  Spinal alignment is otherwise satisfactory.  There is not marked degenerative change in the lumbar disks at any level.  There is no evidence of fracture or other significant vertebral or paraspinal pathology.    Assessment: Largely isolated chronic and intense left paraspinal lumbar pain consistent with facet agenic symptoms based on history and examination as well as radiographic findings.  I have spoke with him at length regarding the differential diagnosis for mechanical low back pain as well as means of management.  I see no indication for surgical intervention and believe that Mr. Zavala would be a particularly good candidate for lumbar decompression or fusion surgery.  I have recommended a medial branch block possibly as part of an evaluation for facet rhizotomies.  Patient and his wife question me regarding the possibility of an epidural steroid injection but I would defer that intervention given the near complete lack of leg symptoms.    I also discussed weight reduction and continued participation in home exercise program as a means of managing this problem.  I have asked him to call or return to the office if his symptoms change significantly in severity or character or if medial branch blocks and potentially a facet rhizotomy are not helpful in reducing his current symptoms.    Approximately 35 minutes was spent in direct contact with the patient the majority reviewing the clinical and radiographic findings, management alternatives, risks and benefits.      Again, thank you for  allowing me to participate in the care of your patient.        Sincerely,        Tadeo Bloom MD

## 2021-06-21 NOTE — PROGRESS NOTES
Mr. Zavala is evaluated for a 2-1/2-year complaint of left-sided intractable low back pain which reportedly began after he fell on the ice.  He has essentially no symptoms consistent with lumbar radiculopathy or neurogenic claudication.  He notes an increase in activity, prolonged standing or walking tends to exacerbate his problem.  He is accompanied to the office visit today by his wife who also reports that this is quite functionally limiting for him.  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  He experiences pain upon changing position and has some difficulty with sleep as well.  He has no similar symptoms toward the right side.  He notes that the onset of the symptoms may have been coincident with episodes of continual clicking and popping in his lower back.  He has undergone a program of physical therapy which initially provided some partial temporary improvement in his symptoms.  However, this effect was not long-lasting and he does not believe he has substantially benefited from exercise therapy.  He has no history of invasive procedures to his lumbar spine.    On examination, he is able to change position from seated to standing with minimal pain behavior.  Gait is normal without antalgic component.  Motor examination of his bilateral lower extremity shows 5/5 strength throughout including plantar and dorsiflexion at the ankles while weightbearing.  Proximal bilateral lower extremity strength appears to be intact.  Straight leg raising is negative for radicular symptoms but does reproduce a complaint of pain near the left greater trochanter which is not a prominent part of his current complaint.  Deep tendon reflex testing is deferred.  There are no long tract findings noted.  Patient is noted to be morbidly obese.    Review of his recent lumbar MRI scan shows multilevel lumbar degenerative changes most pronounced at the L4-5 level where he has substantial facet  and ligamentous hypertrophy producing mild to moderate spinal canal stenosis.  He is also noted to have foraminal stenosis bilaterally at L4-5 and L5-S1 as well.  Spinal alignment is otherwise satisfactory.  There is not marked degenerative change in the lumbar disks at any level.  There is no evidence of fracture or other significant vertebral or paraspinal pathology.    Assessment: Largely isolated chronic and intense left paraspinal lumbar pain consistent with facet agenic symptoms based on history and examination as well as radiographic findings.  I have spoke with him at length regarding the differential diagnosis for mechanical low back pain as well as means of management.  I see no indication for surgical intervention and believe that Mr. Zavala would be a particularly good candidate for lumbar decompression or fusion surgery.  I have recommended a medial branch block possibly as part of an evaluation for facet rhizotomies.  Patient and his wife question me regarding the possibility of an epidural steroid injection but I would defer that intervention given the near complete lack of leg symptoms.    I also discussed weight reduction and continued participation in home exercise program as a means of managing this problem.  I have asked him to call or return to the office if his symptoms change significantly in severity or character or if medial branch blocks and potentially a facet rhizotomy are not helpful in reducing his current symptoms.    Approximately 35 minutes was spent in direct contact with the patient the majority reviewing the clinical and radiographic findings, management alternatives, risks and benefits.

## 2021-06-28 ENCOUNTER — MYC MEDICAL ADVICE (OUTPATIENT)
Dept: FAMILY MEDICINE | Facility: CLINIC | Age: 56
End: 2021-06-28

## 2021-06-28 NOTE — TELEPHONE ENCOUNTER
Writer responded via [employer].    PEEWEE JoshiN, RN  [facility]

## 2021-06-30 ENCOUNTER — TELEPHONE (OUTPATIENT)
Dept: NEUROSURGERY | Facility: CLINIC | Age: 56
End: 2021-06-30

## 2021-06-30 NOTE — TELEPHONE ENCOUNTER
LVM for pt to discuss appt with Dr. Kelly for further evaluation and possible medial branch block procedure per Dr. Bloom's note from 6/21/21 and per Reno Allison. Pt to CB and discuss and appt can be scheduled at [facility].       Catrachita Coleman, RNCC  Neurology

## 2021-06-30 NOTE — TELEPHONE ENCOUNTER
spoike to patient regarding message from Reno Allison, that patient needs another evaluation before being referred for an actual injection procedure.  Patient stated he saw Dr. Bloom on 6-21-21 , and thought that was the evaluation needed to receive the injection.     Patient would like a response asap.  A staff message was returned to Reno as well concerning the situation.

## 2021-07-02 NOTE — TELEPHONE ENCOUNTER
Pt agreed to see Dr. Kelly for further evaluation to discuss possible medical branch blocks. Scheduled on 7/23/21 at 0800. Pt advised to call back for any further questions.       Catrachita Coleman, RNCC  Neurology

## 2021-07-23 ENCOUNTER — OFFICE VISIT (OUTPATIENT)
Dept: NEUROSURGERY | Facility: CLINIC | Age: 56
End: 2021-07-23
Payer: COMMERCIAL

## 2021-07-23 VITALS
SYSTOLIC BLOOD PRESSURE: 115 MMHG | HEART RATE: 78 BPM | DIASTOLIC BLOOD PRESSURE: 75 MMHG | BODY MASS INDEX: 41.87 KG/M2 | WEIGHT: 296 LBS

## 2021-07-23 DIAGNOSIS — M53.3 SACROILIAC JOINT PAIN: Primary | ICD-10-CM

## 2021-07-23 DIAGNOSIS — Z11.59 ENCOUNTER FOR SCREENING FOR OTHER VIRAL DISEASES: ICD-10-CM

## 2021-07-23 DIAGNOSIS — M47.816 LUMBAR FACET ARTHROPATHY: ICD-10-CM

## 2021-07-23 DIAGNOSIS — M51.369 DDD (DEGENERATIVE DISC DISEASE), LUMBAR: ICD-10-CM

## 2021-07-23 DIAGNOSIS — M47.816 LUMBAR SPONDYLOSIS: ICD-10-CM

## 2021-07-23 PROCEDURE — 99205 OFFICE O/P NEW HI 60 MIN: CPT | Performed by: PHYSICAL MEDICINE & REHABILITATION

## 2021-07-23 ASSESSMENT — PAIN SCALES - GENERAL: PAINLEVEL: SEVERE PAIN (7)

## 2021-07-23 NOTE — PATIENT INSTRUCTIONS
Preparing for Spine Injection Therapy              Why Do I Need Spine Injection Therapy?  Your Health Care Provider is recommending spine injection therapy to  help relieve your back and neck pain. This will be in addition to other therapies such as medications and physical therapy. The purpose of these injections is to reduce the amount of inflammation (swelling, pain, heat, redness, loss of body function) around the nerves thus reducing the amount of pain.     The medications you will receive with the injections will include:     Anesthetic - medication to numb the painful area.      Steroid - medication that prevents or reduces swelling and pain (anti-inflammatory).   To reduce your discomfort during the injection procedure, you will receive a numbing medication injection prior to the placement of the needles. You will be lying on your stomach during the injection procedure. We will use a low-dose x-ray (fluoroscopy) to help guide needle placement.  You must have a  for this procedure. We will need to reschedule your injection if you do not have a  with you.      What are the different types of injections and procedure?  Below, is a brief description of the different types of injections we use to deliver pain medication as close as possible to the nerves in the painful area:      Epidural injection: (picture on the right) Epidural injections place 2 medications in your epidural space.  This is the space alongside your spinal canal (not inside of it). Nerves from your spinal cord pass through this space. The medications will bathe those nerves.         Facet joint injection: These injections place 2 medications into the joints of your neck or spine.     SI joint injection: (picture on the left) deliver pain medications into the Sacroiliac joint that connects the hip bones.     Hip joint injection: deliver pain medication to the joint that connect your hip and femur bones (the femur is the bone in  the center of the leg that extends from the knee with the hip).  You will be lying on your side with your affected side up. For example, if we are injecting your left hip, then you will be lying on your right side.     Medial Branch Block injections: This is a test to see if your pain is      coming from a specific nerve. This injection is similar to a facet joint injection but contains only the numbing medication.  You will keep a pain score diary for the rest of the day and the following morning after receiving the injection.      Radiofrequency ablation: This is a procedure that uses radio waves and numbing medication to block the nerves that feel pain at the joint. The pain relief effects can last for a long time, but are not permanent. The procedure is similar to the Medial Branch Block but requires additional testing to ensure that the needle is near the nerve before numbing and ablating it.  Doctors often order sedation for this procedure.  Please see the sections on sedation below.      What Should I Expect if I Receive Sedation? THIS IS ORDERED BY THE PHYSICIAN  This is conscious sedation.  The medications we give to you will help you relax and reduce your anxiety.  You will still be awake for the procedure so we can ask you questions and hear your answers.     What Are My Responsibilities With Sedation?    You must stop eating and drinking 8 (eight) hours before your procedure. You can have clear fluids (water, coffee/tea without milk) up to 2 hours prior to the injections. Nothing by mouth for 2 hours prior to injection.  This includes gum, mints, or chew.  Take your morning medications with a small sip of water.      You must have a  who will check-in with you, and stay in the building while the procedure is underway.  If you do not have a  your sedation will be cancelled.  We will monitor you for at least 30 minutes after the procedure before being discharged home.      What Are the Risks  and Complications For This Procedure?  Risks and complication are rare, but can still occur.  You should understand, discuss, and accept these risks before agreeing to the procedure. They include, but are not limited to:    infection    nerve damage     paralysis    injection failure or a need for further injections or additional procedures    continued or worsening of symptoms/pain,     medication reaction,    dural leak (into the hole covering around the spinal cord. This may cause a a spinal headache)    leak of the medication into the spinal canal, nerves, or blood vessel.    death       What do I need to do before the procedure?   If you do not follow these instructions your procedure may be cancelled.    Tell us if you are on major blood thinners such as Coumadin, Xarelto , Plavix, Eliquis , Pradaxa , or others.    o Contact the doctor who prescribed your blood thinner to ask for permission to stop taking it before you have the injection.    o Schedule your pain injection procedure after your doctor gave their permission.   o We will notify you when to stop and re-start your blood thinner.    Tell us if you have any allergies to contrast dye.  If you do, we may give additional medications to take before the procedure.    Tell us if you are pregnant, or possibly pregnant.  If so, you cannot receive steroid medications or be exposed to fluoroscopic X-rays.    Tell us if you have been sick during the 10 days before the procedure. This includes:   o colds   o gastrointestinal illness or discomfort  o dental sores,   o skin infection, or any other type of infection.    Tell us if you have taken antibiotics during the 10 days prior to the procedure.    Do not drink alcohol the night before or on the day of the procedure.    You must shower the night before and on the day of your procedure.    Wear comfortable, clean clothing.    If you have an outside MRI (Magnetic Resonance Imaging photo), please bring it with  you.    What Will Happen After the Procedure?    If you did not receive sedation we will monitor you for 15 minutes after the procedure. If you received sedation, we will monitor you for at least 30 minutes after the procedure     How soon can I expect pain relief?    You have received 2 types of medications with your injection:   o  Anesthetic - numbing medication which only acts for a few hours   o  Steroid which may take 3-14 days to be effective.     You can expect to feel your normal pain after the anesthetic wears off, until the steroid becomes effective.    How should I care for myself at home?    Get plenty of rest and avoid twisting, bending movements, heavy lifting, or strenuous activity for the first 24 hours. This will help the steroid be more effective. Medial Branch Block injections will have different discharge instructions.  Those will be discussed at that injection appointment.    Resume your pain medications    Apply ice packs (on for 20 minutes at a time), every 2-3 hours to your injection area for the first 2-3 days to help with pain control.      Avoid heat (pads or water bottles), which can cause the veins to open up, making the steroid less effective. You can use heat after 48 hours.    Take showers only for the first 48 hours.  No baths, hot tubs, swimming, or soaking for 48 hours to reduce the risk of infection.     When should I call the doctor?  Call us if you have any of the following:     Fever more than 100.5 degrees Fahrenheit     Signs of infection     Severe headache     Severe back pain     Increased numbness or weakness in your legs or arms     Loss of bladder or bowel control    Nausea     Other concerns    What is the contact information?    During business hours Monday-Friday 8a-5p call [phone].     After business hours, on weekends and holidays call [phone] and ask for the PM&R doctor on call        Patient Education     Medial Branch Neurotomy  Back or neck pain  may be due to problems with certain nerves near your spine. If so, a medial branch neurotomy can help relieve your pain. Neurotomy destroys a nerve to relieve pain or stop involuntary movements. In some cases, your doctor may give you a nerve block to see how your body will respond to neurotomy. The treatment uses heat, cold, radiofrequency, or chemicals to destroy the nerves near a problem joint. This keeps some pain messages from traveling to your brain, and helps relieve your symptoms.   Medial branch nerves  Each vertebra in your spine has facets (flat surfaces). They touch where the vertebrae fit together. This forms a facet joint. Each facet joint has at least 2 medial branch nerves. They are part of the nerve pathway to and from each facet joint. A facet joint in your back or neck can become inflamed (swollen and irritated). Pain messages may then travel along the nerve pathway from the facet joint to your brain.     Blocking pain messages  Medial branch nerves in each facet joint send and carry messages about back or neck pain. Destroying a few of these nerves can keep certain pain messages from reaching your brain. This can help bring you relief. The relief typically lasts for months to years.   Risks and complications  Risks and complications are rare, but can include:    Infection    Increased pain, numbness, or weakness    Nerve damage    Bleeding    Failure to relieve pain  [employer] last reviewed this educational content on 4/1/2018 2000-2021 [employer]. All rights reserved. This information is not intended as a substitute for professional medical care. Always follow your healthcare professional's instructions.              Patient Education     Medial Branch Neurotomy: Your Experience  Back or neck pain may be due to problems with certain nerves near your spine. A medial branch neurotomy may help relieve your pain. Neurotomy is a procedure to destroy a nerve. This is done to relieve  pain or stop movements you can't control. The treatment uses heat, cold, radiofrequency, or chemicals. This destroys the nerves near a problem joint. This keeps some pain messages from traveling to your brain. It can help relieve your symptoms.   Before you agree to this procedure, make sure to ask the healthcare provider:     Why do I need this procedure?    Are there any alternatives?    How many times have you done this procedure?    What are the risks?    When will I see the results?    Will the medicines in the injection interact with other medicines I'm taking?  If you don't feel OK asking these questions, ask a family member or friend to ask them. The answers are vital to your health and safety.   The treatment is done in a hospital or outpatient clinic. Your healthcare provider will ask you to sign a consent form. He or she will examine you and may give you an IV (intravenous) line for fluids and medicines.      Relax at home for the rest of the day after your treatment, even if you feel good.   Getting ready for your treatment  Ask your healthcare provider if you should stop taking any medicines before treatment. Follow directions for not eating or drinking before treatment.   Tell your healthcare provider:    If you are pregnant or could be    If you are allergic to any medicines    If you have had any recent illness    If you have other recent changes in your health  During the procedure  You will lie on an exam table. You will most likely on your stomach. This depends on where the nerve is that needs to be treated.     Your healthcare provider will clean the skin over the treatment site. He or she will then numb it with medicine.    Your provider uses X-ray imaging (fluoroscopy) to help see your spine. The X-rays help guide the treatment. Your provider may inject a contrast dye into the affected area. This is to help get a better image. If you are allergic to iodine or had a reaction to a dye, tell your  healthcare provider.    Your healthcare provider uses heat, cold, or chemicals to destroy part of the nerve near the inflamed facet joint. Nearby nerves may also be treated.  After the procedure  You can likely go home about 1 hour after the procedure. Have a family member or friend drive you. The treated spot may be swollen and may feel more sore than usual. This is normal. This may last for a day or so. It will be a few days before you feel relief from your symptoms. Your provider may prescribe pain medicines for you. Ask him or her when it s OK for you to go back to work.   When to call your healthcare provider  Call your healthcare provider if you have:    Fever of 100.4 F ( 38 C) or higher, or as advised by your healthcare provider    Chills    Redness or fluid leaking from the treatment area  [employer] last reviewed this educational content on 4/1/2018 2000-2021 [employer]. All rights reserved. This information is not intended as a substitute for professional medical care. Always follow your healthcare professional's instructions.

## 2021-07-23 NOTE — LETTER
7/23/2021         RE: Aubrey Zavala  [address]  [facility] [address]        Dear Colleague,    Thank you for referring your patient, Aubrey Zavala, to the [facility] [place]. Please see a copy of my visit note below.    Patient seen at the request of Dr. Bloom for an opinion and evaluation of medial branch blocks and radiofrequency ablation.      HISTORY OF PRESENT ILLNESS:  Aubrey Zavala is a 56 year old male who presents with a chief complaint of chronic bilateral low back pain. He is accompanied today by his wife.      Pain began several years ago and is associated with trauma. Patient fell off of a ladder at home and went to an ED and reports no acute issues. In February he fell on ice on his side -- this pain is new and different, particularly more intense than prior. He reports progressive worsening since February and worse throughout the day. Initial management has included several sessions of PT but stopped due to increased pain; he does continue some home exercises as he is able to tolerate. He did have an MRI Lumbar spine  Most recently, he has been seen and evaluated by neurosurgery, who did not     The pain is localized to the lumbosacral region R=L (50:50%); he denies numbness/tingling; described as sharp in nature. Pain is reported as 7/10 at rest today and up to 9/10 at worst in the last week. Symptoms are worse with generalized movements; and improved with relative rest. He does report some pain-related sleep disturbance.     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       PRIOR INJURIES/TREATMENT:   Physical Therapy:   PT helped initially but unable to continue to due increase in his pain and symptoms.      - Current Pain Medications -   Aleeve 220mg BID  Flexeril 5-10mg TID     - Prior/Trialed Pain Medications -   Medrol dosepak - did not help significantly     Prior Procedures:  Date       Procedure     Improvement (%)  None         Prior Related Surgery: None   Other (acupuncture, OMT, CMM, TENS, DME, etc.):   None     Specialists Seen - (with most recent, available notes and clinic visits reviewed)   1. Neurosurgery - Dr. Abbasi      IMAGING - reviewed   06/08/2021 MR L spine  FINDINGS: There are five lumbar-type vertebrae for the purposes of  this dictation.      There is normal alignment of the lumbar vertebrae. Vertebral body  heights of the lumbar spine are normal. Marrow signal throughout the  lumbar vertebrae is within normal limits. There is no evidence for  fracture or pathologic bony lesion of the lumbar spine.      There is loss of disc height, disc desiccation and posterior disc  bulging/herniation to varying degrees at all levels of the lumbar  spine with the exception of the normal-appearing L5-S1 disc.      The tip of the conus medullaris is at the upper L2 level which is  within normal limits. There is no evidence for intrathecal  abnormality.      Level by level:    T12-L1: Loss of disc height, disc desiccation and mild circumferential  disc bulging. No herniation. Mild facet arthropathy bilaterally. No  spinal canal stenosis. No foraminal stenosis on either side.  L1-L2: Loss of disc height, disc desiccation and mild circumferential  disc bulging. No herniation. Moderate facet arthropathy bilaterally.  No spinal canal stenosis. No foraminal stenosis on either side.  L2-L3: Loss of disc height, disc desiccation and mild circumferential  disc bulging. No herniation. Moderate facet arthropathy bilaterally.  No spinal canal stenosis. No foraminal stenosis on either side.  L3-L4: Loss of disc height, disc desiccation and mild circumferential  disc bulging. No herniation. Moderate facet arthropathy bilaterally.  No spinal canal stenosis. Mild bilateral neural foraminal narrowing.  L4-L5: Loss of disc height, disc desiccation and mild circumferential  disc bulging. No herniation.  Severe facet arthropathy bilaterally.  Ligamentum flavum thickening bilaterally. Moderate spinal canal  stenosis. Moderate left foraminal stenosis. Moderate right foraminal  stenosis.  L5-S1: Normal disc height and signal. No herniation. Severe facet  arthropathy bilaterally. No spinal canal stenosis. No foraminal  stenosis on either side. No spinal canal stenosis. Mild-moderate  bilateral neural foraminal narrowing.                                                                      IMPRESSION:  1. Diffuse degenerative change of the lumbar spine as detailed above.  2. Moderate spinal stenosis at the L4-L5 level. No other significant  spinal canal narrowing of the lumbar spine.  3. Moderate neural foraminal stenosis bilaterally at L4-L5. No other  significant neural foraminal narrowing of the lumbar spine.    03/17/2021 XR L spine  IMPRESSION: Five lumbar vertebral bodies. Unchanged vertebral body  heights and alignment. No acute compression fracture deformity. Slight  anterolisthesis of L4 on L5 minimally progressed in the interval. Mild  to moderate degenerative interspace narrowing with ventral greater  than lateral osteophyte/syndesmophyte formation in the lower thoracic  and upper lumbar spine similar to the prior exam.    Review Of Systems:  I am responding to those symptoms which are directly relevant to the specific indication for my consultation. I recommend that the patient follow up with their primary or referring provider to pursue any other symptoms which may be of concern.       Medical History:  He  has a past medical history of CARDIOVASCULAR SCREENING; LDL GOAL LESS THAN 130, Hypertension, benign essential, goal below 140/90 (9/16/2016), and Unspecified essential hypertension.     He  has a past surgical history that includes TOOTH EXTRACTION W/FORCEP and closed rx metatarsal fx (1978).    Family History  His family history includes Cancer in his brother and sister; Coronary Artery Disease in his  father; Hypertension in his brother and mother.     Social History:  Work: [employer]    Current living situation: Lives with wife - Heather   He  reports that he has never smoked. He has never used smokeless tobacco. He reports current alcohol use. He reports that he does not use drugs.        Current Medications:   He has a current medication list which includes the following prescription(s): hydrochlorothiazide, lisinopril, cyclobenzaprine, methylprednisolone, and naproxen sodium.     Allergies:    -- No Known Drug Allergies     PHYSICAL EXAMINATION:  /75 (BP Location: Right arm, Patient Position: Sitting, Cuff Size: Adult Regular)   Pulse 78   Wt 134.3 kg (296 lb)   BMI 41.87 kg/m     General: Pleasant, straightforward, WDWN individual.  Mental Status: Pleasant, direct, appropriate mood and affect  Resp: breathing is unlabored without audible wheeze  Vascular: Palpable pedal pulses, no cyanosis, no venous stasis changes  Heme: no visible ecchymosis or erythema on extremities  Skin: No notable rash    Neurologic:  Strength: All major muscle groups of the bilateral lower extremities have normal and symmetric muscle strength    Sensation: SILT in lower extremities bilaterally L3-S1     DTRs: bilateral lower extremity stretch reflexes are equal and symmetric (volitional)   Clonus: None    Gait: reveals normal meghan and stride     Musculoskeletal:  SI joint maneuvers: TTP SIJ (sacral sulcus/PSIS), Radha (pt pointing) pos, Mando pos, Gaenslen generalized, Yeoman's pos, posterior spring pos, Gillet pos w/ right sacral torsion    Lumbar Spine: Mildly reduced  ROM and pain with end-range,  Lumbar paraspinals diffusely tender to palpation at LS junction, post pelvic tilt, facet loading with generalized pain, Str Leg Raise negative, hip provocative maneuvers w/ lbp only       ASSESSMENT:  Aubrey Zavala is a pleasant 56 year old male who presents with:    #. Sacroiliac joint pain  (primary  encounter diagnosis)  #. Lumbar spondylosis  #. DDD (degenerative disc disease), lumbar  #. Lumbar facet arthropathy       PLAN:  - Refer for fluoroscopically guided bilateral sacroiliac joint injections  -We discussed the possibility of bilateral L3, L4, dorsal ramus 5 medial branch blocks.   The pathway from diagnostic medial branch blocks to radiofrequency denervation was discussed in detail with the patient today.  Risks and benefits were discussed and all questions were answered.  The patient states understanding and may wish to proceed.  There are no contraindications.  The patient understands they will have 2 diagnostic medial branch blocks; and after each block they will be required to keep a pain diary recording their pain scores approximately every 1/2 hour to hour until the pain has returned to baseline.  During the time after the block, the patient was instructed to perform activities which typically aggravates their pain.  The patient will contact the medical spine staff after each diagnostic block to assess the amount of benefit the patient received.  If the patient has a significantly positive response to each of these diagnostic blocks, they may move forward with the radiofrequency ablation procedure.  - Depending on his response and improvement in pain/symptoms will plan for additional, focused PT  - RTC for procedure.     Ready to learn, no apparent learning barriers.  Education provided on treatment plan according to patient's preferred learning style.  Patient verbalizes understanding.   __________________________________  Irvin Kelly MD  Physical Medicine & Rehabilitation        60 minutes spent on the date of the encounter doing chart review, review of test results, interpretation of tests, patient visit, documentation and discussion with family           Again, thank you for allowing me to participate in the care of your patient.        Sincerely,        Irvin Kelly MD

## 2021-07-23 NOTE — PROGRESS NOTES
Patient seen at the request of Dr. Bloom for an opinion and evaluation of medial branch blocks and radiofrequency ablation.      HISTORY OF PRESENT ILLNESS:  Aubrey Zavala is a 56 year old male who presents with a chief complaint of chronic bilateral low back pain. He is accompanied today by his wife.      Pain began several years ago and is associated with trauma. Patient fell off of a ladder at home and went to an ED and reports no acute issues. In February he fell on ice on his side -- this pain is new and different, particularly more intense than prior. He reports progressive worsening since February and worse throughout the day. Initial management has included several sessions of PT but stopped due to increased pain; he does continue some home exercises as he is able to tolerate. He did have an MRI Lumbar spine  Most recently, he has been seen and evaluated by neurosurgery, who did not     The pain is localized to the lumbosacral region R=L (50:50%); he denies numbness/tingling; described as sharp in nature. Pain is reported as 7/10 at rest today and up to 9/10 at worst in the last week. Symptoms are worse with generalized movements; and improved with relative rest. He does report some pain-related sleep disturbance.     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       PRIOR INJURIES/TREATMENT:   Physical Therapy:   PT helped initially but unable to continue to due increase in his pain and symptoms.      - Current Pain Medications -   Aleeve 220mg BID  Flexeril 5-10mg TID     - Prior/Trialed Pain Medications -   Medrol dosepak - did not help significantly     Prior Procedures:  Date      Procedure     Improvement (%)  None         Prior Related Surgery: None   Other (acupuncture, OMT, CMM, TENS, DME, etc.):   None     Specialists Seen - (with most recent, available notes and clinic visits reviewed)   1. Neurosurgery - Dr. Abbasi      IMAGING - reviewed    06/08/2021 MR L spine  FINDINGS: There are five lumbar-type vertebrae for the purposes of  this dictation.      There is normal alignment of the lumbar vertebrae. Vertebral body  heights of the lumbar spine are normal. Marrow signal throughout the  lumbar vertebrae is within normal limits. There is no evidence for  fracture or pathologic bony lesion of the lumbar spine.      There is loss of disc height, disc desiccation and posterior disc  bulging/herniation to varying degrees at all levels of the lumbar  spine with the exception of the normal-appearing L5-S1 disc.      The tip of the conus medullaris is at the upper L2 level which is  within normal limits. There is no evidence for intrathecal  abnormality.      Level by level:    T12-L1: Loss of disc height, disc desiccation and mild circumferential  disc bulging. No herniation. Mild facet arthropathy bilaterally. No  spinal canal stenosis. No foraminal stenosis on either side.  L1-L2: Loss of disc height, disc desiccation and mild circumferential  disc bulging. No herniation. Moderate facet arthropathy bilaterally.  No spinal canal stenosis. No foraminal stenosis on either side.  L2-L3: Loss of disc height, disc desiccation and mild circumferential  disc bulging. No herniation. Moderate facet arthropathy bilaterally.  No spinal canal stenosis. No foraminal stenosis on either side.  L3-L4: Loss of disc height, disc desiccation and mild circumferential  disc bulging. No herniation. Moderate facet arthropathy bilaterally.  No spinal canal stenosis. Mild bilateral neural foraminal narrowing.  L4-L5: Loss of disc height, disc desiccation and mild circumferential  disc bulging. No herniation. Severe facet arthropathy bilaterally.  Ligamentum flavum thickening bilaterally. Moderate spinal canal  stenosis. Moderate left foraminal stenosis. Moderate right foraminal  stenosis.  L5-S1: Normal disc height and signal. No herniation. Severe facet  arthropathy bilaterally. No  spinal canal stenosis. No foraminal  stenosis on either side. No spinal canal stenosis. Mild-moderate  bilateral neural foraminal narrowing.                                                                      IMPRESSION:  1. Diffuse degenerative change of the lumbar spine as detailed above.  2. Moderate spinal stenosis at the L4-L5 level. No other significant  spinal canal narrowing of the lumbar spine.  3. Moderate neural foraminal stenosis bilaterally at L4-L5. No other  significant neural foraminal narrowing of the lumbar spine.    03/17/2021 XR L spine  IMPRESSION: Five lumbar vertebral bodies. Unchanged vertebral body  heights and alignment. No acute compression fracture deformity. Slight  anterolisthesis of L4 on L5 minimally progressed in the interval. Mild  to moderate degenerative interspace narrowing with ventral greater  than lateral osteophyte/syndesmophyte formation in the lower thoracic  and upper lumbar spine similar to the prior exam.    Review Of Systems:  I am responding to those symptoms which are directly relevant to the specific indication for my consultation. I recommend that the patient follow up with their primary or referring provider to pursue any other symptoms which may be of concern.       Medical History:  He  has a past medical history of CARDIOVASCULAR SCREENING; LDL GOAL LESS THAN 130, Hypertension, benign essential, goal below 140/90 (9/16/2016), and Unspecified essential hypertension.     He  has a past surgical history that includes TOOTH EXTRACTION W/FORCEP and closed rx metatarsal fx (1978).    Family History  His family history includes Cancer in his brother and sister; Coronary Artery Disease in his father; Hypertension in his brother and mother.     Social History:  Work: [employer]    Current living situation: Lives with wife - Heather   He  reports that he has never smoked. He has never used smokeless tobacco. He reports current alcohol use. He reports that he  does not use drugs.        Current Medications:   He has a current medication list which includes the following prescription(s): hydrochlorothiazide, lisinopril, cyclobenzaprine, methylprednisolone, and naproxen sodium.     Allergies:    -- No Known Drug Allergies     PHYSICAL EXAMINATION:  /75 (BP Location: Right arm, Patient Position: Sitting, Cuff Size: Adult Regular)   Pulse 78   Wt 134.3 kg (296 lb)   BMI 41.87 kg/m     General: Pleasant, straightforward, WDWN individual.  Mental Status: Pleasant, direct, appropriate mood and affect  Resp: breathing is unlabored without audible wheeze  Vascular: Palpable pedal pulses, no cyanosis, no venous stasis changes  Heme: no visible ecchymosis or erythema on extremities  Skin: No notable rash    Neurologic:  Strength: All major muscle groups of the bilateral lower extremities have normal and symmetric muscle strength    Sensation: SILT in lower extremities bilaterally L3-S1     DTRs: bilateral lower extremity stretch reflexes are equal and symmetric (volitional)   Clonus: None    Gait: reveals normal meghan and stride     Musculoskeletal:  SI joint maneuvers: TTP SIJ (sacral sulcus/PSIS), Radha (pt pointing) pos, Mando pos, Gaenslen generalized, Yeoman's pos, posterior spring pos, Gillet pos w/ right sacral torsion    Lumbar Spine: Mildly reduced  ROM and pain with end-range,  Lumbar paraspinals diffusely tender to palpation at LS junction, post pelvic tilt, facet loading with generalized pain, Str Leg Raise negative, hip provocative maneuvers w/ lbp only       ASSESSMENT:  Aubrey Zavala is a pleasant 56 year old male who presents with:    #. Sacroiliac joint pain  (primary encounter diagnosis)  #. Lumbar spondylosis  #. DDD (degenerative disc disease), lumbar  #. Lumbar facet arthropathy       PLAN:  - Refer for fluoroscopically guided bilateral sacroiliac joint injections  -We discussed the possibility of bilateral L3, L4, dorsal ramus 5 medial branch  blocks.   The pathway from diagnostic medial branch blocks to radiofrequency denervation was discussed in detail with the patient today.  Risks and benefits were discussed and all questions were answered.  The patient states understanding and may wish to proceed.  There are no contraindications.  The patient understands they will have 2 diagnostic medial branch blocks; and after each block they will be required to keep a pain diary recording their pain scores approximately every 1/2 hour to hour until the pain has returned to baseline.  During the time after the block, the patient was instructed to perform activities which typically aggravates their pain.  The patient will contact the medical spine staff after each diagnostic block to assess the amount of benefit the patient received.  If the patient has a significantly positive response to each of these diagnostic blocks, they may move forward with the radiofrequency ablation procedure.  - Depending on his response and improvement in pain/symptoms will plan for additional, focused PT  - RTC for procedure.     Ready to learn, no apparent learning barriers.  Education provided on treatment plan according to patient's preferred learning style.  Patient verbalizes understanding.   __________________________________  Irvin Kelly MD  Physical Medicine & Rehabilitation        60 minutes spent on the date of the encounter doing chart review, review of test results, interpretation of tests, patient visit, documentation and discussion with family

## 2021-07-27 ENCOUNTER — LAB (OUTPATIENT)
Dept: URGENT CARE | Facility: URGENT CARE | Age: 56
End: 2021-07-27
Payer: COMMERCIAL

## 2021-07-27 DIAGNOSIS — Z11.59 ENCOUNTER FOR SCREENING FOR OTHER VIRAL DISEASES: ICD-10-CM

## 2021-07-27 PROCEDURE — U0005 INFEC AGEN DETEC AMPLI PROBE: HCPCS

## 2021-07-27 PROCEDURE — U0003 INFECTIOUS AGENT DETECTION BY NUCLEIC ACID (DNA OR RNA); SEVERE ACUTE RESPIRATORY SYNDROME CORONAVIRUS 2 (SARS-COV-2) (CORONAVIRUS DISEASE [COVID-19]), AMPLIFIED PROBE TECHNIQUE, MAKING USE OF HIGH THROUGHPUT TECHNOLOGIES AS DESCRIBED BY CMS-2020-01-R: HCPCS

## 2021-07-28 LAB — SARS-COV-2 RNA RESP QL NAA+PROBE: NEGATIVE

## 2021-07-30 ENCOUNTER — HOSPITAL ENCOUNTER (OUTPATIENT)
Facility: AMBULATORY SURGERY CENTER | Age: 56
Discharge: HOME | End: 2021-07-30
Attending: PHYSICAL MEDICINE & REHABILITATION | Admitting: PHYSICAL MEDICINE & REHABILITATION
Payer: COMMERCIAL

## 2021-07-30 ENCOUNTER — ANCILLARY PROCEDURE (OUTPATIENT)
Dept: GENERAL RADIOLOGY | Facility: CLINIC | Age: 56
End: 2021-07-30
Attending: PHYSICAL MEDICINE & REHABILITATION
Payer: COMMERCIAL

## 2021-07-30 VITALS
OXYGEN SATURATION: 97 % | TEMPERATURE: 98 F | SYSTOLIC BLOOD PRESSURE: 121 MMHG | DIASTOLIC BLOOD PRESSURE: 83 MMHG | RESPIRATION RATE: 16 BRPM | HEART RATE: 81 BPM

## 2021-07-30 DIAGNOSIS — M53.3 SACROILIAC JOINT PAIN: ICD-10-CM

## 2021-07-30 DIAGNOSIS — M53.3 CHRONIC SI JOINT PAIN: ICD-10-CM

## 2021-07-30 DIAGNOSIS — G89.29 CHRONIC SI JOINT PAIN: ICD-10-CM

## 2021-07-30 PROCEDURE — 27096 INJECT SACROILIAC JOINT: CPT | Mod: 50

## 2021-07-30 PROCEDURE — G8918 PT W/O PREOP ORDER IV AB PRO: HCPCS

## 2021-07-30 PROCEDURE — G8907 PT DOC NO EVENTS ON DISCHARG: HCPCS

## 2021-07-30 RX ORDER — IOPAMIDOL 408 MG/ML
INJECTION, SOLUTION INTRATHECAL PRN
Status: DISCONTINUED | OUTPATIENT
Start: 2021-07-30 | End: 2021-07-30 | Stop reason: HOSPADM

## 2021-07-30 RX ORDER — LIDOCAINE HYDROCHLORIDE 10 MG/ML
INJECTION, SOLUTION EPIDURAL; INFILTRATION; INTRACAUDAL; PERINEURAL PRN
Status: DISCONTINUED | OUTPATIENT
Start: 2021-07-30 | End: 2021-07-30 | Stop reason: HOSPADM

## 2021-07-30 RX ORDER — TRIAMCINOLONE ACETONIDE 40 MG/ML
INJECTION, SUSPENSION INTRA-ARTICULAR; INTRAMUSCULAR PRN
Status: DISCONTINUED | OUTPATIENT
Start: 2021-07-30 | End: 2021-07-30 | Stop reason: HOSPADM

## 2021-07-30 NOTE — DISCHARGE INSTRUCTIONS
PAIN INJECTION HOME CARE INSTRUCTIONS  Activity  -You may resume most normal activity levels with the exception of strenuous activity. It may help to move in ways that hurt before the injection, to see if the pain is still there, but do not overdo it. Take it easy for the rest of the day.    -DO NOT remove bandaid for 24 hours  -DO NOT shower for 24 hours      Pain  -You may feel immediate pain relief and numbness for a period of time after the injection. This may indicate the medication has reached the right spot.  -Your pain may return after this short pain-free period, or may even be a little worse for a day or two. It may be caused by needle irritation or by the medication itself. The medications usually take two or three days to start working, but can take as long as a week.    -You may use an ice pack for 20 minutes every 2 hours for the first 24 hours  -You may use a heating pad after the first 24 hours  -You may use Tylenol (acetaminophen) every 4 hours or other pain medicines as directed by your physician      DID YOU RECEIVE SEDATION TODAY?  No    If you received sedation please follow these additional safety measures.    Sedation medicine, if given, may remain active for many hours. It is important for the next 24 hours that you do not:  -Drive a car  -Operate machines or power tools  -Consume alcohol, including beer  -Sign any important papers or legal documents    DID YOU RECEIVE STEROIDS TODAY?  Yes    Common side effects of steroids:  Not everyone will experience corticosteroid side effects. If side effects are experienced, they will gradually subside in the 7-10 day period following an injection. Most common side effects include:  -Flushed face and/or chest  -Feeling of warmth, particularly in the face but could be an overall feeling of warmth  -Increased blood sugar in diabetic patients  -Menstrual irregularities my occur. If taking hormone-based birth control an alternate method of birth control is  recommended  -Sleep disturbances and/or mood swings are possible  -Leg cramps    PLEASE KEEP TRACK OF YOUR SYMPTOMS AND NOTE ANY CHANGES FOR YOUR DOCTOR.       Please contact us if you have:  -Severe pain  -Fever more than 101.5 degrees Fahrenheit  -Signs of infection at the injection site (redness, swelling, or drainage)    If you have questions, please contact the Pain Clinic at [phone] Option #1 between the hours of 7:00 am and 3:00 pm Monday through Friday. After office hours you can contact the on call provider by dialing [phone]. If you need immediate attention, we recommend that you go to a hospital emergency room or dial [phone].    For patients seen by the PM and R service, please call [phone].    If you have procedure scheduling questions please call [phone] Option #2

## 2021-07-30 NOTE — PROCEDURES
PROCEDURE NOTE: Sacroiliac Joint Injection    PROCEDURE DATE: 7/30/2021    PATIENT NAME: Aubrey Zavala  YOB: 1965    ATTENDING PHYSICIAN: Irvin Kelly MD   RESIDENT/FELLOW: None    PREOPERATIVE DIAGNOSIS: Bilateral Sacroiliac Joint Pain  POSTOPERATIVE DIAGNOSIS: Same    PROCEDURE PERFORMED: Bilateral Sacroiliac joint injection with fluoroscopic guidance      FLUOROSCOPY WAS USED.     INDICATIONS FOR PROCEDURE:   Aubrey Zavala is a 56 year old male with a clinical picture consistent with Bilateral sacroiliac joint pain.    PROCEDURE AND FINDINGS:    He was greeted in the pre-procedure holding area. The risk, benefits and alternatives to the procedure were again reviewed with the patient and written informed consent was placed in the chart. An IV line was not placed.  A 500 mL bag of NS was not connected to the patient. he was taken to the procedure room and positioned prone on the fluoroscopy table.  Routine monitors were applied including EKG leads, blood pressure cuff, and pulse oximetry. Prior to the procedure a time out was completed, verifying correct patient, procedure, site, positioning, and implants and/or special equipment.     The skin was prepped with chlorhexidine and draped in the usual sterile fashion.  A  film was taken to identify the Bilateral sacroiliac joint and the inferior most intersection of the anterior and posterior sacroiliac joint lines. The overlying skin and subcutaneous tissues were anesthetized using a 25-gauge 1-1/2 inch needle with 1% preservative-free lidocaine for a total volume of 1.5 mls. A 22-gauge 3.5-inch Quincke spinal needle was advanced into the Bilateral sacroiliac joint space under intermittent fluoroscopic guidance. Needle position was confirmed on both AP and lateral views.     Then, after negative aspiration, isovue 200-220 mg/ml contrast was injected and confirmed adequate intraarticular spread. There was no evidence of intravascular uptake.  At this point, after negative aspiration, a total of 3mL volume of treatment injectate, consisting of 1mL Kenalog (40mg/mL) and 2mL of 1% Lidocaine, was injected easily per side. 2.5mL was injected into the intra-articular space while 0.5mL was redirected to the ligaments posterior to the joint.  The needle was then flushed with 0.5 ml of local anesthetic and removed. The needle insertion site was dressed appropriately.     Before the procedure, he reported a pain score of 7/10.     Aubrey Zavala was taken to the recovery room where he was monitored for a brief period of time. he tolerated the procedure well and was discharged home in stable condition with post procedural instructions.     Follow-up will be in clinic     COMPLICATIONS: None    COMMENTS: None

## 2021-09-05 ENCOUNTER — HEALTH MAINTENANCE LETTER (OUTPATIENT)
Age: 56
End: 2021-09-05

## 2021-10-20 ENCOUNTER — TELEPHONE (OUTPATIENT)
Dept: FAMILY MEDICINE | Facility: CLINIC | Age: 56
End: 2021-10-20

## 2021-10-20 NOTE — TELEPHONE ENCOUNTER
Reason for Call:  Other form    Detailed comments: patient was in for a biometric screening in March with Olivia Nunn. He is wondering if anyone can fill out his biometric screening form or if he will have to be seen since Olivia left.    Phone Number Patient can be reached at: Home number on file [phone] (home)    Best Time: any    Can we leave a detailed message on this number? YES    Call taken on 10/20/2021 at 3:21 PM by Britni Melendez

## 2021-10-26 ENCOUNTER — IMMUNIZATION (OUTPATIENT)
Dept: NURSING | Facility: CLINIC | Age: 56
End: 2021-10-26
Payer: COMMERCIAL

## 2021-10-26 PROCEDURE — 90471 IMMUNIZATION ADMIN: CPT

## 2021-10-26 PROCEDURE — 90662 IIV NO PRSV INCREASED AG IM: CPT

## 2021-10-26 PROCEDURE — 91300 PR COVID VAC PFIZER DIL RECON 30 MCG/0.3 ML IM: CPT

## 2021-10-26 PROCEDURE — 0004A PR COVID VAC PFIZER DIL RECON 30 MCG/0.3 ML IM: CPT

## 2021-10-27 NOTE — TELEPHONE ENCOUNTER
Writer called patient and reviewed message/plan per Dr. Vargas.    Patient verbalized understanding and in agreement with plan.    Patient stated he dropped form off at clinic yesterday for Dr. Abreu to address/complete.    Per patient: form due by 11/15/21.    Informed patient forms can take up to 5 business days to process.    PEEWEE JoshiN, RN  [facility]

## 2021-10-27 NOTE — TELEPHONE ENCOUNTER
HP Provider-  1. Please advise if a covering provider would be willing to sign off on biometric screening form for patient?   A. Patient had a physical on 3/12/21 with SHANTHI Nunn CNP    Thank you!  PEEWEE JoshiN, RN  [facility]

## 2021-10-27 NOTE — TELEPHONE ENCOUNTER
Yes, As long as biometric screening is for the lab done during his last physical in March, OK to just fill out form and any provider can sign it.

## 2021-11-05 NOTE — TELEPHONE ENCOUNTER
Patient sent a message through PT CRM Request stating the Dr Abreu did not sign or date his [employer] form.   I had Dr Abreu re-sign form and date it she did add a couple of items on for total chol, glucose, and santos that patient was fasting she re-signed and I re-faxed to [phone]

## 2021-11-18 PROBLEM — G89.29 CHRONIC BILATERAL LOW BACK PAIN WITHOUT SCIATICA: Status: RESOLVED | Noted: 2021-03-26 | Resolved: 2021-11-18

## 2021-11-18 PROBLEM — M54.50 CHRONIC BILATERAL LOW BACK PAIN WITHOUT SCIATICA: Status: RESOLVED | Noted: 2021-03-26 | Resolved: 2021-11-18

## 2022-01-13 ENCOUNTER — OFFICE VISIT (OUTPATIENT)
Dept: FAMILY MEDICINE | Facility: CLINIC | Age: 57
End: 2022-01-13
Payer: COMMERCIAL

## 2022-01-13 ENCOUNTER — ANCILLARY PROCEDURE (OUTPATIENT)
Dept: GENERAL RADIOLOGY | Facility: CLINIC | Age: 57
End: 2022-01-13
Attending: FAMILY MEDICINE
Payer: COMMERCIAL

## 2022-01-13 ENCOUNTER — MEDICAL CORRESPONDENCE (OUTPATIENT)
Dept: HEALTH INFORMATION MANAGEMENT | Facility: CLINIC | Age: 57
End: 2022-01-13

## 2022-01-13 VITALS
BODY MASS INDEX: 40.88 KG/M2 | DIASTOLIC BLOOD PRESSURE: 82 MMHG | RESPIRATION RATE: 16 BRPM | HEIGHT: 71 IN | WEIGHT: 292 LBS | SYSTOLIC BLOOD PRESSURE: 120 MMHG | TEMPERATURE: 97 F | HEART RATE: 81 BPM | OXYGEN SATURATION: 98 %

## 2022-01-13 DIAGNOSIS — R80.9 MICROALBUMINURIA: ICD-10-CM

## 2022-01-13 DIAGNOSIS — R06.02 SHORTNESS OF BREATH: ICD-10-CM

## 2022-01-13 DIAGNOSIS — Z00.00 ROUTINE GENERAL MEDICAL EXAMINATION AT A HEALTH CARE FACILITY: Primary | ICD-10-CM

## 2022-01-13 DIAGNOSIS — E66.01 MORBID OBESITY (H): ICD-10-CM

## 2022-01-13 DIAGNOSIS — L03.039 ONYCHIA OF TOE, UNSPECIFIED LATERALITY: ICD-10-CM

## 2022-01-13 DIAGNOSIS — M53.3 SACROILIAC JOINT PAIN: ICD-10-CM

## 2022-01-13 DIAGNOSIS — I10 HYPERTENSION, BENIGN ESSENTIAL, GOAL BELOW 140/90: ICD-10-CM

## 2022-01-13 DIAGNOSIS — Z76.89 ENCOUNTER TO ESTABLISH CARE WITH NEW DOCTOR: ICD-10-CM

## 2022-01-13 PROBLEM — S39.012A STRAIN OF LUMBAR REGION: Status: RESOLVED | Noted: 2020-06-22 | Resolved: 2022-01-13

## 2022-01-13 LAB
ALBUMIN SERPL-MCNC: 3.9 G/DL (ref 3.4–5)
ALP SERPL-CCNC: 108 U/L (ref 40–150)
ALT SERPL W P-5'-P-CCNC: 32 U/L (ref 0–70)
ANION GAP SERPL CALCULATED.3IONS-SCNC: 5 MMOL/L (ref 3–14)
AST SERPL W P-5'-P-CCNC: 20 U/L (ref 0–45)
BILIRUB DIRECT SERPL-MCNC: <0.1 MG/DL (ref 0–0.2)
BILIRUB SERPL-MCNC: 0.3 MG/DL (ref 0.2–1.3)
BUN SERPL-MCNC: 22 MG/DL (ref 7–30)
CALCIUM SERPL-MCNC: 9.1 MG/DL (ref 8.5–10.1)
CHLORIDE BLD-SCNC: 105 MMOL/L (ref 94–109)
CHOLEST SERPL-MCNC: 172 MG/DL
CO2 SERPL-SCNC: 25 MMOL/L (ref 20–32)
CREAT SERPL-MCNC: 1.21 MG/DL (ref 0.66–1.25)
FASTING STATUS PATIENT QL REPORTED: YES
FASTING STATUS PATIENT QL REPORTED: YES
GFR SERPL CREATININE-BSD FRML MDRD: 70 ML/MIN/1.73M2
GLUCOSE BLD-MCNC: 108 MG/DL (ref 70–99)
GLUCOSE BLD-MCNC: 108 MG/DL (ref 70–99)
HDLC SERPL-MCNC: 50 MG/DL
LDLC SERPL CALC-MCNC: 76 MG/DL
NONHDLC SERPL-MCNC: 122 MG/DL
POTASSIUM BLD-SCNC: 4.6 MMOL/L (ref 3.4–5.3)
PROT SERPL-MCNC: 7.9 G/DL (ref 6.8–8.8)
PSA SERPL-MCNC: 0.38 UG/L (ref 0–4)
SODIUM SERPL-SCNC: 135 MMOL/L (ref 133–144)
TRIGL SERPL-MCNC: 231 MG/DL

## 2022-01-13 PROCEDURE — 99214 OFFICE O/P EST MOD 30 MIN: CPT | Mod: 25 | Performed by: FAMILY MEDICINE

## 2022-01-13 PROCEDURE — 36415 COLL VENOUS BLD VENIPUNCTURE: CPT | Performed by: FAMILY MEDICINE

## 2022-01-13 PROCEDURE — 90471 IMMUNIZATION ADMIN: CPT | Performed by: FAMILY MEDICINE

## 2022-01-13 PROCEDURE — 82248 BILIRUBIN DIRECT: CPT | Performed by: FAMILY MEDICINE

## 2022-01-13 PROCEDURE — G0103 PSA SCREENING: HCPCS | Performed by: FAMILY MEDICINE

## 2022-01-13 PROCEDURE — 82043 UR ALBUMIN QUANTITATIVE: CPT | Performed by: FAMILY MEDICINE

## 2022-01-13 PROCEDURE — 71046 X-RAY EXAM CHEST 2 VIEWS: CPT | Performed by: RADIOLOGY

## 2022-01-13 PROCEDURE — 80061 LIPID PANEL: CPT | Performed by: FAMILY MEDICINE

## 2022-01-13 PROCEDURE — 99396 PREV VISIT EST AGE 40-64: CPT | Mod: 25 | Performed by: FAMILY MEDICINE

## 2022-01-13 PROCEDURE — 80053 COMPREHEN METABOLIC PANEL: CPT | Performed by: FAMILY MEDICINE

## 2022-01-13 PROCEDURE — 90750 HZV VACC RECOMBINANT IM: CPT | Performed by: FAMILY MEDICINE

## 2022-01-13 RX ORDER — LISINOPRIL 40 MG/1
40 TABLET ORAL DAILY
Qty: 90 TABLET | Refills: 3 | Status: SHIPPED | OUTPATIENT
Start: 2022-01-13 | End: 2022-01-13

## 2022-01-13 RX ORDER — LISINOPRIL 40 MG/1
40 TABLET ORAL DAILY
Qty: 90 TABLET | Refills: 3 | Status: SHIPPED | OUTPATIENT
Start: 2022-01-13 | End: 2023-02-02

## 2022-01-13 RX ORDER — TERBINAFINE HYDROCHLORIDE 250 MG/1
250 TABLET ORAL DAILY
Qty: 90 TABLET | Refills: 0 | Status: SHIPPED | OUTPATIENT
Start: 2022-01-13 | End: 2023-04-12

## 2022-01-13 RX ORDER — TERBINAFINE HYDROCHLORIDE 250 MG/1
250 TABLET ORAL DAILY
Qty: 90 TABLET | Refills: 0 | Status: SHIPPED | OUTPATIENT
Start: 2022-01-13 | End: 2022-01-13

## 2022-01-13 RX ORDER — HYDROCHLOROTHIAZIDE 25 MG/1
25 TABLET ORAL DAILY
Qty: 90 TABLET | Refills: 3 | Status: SHIPPED | OUTPATIENT
Start: 2022-01-13 | End: 2023-02-02

## 2022-01-13 RX ORDER — HYDROCHLOROTHIAZIDE 25 MG/1
25 TABLET ORAL DAILY
Qty: 90 TABLET | Refills: 3 | Status: SHIPPED | OUTPATIENT
Start: 2022-01-13 | End: 2022-01-13

## 2022-01-13 ASSESSMENT — ENCOUNTER SYMPTOMS
SHORTNESS OF BREATH: 0
ABDOMINAL PAIN: 0
PALPITATIONS: 0
HEMATURIA: 0
PARESTHESIAS: 0
JOINT SWELLING: 0
NERVOUS/ANXIOUS: 0
FEVER: 0
COUGH: 0
ARTHRALGIAS: 0
DYSURIA: 0
SORE THROAT: 0
DIARRHEA: 0
DIZZINESS: 0
HEARTBURN: 0
WEAKNESS: 0
HEADACHES: 0
CONSTIPATION: 0
CHILLS: 0
HEMATOCHEZIA: 0
EYE PAIN: 0
FREQUENCY: 0
MYALGIAS: 1
NAUSEA: 0

## 2022-01-13 ASSESSMENT — MIFFLIN-ST. JEOR: SCORE: 2168.69

## 2022-01-13 NOTE — PROGRESS NOTES
SUBJECTIVE:   CC: Aubrey Zavala is an 57 year old male who presents for preventative health visit.       Patient has been advised of split billing requirements and indicates understanding: Yes  Healthy Habits:     Getting at least 3 servings of Calcium per day:  Yes    Bi-annual eye exam:  Yes    Dental care twice a year:  Yes    Sleep apnea or symptoms of sleep apnea:  None    Diet:  Regular (no restrictions)    Frequency of exercise:  None    Taking medications regularly:  Yes    Medication side effects:  Not applicable    PHQ-2 Total Score: 0    Breathing is shallow.  Hard to catch breath for last few years.    Weight been the same.  Does get less exercise.  Doesn't smoke.    2nd hand smoke exposure.  No one in family has COPD.  No chest x-ray or evaluation recently.    Also - back painful again.  Hasn't been back to back specialist again.  Injection helped in past.    Has tried calorie counting.  Atkins/high protein.  Went through whole nutrition plan at age 19.              Today's PHQ-2 Score:   PHQ-2 ( 1999 [employer]) 1/10/2022   Q1: Little interest or pleasure in doing things 0   Q2: Feeling down, depressed or hopeless 0   PHQ-2 Score 0   PHQ-2 Total Score (12-17 Years)- Positive if 3 or more points; Administer PHQ-A if positive -   Q1: Little interest or pleasure in doing things Not at all   Q2: Feeling down, depressed or hopeless Not at all   PHQ-2 Score 0       Abuse: Current or Past(Physical, Sexual or Emotional)- No  Do you feel safe in your environment? Yes    Have you ever done Advance Care Planning? (For example, a Health Directive, POLST, or a discussion with a medical provider or your loved ones about your wishes): No, advance care planning information given to patient to review.  Patient plans to discuss their wishes with loved ones or provider.      Social History     Tobacco Use     Smoking status: Never Smoker     Smokeless tobacco: Never Used   Substance Use Topics     Alcohol use: Yes      "Comment: occ     If you drink alcohol do you typically have >3 drinks per day or >7 drinks per week? No    No flowsheet data found.    Last PSA: No results found for: PSA    Reviewed orders with patient. Reviewed health maintenance and updated orders accordingly - Yes      Reviewed and updated as needed this visit by clinical staff  Tobacco  Allergies  Meds  Problems  Med Hx  Surg Hx  Fam Hx  Soc Hx         Reviewed and updated as needed this visit by Provider  Tobacco  Allergies  Meds  Problems  Med Hx  Surg Hx  Fam Hx            Review of Systems   Constitutional: Negative for chills and fever.   HENT: Negative for congestion, ear pain, hearing loss and sore throat.    Eyes: Negative for pain and visual disturbance.   Respiratory: Negative for cough and shortness of breath.    Cardiovascular: Negative for chest pain, palpitations and peripheral edema.   Gastrointestinal: Negative for abdominal pain, constipation, diarrhea, heartburn, hematochezia and nausea.   Genitourinary: Negative for dysuria, frequency, genital sores, hematuria, impotence, penile discharge and urgency.   Musculoskeletal: Positive for myalgias. Negative for arthralgias and joint swelling.   Skin: Negative for rash.   Neurological: Negative for dizziness, weakness, headaches and paresthesias.   Psychiatric/Behavioral: Negative for mood changes. The patient is not nervous/anxious.          OBJECTIVE:   /82 (BP Location: Right arm, Patient Position: Chair, Cuff Size: Adult Regular)   Pulse 81   Temp 97  F (36.1  C) (Temporal)   Resp 16   Ht 1.791 m (5' 10.5\")   Wt 132.5 kg (292 lb)   SpO2 98%   BMI 41.31 kg/m      Physical Exam  GENERAL: healthy, alert and no distress  EYES: Eyes grossly normal to inspection, PERRL and conjunctivae and sclerae normal  HENT: ear canals and TM's normal, nose and mouth without ulcers or lesions  NECK: no adenopathy, no asymmetry, masses, or scars and thyroid normal to palpation  RESP: lungs " clear to auscultation - no rales, rhonchi or wheezes  CV: regular rate and rhythm, normal S1 S2, no S3 or S4, no murmur, click or rub, no peripheral edema and peripheral pulses strong  MS: no gross musculoskeletal defects noted, no edema  SKIN: no suspicious lesions or rashes  NEURO: Normal strength and tone, mentation intact and speech normal  PSYCH: mentation appears normal, affect normal/bright    Diagnostic Test Results:  Labs reviewed in Epic    ASSESSMENT/PLAN:   Aubrey was seen today for physical.    Diagnoses and all orders for this visit:    Routine general medical examination at a health care facility  Comments:  Imm: Due for Zoster, done today. Otherwise UTD.  Colon: Colonoscopy 2020 - repeat in 2023  Labs: Check lipids, labs for htn and microalbuminuria.  PSA: today  Orders:  -     GLUCOSE; Future  -     PSA, screen; Future    Encounter to establish care with new doctor  Comments:  Pt of Olivia Ward who has left the practice.  Reviewed medications, prob list, and outside records (neurosurg, oncology) and updated chart.  Orders:  -     Lipid panel reflex to direct LDL Fasting; Future    Hypertension, benign essential, goal below 140/90  Comments:  Well controlled on lisionpril and hctz.  Refilled.  Check BMP today.  Orders:  -     Discontinue: lisinopril (ZESTRIL) 40 MG tablet; Take 1 tablet (40 mg) by mouth daily  -     Discontinue: hydrochlorothiazide (HYDRODIURIL) 25 MG tablet; Take 1 tablet (25 mg) by mouth daily  -     Basic metabolic panel  (Ca, Cl, CO2, Creat, Gluc, K, Na, BUN); Future  -     Albumin Random Urine Quantitative with Creat Ratio; Future  -     hydrochlorothiazide (HYDRODIURIL) 25 MG tablet; Take 1 tablet (25 mg) by mouth daily  -     lisinopril (ZESTRIL) 40 MG tablet; Take 1 tablet (40 mg) by mouth daily    Microalbuminuria  Comments:  First noted 3/2020.  Discussed importance of BP control, wt loss, and recheck today and yearly.  Orders:  -     Albumin Random Urine Quantitative  "with Creat Ratio; Future    Sacroiliac joint pain  Comments:  2021: followed by neurosurg Dr. Kelly  Today: recommend he start doing PT exercises, return to Dr. Kelly for repeat injection if needed  Start exercise, 5 min    BMI >35 with HTN (H)  Comments:  Wt loss recommended, may help reduce need for BP medicine.  Referred wt clinic  Start exercise - 5 minutes a day, then slow increase.  make it doable  Orders:  -     Comprehensive Weight Management; Future    Shortness of breath  Comments:  Progressive over last few years.  Does have 2nd hand smoke exposure.  Check CXR today  Start exercise.  If no improvement, PFT and CT scan  Orders:  -     XR Chest 2 Views; Future  -     Cancel: General PFT Lab (Please always keep checked); Future  -     Pulmonary Function Test; Future  -     Cancel: CT Chest w/o Contrast; Future    Onychia of toe, unspecified laterality  Comments:  Check LFTs today.  Terbinafine x 12 weeks  Recheck LFTs in 6 weeks (LAB ONLY)  Orders:  -     Hepatic panel (Albumin, ALT, AST, Bili, Alk Phos, TP); Future  -     Hepatic panel (Albumin, ALT, AST, Bili, Alk Phos, TP); Future  -     Discontinue: terbinafine (LAMISIL) 250 MG tablet; Take 1 tablet (250 mg) by mouth daily  -     terbinafine (LAMISIL) 250 MG tablet; Take 1 tablet (250 mg) by mouth daily    Other orders  -     REVIEW OF HEALTH MAINTENANCE PROTOCOL ORDERS  -     ZOSTER VACCINE RECOMBINANT ADJUVANTED IM NJX        Patient has been advised of split billing requirements and indicates understanding: Yes  COUNSELING:   Reviewed preventive health counseling, as reflected in patient instructions    Estimated body mass index is 41.31 kg/m  as calculated from the following:    Height as of this encounter: 1.791 m (5' 10.5\").    Weight as of this encounter: 132.5 kg (292 lb).     Weight management plan: Discussed healthy diet and exercise guidelines and referred specialist    He reports that he has never smoked. He has never used smokeless " tobacco.      Counseling Resources:  ATP IV Guidelines  Pooled Cohorts Equation Calculator  FRAX Risk Assessment  ICSI Preventive Guidelines  Dietary Guidelines for Americans, 2010  USDA's MyPlate  ASA Prophylaxis  Lung CA Screening    Anabel Abreu MD  [facility]

## 2022-01-13 NOTE — PATIENT INSTRUCTIONS
Aubrey was seen today for physical.    Diagnoses and all orders for this visit:    Routine general medical examination at a health care facility  Comments:  Imm: Due for Zoster, done today. Otherwise UTD.  Colon: Colonoscopy 2020 - repeat in 2023  Labs: Check lipids, labs for htn and microalbuminuria.  PSA: today  Orders:  -     GLUCOSE; Future  -     PSA, screen; Future    Encounter to establish care with new doctor  Comments:  Pt of Olivia Nunn's who has left the practice.  Reviewed medications, prob list, and outside records (neurosurg, oncology) and updated chart.  Orders:  -     Lipid panel reflex to direct LDL Fasting; Future    Hypertension, benign essential, goal below 140/90  Comments:  Well controlled on lisionpril and hctz.  Refilled.  Check BMP today.  Orders:  -     lisinopril (ZESTRIL) 40 MG tablet; Take 1 tablet (40 mg) by mouth daily  -     hydrochlorothiazide (HYDRODIURIL) 25 MG tablet; Take 1 tablet (25 mg) by mouth daily  -     Basic metabolic panel  (Ca, Cl, CO2, Creat, Gluc, K, Na, BUN); Future  -     Albumin Random Urine Quantitative with Creat Ratio; Future    Microalbuminuria  Comments:  First noted 3/2020.  Discussed importance of BP control, wt loss, and recheck today and yearly.  Orders:  -     Albumin Random Urine Quantitative with Creat Ratio; Future    Sacroiliac joint pain  Comments:  2021: followed by neurosurg Dr. Kelly  Today: recommend he start doing PT exercises, return to Dr. Kelly for repeat injection if needed    BMI >35 with HTN (H)  Comments:  Wt loss recommended, may help reduce need for BP medicine.  Referred wt clinic  Orders:  -     Comprehensive Weight Management; Future    Shortness of breath  Comments:  Progressive over last few years.  Does have 2nd hand smoke exposure.  Check CXR today  Start exercise.  If no improvement, PFT and CT scan  Orders:  -     XR Chest 2 Views; Future  -     Cancel: General PFT Lab (Please always keep checked); Future  -      Pulmonary Function Test; Future  -     Cancel: CT Chest w/o Contrast; Future    Onychia of toe, unspecified laterality  Comments:  Check LFTs today.  Terbinafine x 12 weeks  Recheck LFTs in 6 weeks (LAB ONLY)  Orders:  -     Hepatic panel (Albumin, ALT, AST, Bili, Alk Phos, TP); Future  -     Hepatic panel (Albumin, ALT, AST, Bili, Alk Phos, TP); Future  -     terbinafine (LAMISIL) 250 MG tablet; Take 1 tablet (250 mg) by mouth daily    Other orders  -     REVIEW OF HEALTH MAINTENANCE PROTOCOL ORDERS  -     ZOSTER VACCINE RECOMBINANT ADJUVANTED IM NJX          Preventive Health Recommendations  Male Ages 50 - 64    Yearly exam:             See your health care provider every year in order to  o   Review health changes.   o   Discuss preventive care.    o   Review your medicines if your doctor has prescribed any.     Have a cholesterol test every 5 years, or more frequently if you are at risk for high cholesterol/heart disease.     Have a diabetes test (fasting glucose) every three years. If you are at risk for diabetes, you should have this test more often.     Have a colonoscopy at age 50, or have a yearly FIT test (stool test). These exams will check for colon cancer.      Talk with your health care provider about whether or not a prostate cancer screening test (PSA) is right for you.    You should be tested each year for STDs (sexually transmitted diseases), if you re at risk.     Shots: Get a flu shot each year. Get a tetanus shot every 10 years.     Nutrition:    Eat at least 5 servings of fruits and vegetables daily.     Eat whole-grain bread, whole-wheat pasta and brown rice instead of white grains and rice.     Get adequate Calcium and Vitamin D.     Lifestyle    Exercise for at least 150 minutes a week (30 minutes a day, 5 days a week). This will help you control your weight and prevent disease.     Limit alcohol to one drink per day.     No smoking.     Wear sunscreen to prevent skin cancer.     See your  dentist every six months for an exam and cleaning.     See your eye doctor every 1 to 2 years.

## 2022-01-14 PROBLEM — R73.03 PREDIABETES: Status: ACTIVE | Noted: 2022-01-14

## 2022-01-14 LAB
CREAT UR-MCNC: 147 MG/DL
MICROALBUMIN UR-MCNC: 18 MG/L
MICROALBUMIN/CREAT UR: 12.24 MG/G CR (ref 0–17)

## 2022-01-14 NOTE — RESULT ENCOUNTER NOTE
Lou Burgos,    It was nice to see you in clinic recently! Your results are back and show:    Your cholesterol is borderline high, but your overall heart disease risk score is low.  Continue working on adding in a bit of exercise.      A heart healthy Mediterranean-style diet rich in fish, olive oil, whole grains, vegetables and low in animal fats and processed foods is best to reduce your risk of heart disease.    Also:  1. Your kidney tests look better!  Good news.  2. Your glucose is slight elevated and may be a sign of early diabetes (prediabetes).  I advise that you eat a low carbohydrate diet, continue exercising ~30 minutes a day (walking after meals is great!), and rechecking your glucose level in 12 months.   3.  Your PSA (prostate cancer blood test)  is normal.  4.  Your liver tests are normal.      Let me know if you have any questions.  Best,  Dr. Anabel Abreu MD/[facility]

## 2022-02-20 ENCOUNTER — HEALTH MAINTENANCE LETTER (OUTPATIENT)
Age: 57
End: 2022-02-20

## 2022-04-13 ENCOUNTER — VIRTUAL VISIT (OUTPATIENT)
Dept: FAMILY MEDICINE | Facility: CLINIC | Age: 57
End: 2022-04-13
Payer: COMMERCIAL

## 2022-04-13 DIAGNOSIS — R73.03 PREDIABETES: ICD-10-CM

## 2022-04-13 DIAGNOSIS — G44.85 PRIMARY STABBING HEADACHE: Primary | ICD-10-CM

## 2022-04-13 PROCEDURE — 99214 OFFICE O/P EST MOD 30 MIN: CPT | Mod: GT | Performed by: FAMILY MEDICINE

## 2022-04-13 NOTE — PATIENT INSTRUCTIONS
You can call [phone] to schedule your imaging study at any of our [facility] [place] Locations.

## 2022-04-13 NOTE — PROGRESS NOTES
"Aubrey is a 57 year old who is being evaluated via a billable video visit.      How would you like to obtain your AVS? MyChart  If the video visit is dropped, the invitation should be resent by: Text to cell phone: [phone]  Will anyone else be joining your video visit? No    Video Start Time: 5:06 PM    Assessment & Plan       Primary stabbing headache    New headache that patient has never had before    Does have family history of brain tumor in father    Reviewed primary stabbing headache with patient and causes    We will get ESR to rule out vasculitis as cause    Additionally we will get MRI brain to rule out other physical causes    Discussed with patient who is in agreement with plan; he would like us to hold MRI results to release manually  - ESR: Erythrocyte sedimentation rate; Future  - MR Brain w/o & w Contrast; Future      Prediabetes    Due for check, glucose has been elevated last several times    Will schedule lab only  - HEMOGLOBIN A1C; Future\  Return in about 2 weeks (around 4/27/2022) for mri and lab only.    Anabel Abreu MD  [facility]    Subjective   Aubrey is a 57 year old who presents for the following health issues     History of Present Illness       Back Pain:  He presents for follow up of back pain. Patient's back pain is a chronic problem.  Location of back pain:  Right lower back and left lower back  Description of back pain: stabbing  Back pain spreads: nowhere    Since patient first noticed back pain, pain is: gradually worsening  Does back pain interfere with his job:  No      Migraines:   Since the patient's last clinic visit, headaches are: improved  The patient is getting headaches:  5 or 6 times a day  He is able to do normal daily activities when he has a migraine.  The patient is taking the following rescue/relief medications:  Naproxyn (Aleve)   Patient states \"The relief is inconsistent\" from the rescue/relief medications.   The patient is " "taking the following medications to prevent migraines:  No medications to prevent migraines  In the past 4 weeks, the patient has gone to an Urgent Care or Emergency Room 0 times times due to headaches.    He eats 2-3 servings of fruits and vegetables daily.He consumes 1 sweetened beverage(s) daily.He exercises with enough effort to increase his heart rate 9 or less minutes per day.  He exercises with enough effort to increase his heart rate 3 or less days per week.   He is taking medications regularly.       57 year old having new headaches lately.  He has never had these headaches before.  He describes them as sharp, stabbing pains in the back right of his head.  Occasionally on the left but mostly in the right.  They last 30 seconds or less at a time.  They do not happen at night.  He has never had similar.  He does have a family history of brain tumor in his father.  They happen repeatedly 7-8 times an hour.  Been better in the last few days.      They are somewhat sensitive to light.  Not sensitive to sound    Review of Systems   Constitutional, HEENT, cardiovascular, pulmonary, gi and gu systems are negative, except as otherwise noted.      Objective    Vitals - Patient Reported  Weight (Patient Reported): 133.8 kg (295 lb)  Height (Patient Reported): 177.8 cm (5' 10\")  BMI (Based on Pt Reported Ht/Wt): 42.33      Vitals:  No vitals were obtained today due to virtual visit.    Physical Exam   GENERAL: Healthy, alert and no distress  EYES: Eyes grossly normal to inspection.  No discharge or erythema, or obvious scleral/conjunctival abnormalities.  RESP: No audible wheeze, cough, or visible cyanosis.  No visible retractions or increased work of breathing.    SKIN: Visible skin clear. No significant rash, abnormal pigmentation or lesions.  NEURO: Cranial nerves grossly intact.  Mentation and speech appropriate for age.  PSYCH: Mentation appears normal, affect normal/bright, judgement and insight intact, normal " speech and appearance well-groomed.          Video-Visit Details    Type of service:  Video Visit    Video End Time:5:23 PM    Originating Location (pt. Location): Home    Distant Location (provider location):  [facility]     Platform used for Video Visit: [employer]

## 2022-05-02 ENCOUNTER — HOSPITAL ENCOUNTER (OUTPATIENT)
Dept: MRI IMAGING | Facility: CLINIC | Age: 57
Discharge: HOME | End: 2022-05-02
Attending: FAMILY MEDICINE | Admitting: FAMILY MEDICINE
Payer: COMMERCIAL

## 2022-05-02 DIAGNOSIS — G44.85 PRIMARY STABBING HEADACHE: ICD-10-CM

## 2022-05-02 PROCEDURE — A9585 GADOBUTROL INJECTION: HCPCS | Performed by: FAMILY MEDICINE

## 2022-05-02 PROCEDURE — 70553 MRI BRAIN STEM W/O & W/DYE: CPT

## 2022-05-02 PROCEDURE — 255N000002 HC RX 255 OP 636: Performed by: FAMILY MEDICINE

## 2022-05-02 RX ORDER — GADOBUTROL 604.72 MG/ML
13 INJECTION INTRAVENOUS ONCE
Status: DISCONTINUED | OUTPATIENT
Start: 2022-05-02 | End: 2022-05-02 | Stop reason: CLARIF

## 2022-05-02 RX ORDER — GADOBUTROL 604.72 MG/ML
0.1 INJECTION INTRAVENOUS ONCE
Status: COMPLETED | OUTPATIENT
Start: 2022-05-02 | End: 2022-05-02

## 2022-05-02 RX ADMIN — GADOBUTROL 13 ML: 604.72 INJECTION INTRAVENOUS at 20:48

## 2022-05-03 NOTE — RESULT ENCOUNTER NOTE
Tobin Burgos,  This note is to let you know that your results were normal.  Please let me know if you have any further questions or concerns.  Sincerely,  Dr. Anabel Abreu MD    Thank you for choosing the [facility] as your health care provider. Please don't hesitate to call with any questions or concerns [phone].

## 2022-06-24 ENCOUNTER — TELEPHONE (OUTPATIENT)
Dept: NEUROSURGERY | Facility: CLINIC | Age: 57
End: 2022-06-24

## 2022-06-24 NOTE — TELEPHONE ENCOUNTER
Patient called, stating has not been seen since 6/21. He is currently experiencing pain would like to speak to someone wondering if he needs to be seen. Please contact Vinh [phone]

## 2022-06-29 ENCOUNTER — OFFICE VISIT (OUTPATIENT)
Dept: NEUROSURGERY | Facility: CLINIC | Age: 57
End: 2022-06-29
Payer: COMMERCIAL

## 2022-06-29 VITALS — OXYGEN SATURATION: 98 % | DIASTOLIC BLOOD PRESSURE: 79 MMHG | SYSTOLIC BLOOD PRESSURE: 120 MMHG | HEART RATE: 98 BPM

## 2022-06-29 DIAGNOSIS — M54.9 MID BACK PAIN: Primary | ICD-10-CM

## 2022-06-29 PROCEDURE — 99213 OFFICE O/P EST LOW 20 MIN: CPT | Performed by: NURSE PRACTITIONER

## 2022-06-29 RX ORDER — METHYLPREDNISOLONE 4 MG
TABLET, DOSE PACK ORAL
Qty: 21 TABLET | Refills: 0 | Status: SHIPPED | OUTPATIENT
Start: 2022-06-29 | End: 2023-04-12

## 2022-06-29 ASSESSMENT — PAIN SCALES - GENERAL: PAINLEVEL: SEVERE PAIN (7)

## 2022-06-29 NOTE — PROGRESS NOTES
[facility]  Neurosurgery Follow Up:    HPI: 57M who has been seen in clinic for left-sided low back pain. He presents today for evaluation of new symptoms. He states last week he was woodworking and believes he strained his back. He describes bilateral mid back pain to the low back. He denies any specific injury or trauma. He denies any radicular pain. No overt weakness. He tried some muscle relaxers which did not provide him with much relief. He states symptoms have improved since onset.     Medical, surgical, family, and social history unchanged since prior exam.  Exam:  Constitutional:  Alert, well nourished, NAD.  HEENT: Normocephalic, atraumatic.   Pulm:  Without shortness of breath   CV:  No pitting edema of BLE.      Vital Signs:  /79   Pulse 98   SpO2 98%     Neurological:  Awake  Alert  Oriented x 3  Motor exam:        IP Q DF PF EHL  R   5  5   5   5    5  L   5  5   5   5    5     Able to spontaneously move L/E bilaterally  Sensation intact throughout all L/E dermatomes     Imaging:   Lumbar MRI 6/8/2021  IMPRESSION:  1. Diffuse degenerative change of the lumbar spine as detailed above.  2. Moderate spinal stenosis at the L4-L5 level. No other significant spinal canal narrowing of the lumbar spine.  3. Moderate neural foraminal stenosis bilaterally at L4-L5. No other significant neural foraminal narrowing of the lumbar spine.    A/P: Mid back pain, muscle strain. Plan for MDP at this time. He will contact our office if symptoms persist or worsen. He verbalized understanding and agreement.  Patient Instructions   -Medrol dose pack ordered and sent to your pharmacy. Please take as directed.   -Please contact our clinic with questions or concerns at [phone].      Joanna Gonsalez, ONESIMO  [facility]  [address] [place] [address]  [address] [place] [id number]  Tel [phone]  Fax [phone]

## 2022-06-29 NOTE — LETTER
6/29/2022         RE: Aubrey Zavala  [address]  [facility] [address]        Dear Colleague,    Thank you for referring your patient, Aubrey Zavala, to the [facility] NEUROLOGY CLINICS [facility]. Please see a copy of my visit note below.    [facility]  Neurosurgery Follow Up:    HPI: 57M who has been seen in clinic for left-sided low back pain. He presents today for evaluation of new symptoms. He states last week he was woodworking and believes he strained his back. He describes bilateral mid back pain to the low back. He denies any specific injury or trauma. He denies any radicular pain. No overt weakness. He tried some muscle relaxers which did not provide him with much relief. He states symptoms have improved since onset.     Medical, surgical, family, and social history unchanged since prior exam.  Exam:  Constitutional:  Alert, well nourished, NAD.  HEENT: Normocephalic, atraumatic.   Pulm:  Without shortness of breath   CV:  No pitting edema of BLE.      Vital Signs:  /79   Pulse 98   SpO2 98%     Neurological:  Awake  Alert  Oriented x 3  Motor exam:        IP Q DF PF EHL  R   5  5   5   5    5  L   5  5   5   5    5     Able to spontaneously move L/E bilaterally  Sensation intact throughout all L/E dermatomes     Imaging:   Lumbar MRI 6/8/2021  IMPRESSION:  1. Diffuse degenerative change of the lumbar spine as detailed above.  2. Moderate spinal stenosis at the L4-L5 level. No other significant spinal canal narrowing of the lumbar spine.  3. Moderate neural foraminal stenosis bilaterally at L4-L5. No other significant neural foraminal narrowing of the lumbar spine.    A/P: Mid back pain, muscle strain. Plan for MDP at this time. He will contact our office if symptoms persist or worsen. He verbalized understanding and agreement.  Patient Instructions   -Medrol dose pack ordered and sent to your pharmacy. Please take as directed.   -Please contact our clinic  with questions or concerns at [phone].      Joanna Gonsalez, ONESIMO  [place] [id number] [address] [address]  Tel [phone]  Fax [phone]        Again, thank you for allowing me to participate in the care of your patient.        Sincerely,        Joanna Gonsalez, NP

## 2022-06-29 NOTE — NURSING NOTE
"Aubrey Zavala is a 57 year old male who presents for:  Chief Complaint   Patient presents with     RECHECK     New pain   Lumbar Spondylosis         Initial Vitals:  /79   Pulse 98   SpO2 98%  Estimated body mass index is 41.31 kg/m  as calculated from the following:    Height as of 1/13/22: 5' 10.5\" (1.791 m).    Weight as of 1/13/22: 292 lb (132.5 kg).. There is no height or weight on file to calculate BSA. BP completed using cuff size: large  Severe Pain (7)        Meghan Carnes  "

## 2022-06-29 NOTE — PATIENT INSTRUCTIONS
-Medrol dose pack ordered and sent to your pharmacy. Please take as directed.   -Please contact our clinic with questions or concerns at [phone].

## 2022-10-23 ENCOUNTER — HEALTH MAINTENANCE LETTER (OUTPATIENT)
Age: 57
End: 2022-10-23

## 2022-12-29 ENCOUNTER — MYC MEDICAL ADVICE (OUTPATIENT)
Dept: NEUROSURGERY | Facility: CLINIC | Age: 57
End: 2022-12-29

## 2022-12-29 DIAGNOSIS — M54.9 MID BACK PAIN: Primary | ICD-10-CM

## 2022-12-29 NOTE — TELEPHONE ENCOUNTER
Pt requesting MDP -    Pt states pain has been increased for the last couple of months after pt raked leaves. Pt going to [place] on Saturday, he is looking for MDP to help with short term pain control.     Routed to Joanna Gonsalez NP for approval and if recc follow-up appt.

## 2023-01-02 RX ORDER — METHYLPREDNISOLONE 4 MG
TABLET, DOSE PACK ORAL
Qty: 21 TABLET | Refills: 0 | Status: SHIPPED | OUTPATIENT
Start: 2023-01-02 | End: 2023-01-02

## 2023-01-02 RX ORDER — METHYLPREDNISOLONE 4 MG
TABLET, DOSE PACK ORAL
Qty: 21 TABLET | Refills: 0 | Status: SHIPPED | OUTPATIENT
Start: 2023-01-02 | End: 2023-04-12

## 2023-01-30 DIAGNOSIS — I10 HYPERTENSION, BENIGN ESSENTIAL, GOAL BELOW 140/90: ICD-10-CM

## 2023-02-02 ENCOUNTER — TELEPHONE (OUTPATIENT)
Dept: NEUROSURGERY | Facility: CLINIC | Age: 58
End: 2023-02-02
Payer: COMMERCIAL

## 2023-02-02 RX ORDER — LISINOPRIL 40 MG/1
TABLET ORAL
Qty: 90 TABLET | Refills: 0 | Status: SHIPPED | OUTPATIENT
Start: 2023-02-02 | End: 2023-04-12

## 2023-02-02 RX ORDER — HYDROCHLOROTHIAZIDE 25 MG/1
TABLET ORAL
Qty: 90 TABLET | Refills: 0 | Status: SHIPPED | OUTPATIENT
Start: 2023-02-02 | End: 2023-04-12

## 2023-02-02 NOTE — TELEPHONE ENCOUNTER
Last Visit: 6/29/22     Next Visit: none    Name of Provider:  Joanna Gonsalez NP    Assessment: Last seen and evaluated in clinic by Joanna on 6/29/22 for mid back pain and muscle strain.     She provided him with a MDP at that time and advised him to contact clinic if symptoms worsened.     Patient said that the MDP prescribed last summer was very helpful. Patient contacted our office end of December 2022 for another Rx for MDP.     Patient said that he completed the steroid on 1/9/23 and felt he only had two days of pain relief (only days 1 and 2 helpful on MDP) then pain returned to baseline.     Reports bilateral low back pain that continues. Reports numbness in small toes of both feet. No numbness in big toes. Left toes more consistently numb. Right toes intermittently or sporadically numb. Said this has been going on for 1 year and has progressively gotten worse. Denies any radicular pain or weakness to BLE. Rates back pain 7-8/10.    Last Lumbar MRI 6/8/2021    Reports he tried a few session of PT for his back and had to stop due to the pain.     Had bilateral SI Joint injection with Dr. Kelly 7/30/2021. Patient recalls this being helpful at that time.     Alternating ice/heat and taking Aleve prn for pain mgmt. Patient states muscle relaxer flexeril was not helpful (prescribed by his pcp)    Of note: patient updated that he will be out of town beginning 2/8/23 and returning 2/23.    Recommendation given:      Reviewed red flag symptoms. Patient denies new numbness, tingling, weakness, foot drag/drop, saddle anesthesia, incontinence, intractable pain, or shortness of breath. Informed patient to seek emergency care should these symptoms arise.    Message routed to provider for review.     Action needed from provider: Recommendations needed for continued bilat low back pain , numbness in toes.

## 2023-02-02 NOTE — TELEPHONE ENCOUNTER
Patient was prescribed methylPREDNISolone for his back pain and it has not helped. Patient is still experiencing back pain and would like to speak with Joanna Gonsalez or someone from her team to give options on different medication. Thank you.

## 2023-02-03 NOTE — TELEPHONE ENCOUNTER
Joanna Gonsalez NP recommends patient RTC to be evaluated.     Called patient and reviewed above recommendation. Patient verbalized understanding and was transferred to scheduling to facilitate appt.

## 2023-03-13 ENCOUNTER — PRE VISIT (OUTPATIENT)
Dept: NEUROSURGERY | Facility: CLINIC | Age: 58
End: 2023-03-13
Payer: COMMERCIAL

## 2023-03-13 ENCOUNTER — TELEPHONE (OUTPATIENT)
Dept: NEUROSURGERY | Facility: CLINIC | Age: 58
End: 2023-03-13
Payer: COMMERCIAL

## 2023-03-13 NOTE — TELEPHONE ENCOUNTER
"Called patient at mobile number regarding his appointment 3/22/23 (noted for \"continuing back pain\") to get current information given he hasn't been seen for some time.  "

## 2023-03-13 NOTE — TELEPHONE ENCOUNTER
FUTURE VISIT INFORMATION      FUTURE VISIT INFORMATION:    Date: 3/22/23    Time: 1030am    Location:  Clinic  REFERRAL INFORMATION:    Referring provider:  Tadeo Guerrero    Referring providers clinic:  [facility]     Reason for visit/diagnosis  Mid/low back pain that has been on going for a few months now, numbness in his toes and on the bottom of his left foot    RECORDS REQUESTED FROM:       Clinic name Comments Records Status Imaging Status

## 2023-03-22 ENCOUNTER — OFFICE VISIT (OUTPATIENT)
Dept: NEUROSURGERY | Facility: CLINIC | Age: 58
End: 2023-03-22
Payer: COMMERCIAL

## 2023-03-22 VITALS — OXYGEN SATURATION: 98 % | SYSTOLIC BLOOD PRESSURE: 135 MMHG | HEART RATE: 101 BPM | DIASTOLIC BLOOD PRESSURE: 83 MMHG

## 2023-03-22 DIAGNOSIS — M54.50 CHRONIC BILATERAL LOW BACK PAIN WITHOUT SCIATICA: ICD-10-CM

## 2023-03-22 DIAGNOSIS — G89.29 CHRONIC BILATERAL LOW BACK PAIN WITHOUT SCIATICA: ICD-10-CM

## 2023-03-22 DIAGNOSIS — M47.816 LUMBAR FACET ARTHROPATHY: Primary | ICD-10-CM

## 2023-03-22 PROCEDURE — 99213 OFFICE O/P EST LOW 20 MIN: CPT | Performed by: NURSE PRACTITIONER

## 2023-03-22 RX ORDER — METHOCARBAMOL 750 MG/1
750 TABLET, FILM COATED ORAL 3 TIMES DAILY PRN
Qty: 30 TABLET | Refills: 0 | Status: SHIPPED | OUTPATIENT
Start: 2023-03-22 | End: 2023-04-12

## 2023-03-22 ASSESSMENT — PAIN SCALES - GENERAL: PAINLEVEL: SEVERE PAIN (7)

## 2023-03-22 NOTE — LETTER
3/22/2023         RE: Aubrey Zavala  [address]  [facility] [address]        Dear Colleague,    Thank you for referring your patient, Aubrey Zavala, to the [facility] NEUROLOGY CLINICS [facility]. Please see a copy of my visit note below.    [facility]  Neurosurgery Follow Up:    HPI: 58M with a history of chronic low back pain who presents for ongoing symptoms. He describes bilateral low back pain without radicular symptoms. He has undergone MDP recently without much benefit. He reports some bilateral foot/toe paresthesias as well. No new symptoms since LOV. He has discussed MBB/RFA with Dr. Kelly in the past and is interested in this as an option again.    Medical, surgical, family, and social history unchanged since prior exam.  Exam:  Constitutional:  Alert, well nourished, NAD.  HEENT: Normocephalic, atraumatic.   Pulm:  Without shortness of breath   CV:  No pitting edema of BLE.      Vital Signs:  /83   Pulse 101   SpO2 98%     Neurological:  Awake  Alert  Oriented x 3  Motor exam:        IP Q DF PF EHL  R   5  5   5   5    5  L   5  5   5   5    5     Able to spontaneously move L/E bilaterally  Sensation intact throughout all L/E dermatomes    Imaging:   Lumbar MRI 6/8/2021  IMPRESSION:  1. Diffuse degenerative change of the lumbar spine as detailed above.  2. Moderate spinal stenosis at the L4-L5 level. No other significant  spinal canal narrowing of the lumbar spine.  3. Moderate neural foraminal stenosis bilaterally at L4-L5. No other  significant neural foraminal narrowing of the lumbar spine.    A/P:   Lumbar facet arthropathy  Chronic bilateral low back pain without sciatica    Will proceed with PT and MBB workup for RFA with Dr. Kelly at this time. He will contact our office with any new/worsening symptoms. He verbalized understanding and agreement.    Patient Instructions   -Physical therapy ordered. They will contact you to schedule.  -Lumbar medial  gia cardoso ordered with Dr. Kelly. His office will contact you to schedule.  -Please contact our clinic with questions or concerns at [phone].      Joanna Gonsalez CNP  [address] [address]  Tel [phone]  Fax [phone]        Again, thank you for allowing me to participate in the care of your patient.        Sincerely,        Joanna Gonsalez, NP

## 2023-03-22 NOTE — PROGRESS NOTES
[facility]  Neurosurgery Follow Up:    HPI: 58M with a history of chronic low back pain who presents for ongoing symptoms. He describes bilateral low back pain without radicular symptoms. He has undergone MDP recently without much benefit. He reports some bilateral foot/toe paresthesias as well. No new symptoms since LOV. He has discussed MBB/RFA with Dr. Kelly in the past and is interested in this as an option again.    Medical, surgical, family, and social history unchanged since prior exam.  Exam:  Constitutional:  Alert, well nourished, NAD.  HEENT: Normocephalic, atraumatic.   Pulm:  Without shortness of breath   CV:  No pitting edema of BLE.      Vital Signs:  /83   Pulse 101   SpO2 98%     Neurological:  Awake  Alert  Oriented x 3  Motor exam:        IP Q DF PF EHL  R   5  5   5   5    5  L   5  5   5   5    5     Able to spontaneously move L/E bilaterally  Sensation intact throughout all L/E dermatomes    Imaging:   Lumbar MRI 6/8/2021  IMPRESSION:  1. Diffuse degenerative change of the lumbar spine as detailed above.  2. Moderate spinal stenosis at the L4-L5 level. No other significant  spinal canal narrowing of the lumbar spine.  3. Moderate neural foraminal stenosis bilaterally at L4-L5. No other  significant neural foraminal narrowing of the lumbar spine.    A/P:   Lumbar facet arthropathy  Chronic bilateral low back pain without sciatica    Will proceed with PT and MBB workup for RFA with Dr. Kelly at this time. He will contact our office with any new/worsening symptoms. He verbalized understanding and agreement.    Patient Instructions   -Physical therapy ordered. They will contact you to schedule.  -Lumbar medial branch block ordered with Dr. Kelly. His office will contact you to schedule.  -Please contact our clinic with questions or concerns at [phone].      Joanna Gonsalez, ONESIMO  [facility]  [address] [place] [address]  [address] [place] [address]  [place] [address]  Tel [phone]  Fax [phone]

## 2023-03-22 NOTE — PATIENT INSTRUCTIONS
-Physical therapy ordered. They will contact you to schedule.  -Lumbar medial branch block ordered with Dr. Kelly. His office will contact you to schedule.  -Please contact our clinic with questions or concerns at [phone].

## 2023-03-22 NOTE — NURSING NOTE
"Aubrey Zavala is a 58 year old male who presents for:  Chief Complaint   Patient presents with     RECHECK     Mid to low back pain that has stabbing sensations, left foot is always numb, right foot on and off numbness        Initial Vitals:  /83   Pulse 101   SpO2 98%  Estimated body mass index is 41.31 kg/m  as calculated from the following:    Height as of 1/13/22: 5' 10.5\" (1.791 m).    Weight as of 1/13/22: 292 lb (132.5 kg).. There is no height or weight on file to calculate BSA. BP completed using cuff size: regular  Severe Pain (7)    Nursing Comments:       Lisa Hitchcock    "

## 2023-03-23 ENCOUNTER — TELEPHONE (OUTPATIENT)
Dept: PALLIATIVE MEDICINE | Facility: CLINIC | Age: 58
End: 2023-03-23
Payer: COMMERCIAL

## 2023-03-23 NOTE — TELEPHONE ENCOUNTER
Screening questions for MBB Injections:    Injection to be done at which interventional clinic site? [facility]    Procedure ordered by      Procedure ordered? Lumbar Medial Branch Block    What insurance would patient like us to bill for this procedure? [facility]      MEDICA: REQUIRES A PA FOR BOTH MBB     Worker's comp- Any injection DO NOT SCHEDULE and route to Shaniqua Ochoa.      [employer] insurance - If scheduling an SI joint injection DO NOT SCHEDULE and route to Jennifer Martins.       MBBs must be scheduled with elapsed time interval of at least 2 weeks and not more than 6 months between the First MBB and the Second MBB for insurance purposes       [employer] - Any injection besides hip/shoulder/knee joint DO NOT SCHEDULE and route to Jennifer Martins. She will obtain PA and call pt back to schedule procedure or notify pt of denial.       [employer]- PA required for all MBB's      **BCBS- ALL need to be routed to Jennifer for review if a PA is needed**    IF SCHEDULING IN [place] PLEASE SCHEDULE AT LEAST 7-10         BUSINESS DAYS OUT SO A PA CAN BE OBTAINED      Genicular Nerve blocks- ALL insurances except for- [employer], Medicare (straight not supplement) and [employer]. Need to be reviewed by Jennifer before scheduling.       Any chance of pregnancy? NO   If YES, do NOT schedule and route to RN jaclyn  - Dr. Kelly route to Malia Dubon and PM&R Nurse  [[id number]]      Is an  needed? No     Patient has a drive home? (mandatory) Yes     Is patient taking any blood thinners (plavix, coumadin, jantoven, warfarin, heparin, pradaxa or dabigatran )? No    If hold needed, do NOT schedule, route to RN pool/ Dr. Kelly's Team     Is patient taking any aspirin products? No     If more than 325mg/day, OK to schedule; Instruct pt to decrease to less than 325 mg for 7 days AND route to RN pool/ Dr. Kelly's Team    For CERVICAL procedures, hold all aspirin products for 6 days.     Tell pt that if  aspirin product is not held for 6 days, the procedure WILL BE cancelled.      Does the patient have a bleeding or clotting disorder? No    If YES, okay to schedule AND route to RN nurse jaclyn/ Dr. Kelly's Team    **For any patients with platelet count <100, must be forwarded to provider**    Is patient diabetic? NO If YES, have them bring their glucometer.    Does patient have an active infection or treated for one within the past week? No    Is patient currently taking any antibiotics?  No    For patients on chronic, preventative, or prophylactic antibiotics, procedures may be scheduled.     For patients on antibiotics for active or recent infection:antibiotic course must have been completed for 4 days    Is patient currently taking any steroid medications? (i.e. Prednisone, Medrol)  No     For patients on steroid medications, course must have been completed for 4 days    Is patient actively being treated for cancer or immunocompromised? No   If YES, do NOT schedule and route to MELI/ Dr. Kelly's Team    Are you able to get on and off an exam table with minimal or no assistance? Yes   If NO, do NOT schedule and route to RN/ Dr. Kelly's Team    Are you able to roll over and lay on your stomach with minimal or no assistance? Yes   If NO, do NOT schedule and route to RN/ Dr. Kelly's Team    Any allergies to contrast dye, iodine, shellfish, or numbing and steroid medications? No  (If so, inform nursing and note in scheduling comments.)    Allergies: No known drug allergies     Does patient have an MRI/CT?  Not Applicable  Check Procedure Scheduling Grid to see if required.      Was the MRI done within the last 3 years?  NA    If yes, where was the MRI done i.e.[facility], [facility], [facility], [facility] etc?       If no, do not schedule and route to nursing/ Dr. Kelly's Team    If MRI was not done at [facility], [facility] or [facility] do NOT schedule and route to nursing.      If pt has an imaging disc, the  injection MAY be scheduled but pt has to bring disc to appt.     If they show up without the disc the injection cannot be done    Is patient able to transfer to a procedure table with minimal or no assistance? Yes  If NO, do NOT schedule and route to RN/ Dr. Kelly's Team    Medial Branch Block Pre-Procedure Instructions    It is okay to take long acting pain medications (if you are on them) the day of the procedure but try not to take any short acting medications unless absolutely necessary.    YES: ok   Long acting meds would include: Gabapentin (Neurontin), MS Contin, Oxycontin        Short acting meds would include:  Percocet, Oxycodone, Vicodin, Ibuprofen     The day of the procedure, you should try to do things that provoke your pain, since the injection is being done to see if it will relieve your pain . YES: ok     If your pain level is a 4 out of 10 or less on the day of the procedu re, please call [phone] to reschedule.  YES: ok     Reminders:      If you are started on any steroids or antibiotics between now and your appointment, you must contact us because it may affect our ability to perform your procedure ok      Instructed pt to arrive 30 minutes early for IV start if required. (Check Procedure Scheduling Grid) DUCNAN       If this is for a cervical MBB aspirin needs to be held for 6 days.  NO       Do not schedule procedures requiring IV placement in the first appointment of the day or first appointment after lunch. Do NOT schedule at 0745, 0815 or 1245.  ok      For patients 85 or older we recommend having an adult stay w/ them for the remainder of the day.      Does the patient have any questions? no

## 2023-03-28 ENCOUNTER — RADIOLOGY INJECTION OFFICE VISIT (OUTPATIENT)
Dept: GENERAL RADIOLOGY | Facility: CLINIC | Age: 58
End: 2023-03-28
Attending: PHYSICAL MEDICINE & REHABILITATION
Payer: COMMERCIAL

## 2023-03-28 VITALS
SYSTOLIC BLOOD PRESSURE: 119 MMHG | DIASTOLIC BLOOD PRESSURE: 74 MMHG | TEMPERATURE: 97.2 F | OXYGEN SATURATION: 95 % | RESPIRATION RATE: 16 BRPM | HEART RATE: 100 BPM

## 2023-03-28 DIAGNOSIS — M47.816 LUMBAR FACET ARTHROPATHY: ICD-10-CM

## 2023-03-28 DIAGNOSIS — M43.06 LUMBAR SPONDYLOLYSIS: ICD-10-CM

## 2023-03-28 PROCEDURE — 64494 INJ PARAVERT F JNT L/S 2 LEV: CPT | Mod: 50 | Performed by: PHYSICAL MEDICINE & REHABILITATION

## 2023-03-28 PROCEDURE — 64495 INJ PARAVERT F JNT L/S 3 LEV: CPT | Mod: 50

## 2023-03-28 PROCEDURE — 64493 INJ PARAVERT F JNT L/S 1 LEV: CPT | Mod: 50 | Performed by: PHYSICAL MEDICINE & REHABILITATION

## 2023-03-28 ASSESSMENT — PAIN SCALES - GENERAL
PAINLEVEL: MODERATE PAIN (4)
PAINLEVEL: SEVERE PAIN (7)

## 2023-03-28 NOTE — NURSING NOTE
Pre-procedure Intake  If YES to any questions or NO to having a   Please complete laminated checklist and leave on the computer keyboard for Provider, verbally inform provider if able.    For SCS Trial, RFA's or any sedation procedure:  Have you been fasting? NA    If yes, for how long?     Are you taking any any blood thinners such as Coumadin, Warfarin, Jantoven, Pradaxa Xarelto, Eliquis, Edoxaban, Enoxaparin, Lovenox, Heparin, Arixtra, Fondaparinux, or Fragmin? OR Antiplatelet medication such as Plavix, Brilinta, or Effient?   No     If yes, when did you take your last dose?     Do you take aspirin?  No    If cervical procedure, have you held aspirin for 6 days?   NA    Do you have any allergies to contrast dye, iodine, steroid and/or numbing medications?  NO    Are you currently taking antibiotics or have an active infection?  NO    Have you had a fever/elevated temperature within the past week? NO    Are you currently taking oral steroids? NO    Do you have a ? Yes    Are you pregnant or breastfeeding?  Not Applicable    Have you received the COVID-19 vaccine? NA    If yes, was it your 1st, 2nd or only dose needed?     Date of most recent vaccine:     Notify provider and RNs if systolic BP >170, diastolic BP >100, P >100 or O2 sats < 90%

## 2023-03-28 NOTE — NURSING NOTE
Discharge Information    IV Discontiued Time:  NA    Amount of Fluid Infused:  NA    Discharge Criteria = When patient returns to baseline or as per MD order    Consciousness:  Pt is fully awake    Circulation:  BP +/- 20% of pre-procedure level    Respiration:  Patient is able to breathe deeply    O2 Sat:  Patient is able to maintain O2 Sat >92% on room air    Activity:  Moves 4 extremities on command    Ambulation:  Patient is able to stand and walk or stand and pivot into wheelchair    Dressing:  Clean/dry     Notes:   Discharge instructions and AVS given to patient    Patient meets criteria for discharge?  YES    Admitted to PCU?  No    Responsible adult present to accompany patient home?  Yes    Signature/Title:    Kim Gudino RN  RN Care Coordinator  [facility]

## 2023-04-02 ENCOUNTER — HEALTH MAINTENANCE LETTER (OUTPATIENT)
Age: 58
End: 2023-04-02

## 2023-04-12 ENCOUNTER — ANCILLARY PROCEDURE (OUTPATIENT)
Dept: GENERAL RADIOLOGY | Facility: CLINIC | Age: 58
End: 2023-04-12
Attending: FAMILY MEDICINE
Payer: COMMERCIAL

## 2023-04-12 ENCOUNTER — LAB (OUTPATIENT)
Dept: FAMILY MEDICINE | Facility: CLINIC | Age: 58
End: 2023-04-12

## 2023-04-12 ENCOUNTER — OFFICE VISIT (OUTPATIENT)
Dept: FAMILY MEDICINE | Facility: CLINIC | Age: 58
End: 2023-04-12
Payer: COMMERCIAL

## 2023-04-12 VITALS
HEART RATE: 89 BPM | TEMPERATURE: 97.2 F | WEIGHT: 301.6 LBS | BODY MASS INDEX: 42.22 KG/M2 | HEIGHT: 71 IN | RESPIRATION RATE: 18 BRPM | OXYGEN SATURATION: 94 % | SYSTOLIC BLOOD PRESSURE: 122 MMHG | DIASTOLIC BLOOD PRESSURE: 64 MMHG

## 2023-04-12 DIAGNOSIS — G89.29 CHRONIC BILATERAL LOW BACK PAIN WITHOUT SCIATICA: ICD-10-CM

## 2023-04-12 DIAGNOSIS — G62.9 PERIPHERAL POLYNEUROPATHY: ICD-10-CM

## 2023-04-12 DIAGNOSIS — I10 HYPERTENSION, BENIGN ESSENTIAL, GOAL BELOW 140/90: ICD-10-CM

## 2023-04-12 DIAGNOSIS — Z12.11 SCREEN FOR COLON CANCER: ICD-10-CM

## 2023-04-12 DIAGNOSIS — R06.02 SHORTNESS OF BREATH: ICD-10-CM

## 2023-04-12 DIAGNOSIS — Z12.5 SCREENING FOR PROSTATE CANCER: ICD-10-CM

## 2023-04-12 DIAGNOSIS — Z00.00 ROUTINE GENERAL MEDICAL EXAMINATION AT A HEALTH CARE FACILITY: Primary | ICD-10-CM

## 2023-04-12 DIAGNOSIS — E66.813 CLASS 3 OBESITY: ICD-10-CM

## 2023-04-12 DIAGNOSIS — M54.50 CHRONIC BILATERAL LOW BACK PAIN WITHOUT SCIATICA: ICD-10-CM

## 2023-04-12 DIAGNOSIS — E78.5 HYPERLIPIDEMIA LDL GOAL <100: ICD-10-CM

## 2023-04-12 DIAGNOSIS — M47.816 LUMBAR FACET ARTHROPATHY: ICD-10-CM

## 2023-04-12 DIAGNOSIS — L03.039 ONYCHIA OF TOE, UNSPECIFIED LATERALITY: ICD-10-CM

## 2023-04-12 LAB — HBA1C MFR BLD: 5.3 % (ref 0–5.6)

## 2023-04-12 PROCEDURE — 71046 X-RAY EXAM CHEST 2 VIEWS: CPT | Mod: TC | Performed by: RADIOLOGY

## 2023-04-12 PROCEDURE — 80048 BASIC METABOLIC PNL TOTAL CA: CPT | Performed by: FAMILY MEDICINE

## 2023-04-12 PROCEDURE — 82043 UR ALBUMIN QUANTITATIVE: CPT | Performed by: FAMILY MEDICINE

## 2023-04-12 PROCEDURE — 83036 HEMOGLOBIN GLYCOSYLATED A1C: CPT | Performed by: FAMILY MEDICINE

## 2023-04-12 PROCEDURE — 80061 LIPID PANEL: CPT | Performed by: FAMILY MEDICINE

## 2023-04-12 PROCEDURE — 99396 PREV VISIT EST AGE 40-64: CPT | Performed by: FAMILY MEDICINE

## 2023-04-12 PROCEDURE — 82607 VITAMIN B-12: CPT | Performed by: FAMILY MEDICINE

## 2023-04-12 PROCEDURE — G0103 PSA SCREENING: HCPCS | Performed by: FAMILY MEDICINE

## 2023-04-12 PROCEDURE — 99214 OFFICE O/P EST MOD 30 MIN: CPT | Mod: 25 | Performed by: FAMILY MEDICINE

## 2023-04-12 PROCEDURE — 82570 ASSAY OF URINE CREATININE: CPT | Performed by: FAMILY MEDICINE

## 2023-04-12 PROCEDURE — 36415 COLL VENOUS BLD VENIPUNCTURE: CPT | Performed by: FAMILY MEDICINE

## 2023-04-12 RX ORDER — METHOCARBAMOL 750 MG/1
750 TABLET, FILM COATED ORAL 3 TIMES DAILY PRN
Qty: 30 TABLET | Refills: 0 | Status: SHIPPED | OUTPATIENT
Start: 2023-04-12 | End: 2023-07-11

## 2023-04-12 RX ORDER — LISINOPRIL 40 MG/1
40 TABLET ORAL DAILY
Qty: 90 TABLET | Refills: 3 | Status: SHIPPED | OUTPATIENT
Start: 2023-04-12 | End: 2023-06-03

## 2023-04-12 RX ORDER — HYDROCHLOROTHIAZIDE 25 MG/1
25 TABLET ORAL DAILY
Qty: 90 TABLET | Refills: 3 | Status: SHIPPED | OUTPATIENT
Start: 2023-04-12 | End: 2024-05-15

## 2023-04-12 ASSESSMENT — ENCOUNTER SYMPTOMS
PALPITATIONS: 0
NERVOUS/ANXIOUS: 0
DIARRHEA: 0
HEARTBURN: 0
FEVER: 0
CHILLS: 0
EYE PAIN: 0
HEMATURIA: 0
SORE THROAT: 0
PARESTHESIAS: 0
HEADACHES: 0
MYALGIAS: 0
ABDOMINAL PAIN: 0
DYSURIA: 0
FREQUENCY: 0
HEMATOCHEZIA: 0
ARTHRALGIAS: 0
COUGH: 0
SHORTNESS OF BREATH: 0
JOINT SWELLING: 0
CONSTIPATION: 0
NAUSEA: 0
WEAKNESS: 0
DIZZINESS: 0

## 2023-04-12 NOTE — PROGRESS NOTES
SUBJECTIVE:   CC: Aubrey is an 58 year old who presents for preventative health visit.       4/12/2023     3:49 PM   Additional Questions   Roomed by VERNON RN     Forms 4/12/2023   Any forms needing to be completed Yes   Patient has been advised of split billing requirements and indicates understanding: Yes  Healthy Habits:     Getting at least 3 servings of Calcium per day:  Yes    Bi-annual eye exam:  Yes    Dental care twice a year:  Yes    Sleep apnea or symptoms of sleep apnea:  None    Diet:  Regular (no restrictions)    Frequency of exercise:  None    Taking medications regularly:  Yes    Medication side effects:  None    PHQ-2 Total Score: 0    Additional concerns today:  Yes    Toes numb, both feet  Was supposed to get A1C this year    Back gotten worse  Heart      Toes - thickening      Today's PHQ-2 Score:       4/12/2023     3:45 PM   PHQ-2 ( 1999 [employer])   Q1: Little interest or pleasure in doing things 0   Q2: Feeling down, depressed or hopeless 0   PHQ-2 Score 0   Q1: Little interest or pleasure in doing things Not at all    Not at all   Q2: Feeling down, depressed or hopeless Not at all    Not at all   PHQ-2 Score 0    0           Social History     Tobacco Use     Smoking status: Never     Smokeless tobacco: Never   Vaping Use     Vaping status: Not on file   Substance Use Topics     Alcohol use: Yes     Comment: occ             4/12/2023     3:45 PM   Alcohol Use   Prescreen: >3 drinks/day or >7 drinks/week? No       Last PSA:   Prostate Specific Antigen Screen   Date Value Ref Range Status   01/13/2022 0.38 0.00 - 4.00 ug/L Final       Reviewed orders with patient. Reviewed health maintenance and updated orders accordingly - Yes      Reviewed and updated as needed this visit by clinical staff   Tobacco  Allergies  Meds  Problems  Med Hx  Surg Hx  Fam Hx          Reviewed and updated as needed this visit by Provider   Tobacco  Allergies  Meds  Problems  Med Hx  Surg Hx  Fam Hx        "      Review of Systems   Constitutional: Negative for chills and fever.   HENT: Negative for congestion, ear pain, hearing loss and sore throat.    Eyes: Negative for pain and visual disturbance.   Respiratory: Negative for cough and shortness of breath.    Cardiovascular: Negative for chest pain, palpitations and peripheral edema.   Gastrointestinal: Negative for abdominal pain, constipation, diarrhea, heartburn, hematochezia and nausea.   Genitourinary: Negative for dysuria, frequency, genital sores, hematuria, impotence, penile discharge and urgency.   Musculoskeletal: Negative for arthralgias, joint swelling and myalgias.   Skin: Negative for rash.   Neurological: Negative for dizziness, weakness, headaches and paresthesias.   Psychiatric/Behavioral: Negative for mood changes. The patient is not nervous/anxious.          OBJECTIVE:   /64 (BP Location: Right arm, Patient Position: Sitting, Cuff Size: Adult Large)   Pulse 89   Temp 97.2  F (36.2  C) (Temporal)   Resp 18   Ht 1.798 m (5' 10.79\")   Wt 136.8 kg (301 lb 9.6 oz)   SpO2 94%   BMI 42.32 kg/m      Physical Exam  GENERAL: healthy, alert and no distress  EYES: Eyes grossly normal to inspection, PERRL and conjunctivae and sclerae normal  HENT: ear canals and TM's normal, nose and mouth without ulcers or lesions  NECK: no adenopathy, no asymmetry, masses, or scars and thyroid normal to palpation  RESP: lungs clear to auscultation - no rales, rhonchi or wheezes  CV: regular rate and rhythm, normal S1 S2, no S3 or S4, no murmur, click or rub, no peripheral edema and peripheral pulses strong  ABDOMEN: soft, nontender, no hepatosplenomegaly, no masses and bowel sounds normal  MS: no gross musculoskeletal defects noted, no edema  SKIN: no suspicious lesions or rashes  NEURO: Normal strength and tone, mentation intact and speech normal  PSYCH: mentation appears normal, affect normal/bright    Diagnostic Test Results:  Labs reviewed in " Epic    ASSESSMENT/PLAN:   Aubrey was seen today for physical.    Diagnoses and all orders for this visit:    Routine general medical examination at a health care facility  Comments:     PSA: done today    Colon: Cologuard due, ordered    Discussed healthy lifestyle and aging recommendations including regular exercise, adequate and regular sleep, 5+ fruits and veggies daily.    Biometric forms received today at visit- will put in outgoing box in my Care Team to complete and fax once results are back.    Hypertension, benign essential, goal below 140/90  Comments:  Well controlled on lisionpril and hctz.  Refilled.  Check BMP today.  Orders:  -     hydrochlorothiazide (HYDRODIURIL) 25 MG tablet; Take 1 tablet (25 mg) by mouth daily  -     lisinopril (ZESTRIL) 40 MG tablet; Take 1 tablet (40 mg) by mouth daily    Lumbar facet arthropathy  -     methocarbamol (ROBAXIN) 750 MG tablet; Take 1 tablet (750 mg) by mouth 3 times daily as needed for muscle spasms    Chronic bilateral low back pain without sciatica  Comments:  Managed by neurosurg  Injections unsuccessful  Try PT (just referred by back speciali) and weight loss - if this doesn't help make appt w/me for pain mgment  Orders:  -     methocarbamol (ROBAXIN) 750 MG tablet; Take 1 tablet (750 mg) by mouth 3 times daily as needed for muscle spasms    Class 3 obesity (H)  Comments:  BMI 42 today   Severe back pain, see above  He's interested in bariatric surgery or medicines  Referred Wt Mgment  Orders:  -     Adult Comprehensive Weight Management  Referral; Future    Hyperlipidemia LDL goal <100  Comments:  ASCVD >5%  pt concerned about heart  Ordered CT Coronary Calcium test  Orders:  -     CT Coronary Calcium Scan; Future    Peripheral polyneuropathy  Comments:  Bilateral toes  Related to back? Versus diabtes or vit deficiency  Ordered labs today and will follow up based on results  Orders:  -     Hemoglobin A1c; Future  -     Vitamin B12; Future  -      "Hemoglobin A1c  -     Vitamin B12    Shortness of breath  Comments:  CXR today  Been ongoing; if normal may be deconditioning, discussed weight loss mgment and PT  Orders:  -     XR Chest 2 Views; Future    Onychia of toe, unspecified laterality  Comments:  Lamisil helped but returning  hold on re-treatment now given other health issues going on    Screen for colon cancer  -     COLOGUARD(EXACT SCIENCES); Future    Screening for prostate cancer  -     PROSTATE SPEC ANTIGEN SCREEN; Future  -     PROSTATE SPEC ANTIGEN SCREEN    Other orders  -     BASIC METABOLIC PANEL; Future  -     Albumin Random Urine Quantitative with Creat Ratio; Future  -     REVIEW OF HEALTH MAINTENANCE PROTOCOL ORDERS  -     BASIC METABOLIC PANEL  -     Albumin Random Urine Quantitative with Creat Ratio        Patient has been advised of split billing requirements and indicates understanding: Yes      COUNSELING:   Reviewed preventive health counseling, as reflected in patient instructions      BMI:   Estimated body mass index is 42.32 kg/m  as calculated from the following:    Height as of this encounter: 1.798 m (5' 10.79\").    Weight as of this encounter: 136.8 kg (301 lb 9.6 oz).   Weight management plan: Patient referred to endocrine and/or weight management specialty      He reports that he has never smoked. He has never used smokeless tobacco.          Anabel Abreu MD  [facility]  "

## 2023-04-13 LAB
ANION GAP SERPL CALCULATED.3IONS-SCNC: 15 MMOL/L (ref 7–15)
BUN SERPL-MCNC: 36.6 MG/DL (ref 6–20)
CALCIUM SERPL-MCNC: 9.6 MG/DL (ref 8.6–10)
CHLORIDE SERPL-SCNC: 102 MMOL/L (ref 98–107)
CHOLEST SERPL-MCNC: 200 MG/DL
CREAT SERPL-MCNC: 1.74 MG/DL (ref 0.67–1.17)
CREAT UR-MCNC: 225 MG/DL
DEPRECATED HCO3 PLAS-SCNC: 21 MMOL/L (ref 22–29)
GFR SERPL CREATININE-BSD FRML MDRD: 45 ML/MIN/1.73M2
GLUCOSE SERPL-MCNC: 97 MG/DL (ref 70–99)
HDLC SERPL-MCNC: 46 MG/DL
LDLC SERPL CALC-MCNC: 94 MG/DL
MICROALBUMIN UR-MCNC: <12 MG/L
MICROALBUMIN/CREAT UR: NORMAL MG/G{CREAT}
NONHDLC SERPL-MCNC: 154 MG/DL
POTASSIUM SERPL-SCNC: 5 MMOL/L (ref 3.4–5.3)
PSA SERPL DL<=0.01 NG/ML-MCNC: 0.34 NG/ML (ref 0–3.5)
SODIUM SERPL-SCNC: 138 MMOL/L (ref 136–145)
TRIGL SERPL-MCNC: 299 MG/DL
VIT B12 SERPL-MCNC: 170 PG/ML (ref 232–1245)

## 2023-04-14 ENCOUNTER — TELEPHONE (OUTPATIENT)
Dept: FAMILY MEDICINE | Facility: CLINIC | Age: 58
End: 2023-04-14
Payer: COMMERCIAL

## 2023-04-14 NOTE — TELEPHONE ENCOUNTER
Forms/Letter Request    Type of form/letter: Biometric Screening Form    Have you been seen for this request: Yes 04/13/23    Do we have the form/letter: Yes: Found in [place] outgoing [facility] CARE TEAM 1 w/ note from PCP to fill results. Results populated fully this AM.  Faxed to [phone]  Copy sent to abstract  Copy kept in clinic     Who is the form from? Patient    Where did/will the form come from? Patient or family brought in       When is form/letter needed by: N/A    How would you like the form/letter returned: Fax : [phone]

## 2023-04-17 ENCOUNTER — TELEPHONE (OUTPATIENT)
Dept: FAMILY MEDICINE | Facility: CLINIC | Age: 58
End: 2023-04-17
Payer: COMMERCIAL

## 2023-04-17 NOTE — TELEPHONE ENCOUNTER
----- Message from Anabel Abreu MD sent at 4/17/2023 11:00 AM CDT -----  Hi - Can you call Aubrey and get him in with me - okay to use [place] or Same Day.  Ideally within 2 wks.  I'm asking you to call in case he has questions about B12 or elevated creatinine (rather than TC team).  Thanks,  Dr. Anabel Abreu MD / [facility]        Aubrey:     A few important things with your lab results:     Your B12 is low and could be contributing to your fatigue and pain.  I'd like to start you on B12 injections to get you feeling better sooner - we can do that in clinic.  My nurses will reach out to schedule you.     You also had abnormal kidney tests - please avoid all NSAID medications such as ibuprofen, stay hydrated, and we'll recheck this when you come in for your visit for B12.     Finally, we should probably start you on a cholesterol medicine.  We can talk about that at your upcoming visit as well.     I hope you are having/had a good trip!     Any questions?  Let me know.     Best,  Dr. Anabel Abreu MD / [facility]   Written by Anabel Abreu MD on 4/17/2023 11:00 AM CDT

## 2023-04-17 NOTE — TELEPHONE ENCOUNTER
Writer called patient and reviewed message per Dr. Abreu.    Patient verbalized understanding and in agreement with plan.    In-person follow up visit scheduled with Dr. Abreu on 4/26/23.  Visit date, time and location confirmed with patient.    CASI Joshi, RN-BC  [facility]

## 2023-04-26 ENCOUNTER — OFFICE VISIT (OUTPATIENT)
Dept: FAMILY MEDICINE | Facility: CLINIC | Age: 58
End: 2023-04-26
Payer: COMMERCIAL

## 2023-04-26 VITALS
RESPIRATION RATE: 22 BRPM | HEIGHT: 70 IN | DIASTOLIC BLOOD PRESSURE: 73 MMHG | SYSTOLIC BLOOD PRESSURE: 109 MMHG | WEIGHT: 299 LBS | HEART RATE: 99 BPM | OXYGEN SATURATION: 93 % | BODY MASS INDEX: 42.8 KG/M2 | TEMPERATURE: 97.1 F

## 2023-04-26 DIAGNOSIS — E53.8 VITAMIN B12 DEFICIENCY (NON ANEMIC): Primary | ICD-10-CM

## 2023-04-26 DIAGNOSIS — E78.5 HYPERLIPIDEMIA LDL GOAL <100: ICD-10-CM

## 2023-04-26 DIAGNOSIS — R79.89 ELEVATED SERUM CREATININE: ICD-10-CM

## 2023-04-26 LAB
ANION GAP SERPL CALCULATED.3IONS-SCNC: 11 MMOL/L (ref 7–15)
BUN SERPL-MCNC: 26.7 MG/DL (ref 6–20)
CALCIUM SERPL-MCNC: 9.6 MG/DL (ref 8.6–10)
CHLORIDE SERPL-SCNC: 102 MMOL/L (ref 98–107)
CREAT SERPL-MCNC: 1.37 MG/DL (ref 0.67–1.17)
DEPRECATED HCO3 PLAS-SCNC: 24 MMOL/L (ref 22–29)
GFR SERPL CREATININE-BSD FRML MDRD: 60 ML/MIN/1.73M2
GLUCOSE SERPL-MCNC: 106 MG/DL (ref 70–99)
POTASSIUM SERPL-SCNC: 4.8 MMOL/L (ref 3.4–5.3)
SODIUM SERPL-SCNC: 137 MMOL/L (ref 136–145)

## 2023-04-26 PROCEDURE — 36415 COLL VENOUS BLD VENIPUNCTURE: CPT | Performed by: FAMILY MEDICINE

## 2023-04-26 PROCEDURE — 96372 THER/PROPH/DIAG INJ SC/IM: CPT | Performed by: FAMILY MEDICINE

## 2023-04-26 PROCEDURE — 99214 OFFICE O/P EST MOD 30 MIN: CPT | Mod: 25 | Performed by: FAMILY MEDICINE

## 2023-04-26 PROCEDURE — 80048 BASIC METABOLIC PNL TOTAL CA: CPT | Performed by: FAMILY MEDICINE

## 2023-04-26 RX ORDER — CYANOCOBALAMIN 1000 UG/ML
1000 INJECTION, SOLUTION INTRAMUSCULAR; SUBCUTANEOUS
Status: DISCONTINUED | OUTPATIENT
Start: 2023-04-26 | End: 2023-07-17

## 2023-04-26 RX ORDER — ATORVASTATIN CALCIUM 40 MG/1
40 TABLET, FILM COATED ORAL DAILY
Qty: 90 TABLET | Refills: 3 | Status: SHIPPED | OUTPATIENT
Start: 2023-04-26 | End: 2024-04-23

## 2023-04-26 RX ADMIN — CYANOCOBALAMIN 1000 MCG: 1000 INJECTION, SOLUTION INTRAMUSCULAR; SUBCUTANEOUS at 16:12

## 2023-04-26 NOTE — PROGRESS NOTES
Assessment & Plan     Aubrey was seen today for follow up and back pain.    Diagnoses and all orders for this visit:    Vitamin B12 deficiency (non anemic)  Comments:  Mom had as well  Eats full normal diet  Suspect possible pernicious anemia  Discussed - agreed will do b12 injections monthly and recheck labs  Follow up 1 galilea  Orders:  -     cyanocobalamin injection 1,000 mcg  -     Vitamin B12; Standing    Elevated serum creatinine  Comments:  Was taking aleve once a day  Now has stopped that  okay to use tylenol as needed for pain  Discussed increase water - alternate water and mt dew  Recheck today  Orders:  -     Basic metabolic panel  (Ca, Cl, CO2, Creat, Gluc, K, Na, BUN); Future    Hyperlipidemia LDL goal <100  Comments:  with elevated ASCVD score  start lipitor  recheck 3 months  Orders:  -     atorvastatin (LIPITOR) 40 MG tablet; Take 1 tablet (40 mg) by mouth daily    Other orders  -     PRIMARY CARE FOLLOW-UP SCHEDULING; Future        Anabel Abreu MD  [facility]   Aubrey is a 58 year old, presenting for the following health issues:  Follow Up (Labs/) and Back Pain        4/26/2023     3:24 PM   Additional Questions   Roomed by javier   Accompanied by wife     History of Present Illness       Back Pain:  He presents for follow up of back pain. Patient's back pain is a chronic problem.  Location of back pain:  Right lower back, left lower back, right middle of back, left middle of back, right upper back, left upper back, right hip and left hip  Description of back pain: burning, sharp and shooting  Back pain spreads: nowhere    Since patient first noticed back pain, pain is: gradually worsening  Does back pain interfere with his job:  Yes      Reason for visit:  Test follow up    He eats 0-1 servings of fruits and vegetables daily.He consumes 0 sweetened beverage(s) daily.He exercises with enough effort to increase his heart rate 9 or less minutes per day.  He  "exercises with enough effort to increase his heart rate 3 or less days per week.   He is taking medications regularly.     Mom - history of b12 deficiency  Doesn't take multivitamin    Also - set up for scan.    Kidney tests:  Takes 1 aleve a day for the last year  Nothing else  Drinks diet mt dew  Not much water          Review of Systems         Objective    /73 (BP Location: Right arm, Patient Position: Sitting, Cuff Size: Adult Large)   Pulse 99   Temp 97.1  F (36.2  C) (Temporal)   Resp 22   Ht 1.77 m (5' 9.69\")   Wt 135.6 kg (299 lb)   SpO2 93%   BMI 43.29 kg/m    Body mass index is 43.29 kg/m .  Physical Exam   GENERAL: healthy, alert and no distress    No results found for any visits on 04/26/23.  Office Visit on 04/12/2023   Component Date Value Ref Range Status     Sodium 04/12/2023 138  136 - 145 mmol/L Final     Potassium 04/12/2023 5.0  3.4 - 5.3 mmol/L Final     Chloride 04/12/2023 102  98 - 107 mmol/L Final     Carbon Dioxide (CO2) 04/12/2023 21 (L)  22 - 29 mmol/L Final     Anion Gap 04/12/2023 15  7 - 15 mmol/L Final     Urea Nitrogen 04/12/2023 36.6 (H)  6.0 - 20.0 mg/dL Final     Creatinine 04/12/2023 1.74 (H)  0.67 - 1.17 mg/dL Final     Calcium 04/12/2023 9.6  8.6 - 10.0 mg/dL Final     Glucose 04/12/2023 97  70 - 99 mg/dL Final     GFR Estimate 04/12/2023 45 (L)  >60 mL/min/1.73m2 Final    eGFR calculated using 2021 CKD-EPI equation.     Creatinine Urine mg/dL 04/12/2023 225.0  mg/dL Final    The reference ranges have not been established in urine creatinine. The results should be integrated into the clinical context for interpretation.     Albumin Urine mg/L 04/12/2023 <12.0  mg/L Final    The reference ranges have not been established in urine albumin. The results should be integrated into the clinical context for interpretation.     Albumin Urine mg/g Cr 04/12/2023    Final    Unable to calculate, urine albumin and/or urine creatinine is outside detectable " limits.  Microalbuminuria is defined as an albumin:creatinine ratio of 17 to 299 for males and 25 to 299 for females. A ratio of albumin:creatinine of 300 or higher is indicative of overt proteinuria.  Due to biologic variability, positive results should be confirmed by a second, first-morning random or 24-hour timed urine specimen. If there is discrepancy, a third specimen is recommended. When 2 out of 3 results are in the microalbuminuria range, this is evidence for incipient nephropathy and warrants increased efforts at glucose control, blood pressure control, and institution of therapy with an angiotensin-converting-enzyme (ACE) inhibitor (if the patient can tolerate it).       Prostate Specific Antigen Screen 04/12/2023 0.34  0.00 - 3.50 ng/mL Final     Hemoglobin A1C 04/12/2023 5.3  0.0 - 5.6 % Final    Normal <5.7%   Prediabetes 5.7-6.4%    Diabetes 6.5% or higher     Note: Adopted from ADA consensus guidelines.     Vitamin B12 04/12/2023 170 (L)  232 - 1,245 pg/mL Final     Cholesterol 04/12/2023 200 (H)  <200 mg/dL Final     Triglycerides 04/12/2023 299 (H)  <150 mg/dL Final     Direct Measure HDL 04/12/2023 46  >=40 mg/dL Final     LDL Cholesterol Calculated 04/12/2023 94  <=100 mg/dL Final     Non HDL Cholesterol 04/12/2023 154 (H)  <130 mg/dL Final

## 2023-06-02 ENCOUNTER — ALLIED HEALTH/NURSE VISIT (OUTPATIENT)
Dept: FAMILY MEDICINE | Facility: CLINIC | Age: 58
End: 2023-06-02
Payer: COMMERCIAL

## 2023-06-02 DIAGNOSIS — E53.8 VITAMIN B12 DEFICIENCY (NON ANEMIC): Primary | ICD-10-CM

## 2023-06-02 PROCEDURE — 96372 THER/PROPH/DIAG INJ SC/IM: CPT | Performed by: FAMILY MEDICINE

## 2023-06-02 PROCEDURE — 99207 PR NO CHARGE NURSE ONLY: CPT

## 2023-06-02 RX ADMIN — CYANOCOBALAMIN 1000 MCG: 1000 INJECTION, SOLUTION INTRAMUSCULAR; SUBCUTANEOUS at 15:18

## 2023-06-02 NOTE — PROGRESS NOTES
Clinic Administered Medication Documentation      Injectable Medication Documentation    Is there an active order (written within the past 365 days, with administrations remaining, not ) in the chart? Yes.     Patient was given Cyanocobalamin (B-12). Prior to medication administration, verified patient's identity using patient s name and date of birth. Please see MAR and medication order for additional information. Patient instructed to remain in clinic for 15 minutes and report any adverse reaction to staff immediately.    Vial/Syringe: Single dose vial. Was entire vial of medication used? Yes  Was this medication supplied by the patient? No  Is this a medication the patient will need to receive again? Yes. Verified that the patient has refills remaining in their prescription.    Hanna Aragon RN  [facility]

## 2023-06-03 DIAGNOSIS — N17.9 AKI (ACUTE KIDNEY INJURY) (H): Primary | ICD-10-CM

## 2023-06-03 DIAGNOSIS — R94.4 DECREASED GFR: ICD-10-CM

## 2023-06-05 ENCOUNTER — TELEPHONE (OUTPATIENT)
Dept: FAMILY MEDICINE | Facility: CLINIC | Age: 58
End: 2023-06-05
Payer: COMMERCIAL

## 2023-06-05 NOTE — TELEPHONE ENCOUNTER
----- Message from Anabel Abreu MD sent at 6/3/2023  4:09 PM CDT -----  Hi - I'm routing to you as a safety net; I've placed an ASAP referral for nephrology for this patient but could you be sure he's scheduled this week?  If we can't get him in I'll refer to [facility] instead.  Thanks,  Dr. Anabel Abreu MD / [facility]

## 2023-06-06 ENCOUNTER — PRE VISIT (OUTPATIENT)
Dept: NEPHROLOGY | Facility: CLINIC | Age: 58
End: 2023-06-06

## 2023-06-06 ENCOUNTER — OFFICE VISIT (OUTPATIENT)
Dept: NEPHROLOGY | Facility: CLINIC | Age: 58
End: 2023-06-06
Attending: INTERNAL MEDICINE
Payer: COMMERCIAL

## 2023-06-06 ENCOUNTER — LAB (OUTPATIENT)
Dept: LAB | Facility: CLINIC | Age: 58
End: 2023-06-06
Payer: COMMERCIAL

## 2023-06-06 VITALS
WEIGHT: 294 LBS | BODY MASS INDEX: 42.57 KG/M2 | DIASTOLIC BLOOD PRESSURE: 83 MMHG | OXYGEN SATURATION: 95 % | SYSTOLIC BLOOD PRESSURE: 127 MMHG | HEART RATE: 95 BPM

## 2023-06-06 DIAGNOSIS — E66.813 CLASS 3 SEVERE OBESITY DUE TO EXCESS CALORIES WITH BODY MASS INDEX (BMI) OF 40.0 TO 44.9 IN ADULT, UNSPECIFIED WHETHER SERIOUS COMORBIDITY PRESENT (H): ICD-10-CM

## 2023-06-06 DIAGNOSIS — E66.01 CLASS 3 SEVERE OBESITY DUE TO EXCESS CALORIES WITH BODY MASS INDEX (BMI) OF 40.0 TO 44.9 IN ADULT, UNSPECIFIED WHETHER SERIOUS COMORBIDITY PRESENT (H): ICD-10-CM

## 2023-06-06 DIAGNOSIS — R06.83 SNORING: ICD-10-CM

## 2023-06-06 DIAGNOSIS — N17.9 ACUTE KIDNEY INJURY SUPERIMPOSED ON CHRONIC KIDNEY DISEASE (H): ICD-10-CM

## 2023-06-06 DIAGNOSIS — N18.9 ACUTE KIDNEY INJURY SUPERIMPOSED ON CHRONIC KIDNEY DISEASE (H): ICD-10-CM

## 2023-06-06 DIAGNOSIS — N17.9 AKI (ACUTE KIDNEY INJURY) (H): Primary | ICD-10-CM

## 2023-06-06 DIAGNOSIS — I95.2 HYPOTENSION DUE TO DRUGS: ICD-10-CM

## 2023-06-06 DIAGNOSIS — N17.9 ACUTE KIDNEY INJURY (H): ICD-10-CM

## 2023-06-06 DIAGNOSIS — N17.9 ACUTE KIDNEY INJURY (H): Primary | ICD-10-CM

## 2023-06-06 DIAGNOSIS — I10 HYPERTENSION, ESSENTIAL: ICD-10-CM

## 2023-06-06 DIAGNOSIS — N18.2 CKD (CHRONIC KIDNEY DISEASE), STAGE II: ICD-10-CM

## 2023-06-06 LAB
ALBUMIN MFR UR ELPH: 8.1 MG/DL (ref 1–14)
ALBUMIN SERPL BCG-MCNC: 4.4 G/DL (ref 3.5–5.2)
ALBUMIN UR-MCNC: NEGATIVE MG/DL
ANION GAP SERPL CALCULATED.3IONS-SCNC: 10 MMOL/L (ref 7–15)
APPEARANCE UR: CLEAR
BILIRUB UR QL STRIP: NEGATIVE
BUN SERPL-MCNC: 29.3 MG/DL (ref 6–20)
CALCIUM SERPL-MCNC: 9.4 MG/DL (ref 8.6–10)
CHLORIDE SERPL-SCNC: 106 MMOL/L (ref 98–107)
COLOR UR AUTO: ABNORMAL
CREAT SERPL-MCNC: 1.4 MG/DL (ref 0.67–1.17)
CREAT UR-MCNC: 124 MG/DL
DEPRECATED HCO3 PLAS-SCNC: 22 MMOL/L (ref 22–29)
ERYTHROCYTE [DISTWIDTH] IN BLOOD BY AUTOMATED COUNT: 12.7 % (ref 10–15)
GFR SERPL CREATININE-BSD FRML MDRD: 59 ML/MIN/1.73M2
GLUCOSE SERPL-MCNC: 130 MG/DL (ref 70–99)
GLUCOSE UR STRIP-MCNC: NEGATIVE MG/DL
HCT VFR BLD AUTO: 39.2 % (ref 40–53)
HGB BLD-MCNC: 13.1 G/DL (ref 13.3–17.7)
HGB UR QL STRIP: NEGATIVE
HYALINE CASTS: 1 /LPF
KETONES UR STRIP-MCNC: NEGATIVE MG/DL
LEUKOCYTE ESTERASE UR QL STRIP: NEGATIVE
MCH RBC QN AUTO: 31.6 PG (ref 26.5–33)
MCHC RBC AUTO-ENTMCNC: 33.4 G/DL (ref 31.5–36.5)
MCV RBC AUTO: 95 FL (ref 78–100)
MUCOUS THREADS #/AREA URNS LPF: PRESENT /LPF
NITRATE UR QL: NEGATIVE
PH UR STRIP: 5 [PH] (ref 5–7)
PHOSPHATE SERPL-MCNC: 1.5 MG/DL (ref 2.5–4.5)
PLATELET # BLD AUTO: 287 10E3/UL (ref 150–450)
POTASSIUM SERPL-SCNC: 4.5 MMOL/L (ref 3.4–5.3)
PROT/CREAT 24H UR: 0.07 MG/MG CR (ref 0–0.2)
RBC # BLD AUTO: 4.14 10E6/UL (ref 4.4–5.9)
RBC URINE: 0 /HPF
SODIUM SERPL-SCNC: 138 MMOL/L (ref 136–145)
SP GR UR STRIP: 1.02 (ref 1–1.03)
SQUAMOUS EPITHELIAL: <1 /HPF
UROBILINOGEN UR STRIP-MCNC: NORMAL MG/DL
WBC # BLD AUTO: 6.7 10E3/UL (ref 4–11)
WBC URINE: 1 /HPF

## 2023-06-06 PROCEDURE — 81001 URINALYSIS AUTO W/SCOPE: CPT | Performed by: PATHOLOGY

## 2023-06-06 PROCEDURE — G0463 HOSPITAL OUTPT CLINIC VISIT: HCPCS | Performed by: INTERNAL MEDICINE

## 2023-06-06 PROCEDURE — 84156 ASSAY OF PROTEIN URINE: CPT | Performed by: PATHOLOGY

## 2023-06-06 PROCEDURE — 85027 COMPLETE CBC AUTOMATED: CPT | Performed by: PATHOLOGY

## 2023-06-06 PROCEDURE — 80069 RENAL FUNCTION PANEL: CPT | Performed by: PATHOLOGY

## 2023-06-06 PROCEDURE — 99205 OFFICE O/P NEW HI 60 MIN: CPT | Performed by: INTERNAL MEDICINE

## 2023-06-06 PROCEDURE — 36415 COLL VENOUS BLD VENIPUNCTURE: CPT | Performed by: PATHOLOGY

## 2023-06-06 RX ORDER — LISINOPRIL 40 MG/1
40 TABLET ORAL DAILY
COMMUNITY
End: 2023-07-11

## 2023-06-06 ASSESSMENT — PAIN SCALES - GENERAL: PAINLEVEL: NO PAIN (0)

## 2023-06-06 NOTE — PROGRESS NOTES
Call to patient, he was able to come in today for labs at 1030 and to see Dr Dockery for New KAZ.   Labs ordered.  Patient aware  Jessica Prabhakar LPN  Nephrology  [phone]

## 2023-06-06 NOTE — PROGRESS NOTES
Nephrology Clinic Note  June 6, 2023    I was asked to see this patient by Dr. Abreu    CC: KAZ on CKD    HPI: Aubrey Zavala is a 58 year old male with PMH significant for HTN, obesity, back pain s/p shots but chronic NSAID use who presents for evaluation and management of KAZ on CKD.    Pt endorsed feeling stable. No new symptoms today. Pt have had back issues more so than before and started to take aleve daily along with his antihypertensives. But his labs in April were higher than in jan 2022, so PCP asked to hold all NSAID's but continued on his antihypertensives.currently on tylenol only. He also endorsed that his is now feeling more stressed because of work lately. Pt endorsed that he is not checking BP at home but does have BP monitor. Up on questioning, did endorse snoring during the night and wake up not well rested for most of nights as well. Denied other systemic symptoms including recurrent fevers/chills, nausea/vomiting, diarrhea, abdominal pain, chest pain or SOB. Denied being sick since April.     - History of urinary symptoms: for couple years feels like stream of urine has gone down and usually urinate by sitting but feels like he is completely emptying the bladder.  - History of Hematuria: no  - Swelling: no  - Hx of UTIs: no  - Hx of stones: no  - Rashes/Joint pain: no  - Family hx of kidney disease: no  - NSAID use: was taking Aleve 220 mg once daily, last dose was a month ago, currently takes tylenol daily.       Allergies   Allergen Reactions     No Known Drug Allergy        atorvastatin (LIPITOR) 40 MG tablet, Take 1 tablet (40 mg) by mouth daily  lisinopril (ZESTRIL) 40 MG tablet, Take 40 mg by mouth daily  methocarbamol (ROBAXIN) 750 MG tablet, Take 1 tablet (750 mg) by mouth 3 times daily as needed for muscle spasms  hydrochlorothiazide (HYDRODIURIL) 25 MG tablet, Take 1 tablet (25 mg) by mouth daily (Patient not taking: Reported on 6/6/2023)    cyanocobalamin injection 1,000  mcg        Past Medical History:   Diagnosis Date     CARDIOVASCULAR SCREENING; LDL GOAL LESS THAN 130      Hypertension, benign essential, goal below 140/90 9/16/2016     Strain of lumbar region 6/22/2020     Unspecified essential hypertension        Past Surgical History:   Procedure Laterality Date     CLOSED RX METATARSAL FX  1978     HC TOOTH EXTRACTION W/FORCEP       INJECT JOINT SACROILIAC Bilateral 7/30/2021    Procedure: INJECTION, SACROILIAC JOINT;  Surgeon: Irvin Kelly MD;  Location:  OR       Social History     Tobacco Use     Smoking status: Never     Smokeless tobacco: Never   Vaping Use     Vaping status: Never Used   Substance Use Topics     Alcohol use: Yes     Comment: occ     Drug use: No       Family History   Problem Relation Age of Onset     Hypertension Mother      Coronary Artery Disease Father      Cancer Sister         MENs disease     Cancer Brother         MENs disease     Hypertension Brother        ROS: A 12 system review of systems was negative other than noted here or above.     Exam:  /83 (BP Location: Left arm, Patient Position: Sitting, Cuff Size: Adult Large)   Pulse 95   Wt 133.4 kg (294 lb)   SpO2 95%   BMI 42.57 kg/m      GENERAL APPEARANCE: alert and no distress  EYES: no scleral icterus  HENT: no gross abnormalities noted  NECK: supple, no adenopathy  RESP: lungs clear to auscultation   CV: regular rhythm, normal rate, no rub  Extremities: no clubbing, cyanosis, or edema  SKIN: no rash  NEURO: mentation intact and speech normal  PSYCH: affect normal/bright    Results:    Lab on 06/06/2023   Component Date Value Ref Range Status     Sodium 06/06/2023 138  136 - 145 mmol/L Final     Potassium 06/06/2023 4.5  3.4 - 5.3 mmol/L Final     Chloride 06/06/2023 106  98 - 107 mmol/L Final     Carbon Dioxide (CO2) 06/06/2023 22  22 - 29 mmol/L Final     Anion Gap 06/06/2023 10  7 - 15 mmol/L Final     Glucose 06/06/2023 130 (H)  70 - 99 mg/dL Final     Urea Nitrogen  06/06/2023 29.3 (H)  6.0 - 20.0 mg/dL Final     Creatinine 06/06/2023 1.40 (H)  0.67 - 1.17 mg/dL Final     GFR Estimate 06/06/2023 59 (L)  >60 mL/min/1.73m2 Final    eGFR calculated using 2021 CKD-EPI equation.     Calcium 06/06/2023 9.4  8.6 - 10.0 mg/dL Final     Albumin 06/06/2023 4.4  3.5 - 5.2 g/dL Final     Phosphorus 06/06/2023 1.5 (L)  2.5 - 4.5 mg/dL Final     WBC Count 06/06/2023 6.7  4.0 - 11.0 10e3/uL Final     RBC Count 06/06/2023 4.14 (L)  4.40 - 5.90 10e6/uL Final     Hemoglobin 06/06/2023 13.1 (L)  13.3 - 17.7 g/dL Final     Hematocrit 06/06/2023 39.2 (L)  40.0 - 53.0 % Final     MCV 06/06/2023 95  78 - 100 fL Final     MCH 06/06/2023 31.6  26.5 - 33.0 pg Final     MCHC 06/06/2023 33.4  31.5 - 36.5 g/dL Final     RDW 06/06/2023 12.7  10.0 - 15.0 % Final     Platelet Count 06/06/2023 287  150 - 450 10e3/uL Final     Color Urine 06/06/2023 Light Yellow  Colorless, Straw, Light Yellow, Yellow Final     Appearance Urine 06/06/2023 Clear  Clear Final     Glucose Urine 06/06/2023 Negative  Negative mg/dL Final     Bilirubin Urine 06/06/2023 Negative  Negative Final     Ketones Urine 06/06/2023 Negative  Negative mg/dL Final     Specific Gravity Urine 06/06/2023 1.020  1.003 - 1.035 Final     Blood Urine 06/06/2023 Negative  Negative Final     pH Urine 06/06/2023 5.0  5.0 - 7.0 Final     Protein Albumin Urine 06/06/2023 Negative  Negative mg/dL Final     Urobilinogen Urine 06/06/2023 Normal  Normal, 2.0 mg/dL Final     Nitrite Urine 06/06/2023 Negative  Negative Final     Leukocyte Esterase Urine 06/06/2023 Negative  Negative Final     Mucus Urine 06/06/2023 Present (A)  None Seen /LPF Final     RBC Urine 06/06/2023 0  <=2 /HPF Final     WBC Urine 06/06/2023 1  <=5 /HPF Final     Squamous Epithelials Urine 06/06/2023 <1  <=1 /HPF Final     Hyaline Casts Urine 06/06/2023 1  <=2 /LPF Final       Assessment/Plan:     Recurrent Non oliguric KAZ stage II on CKD stage II/IIIa  Pt with possible baseline  creatinine was 1.1-1.2 in 2022 presented with creatinine of 1.7 in April and repeat trended down to 1.3 but was elevated to 2 in June rasing concerns for recurrent KAZ. Pt continue to make good amount of urine. No GI losses. Possible hypotension based on recent clinic BP readings. Was on Aleve too which likely caused initial KAZ. Repeat today was back to 1.4 with eGFR of 59 ml which put him in CKD stage III. As there are no labs since jan 2022, likely he did develop CKD 2/2 long standing HTN, renovascular and or long standing NSAID use. KAZ as mentioned above, likely NSAID initially but hypotension was also playing role.  - discontinue all NSAID use   - hold both hydrochlorothiazide and lisinopril for now to give a break to his kidneys  - renal US to rule out MOHAN  - encouraged good hydration   - follow low salt diet  - check BP at home and if consistently > 140, will consider restart on lisinopril at lower dose vs start on coreg vs others    Hypertension :  Clinic BP is WNL today. Have had lower BP in April.  - hold his antihypertensives for now   - manage as above     Electrolytes :  Stable    BMD :  Hypophosphatemia, likely 2/2 diuretics use vs low phos intake   Encouraged to increase phos contained food. Instructions provided   Jonathon is WNL    Metabolic acidosis :  Bicarb is WNL    Anemia :  Hb is slightly low, will continue to monitor    Other problems  Snoring, ? LASHELL, need to further evaluate as it can cause CKD among others. Differ to PCP   Obesity, with pt having back issues, not able to walk or be physically active, he was planning on gastric bypass surgery when possible.  Back issues, following with specialists. Encouraged to stop all NSAID's    Total time spent on the day of clinic visit was 60 minutes including 40 minutes of face-to-face time with the patient and his wife over the phone, labs and image review, PCP note review and documentation as above.    Celeste Dockery  Dept of Nephrology and  Hypertension  [facility]

## 2023-06-06 NOTE — CONFIDENTIAL NOTE
DIAGNOSIS:    KAZ (acute kidney injury) (H)   Decreased GFR      DATE RECEIVED: 06.06.2023   NOTES STATUS DETAILS   OFFICE NOTE from referring provider Internal 06.03.2023 Anabel Abreu MD   OFFICE NOTE from other specialist      *Only VASCULITIS or LUPUS gather office notes for the following     *PULMONARY       *ENT     *DERMATOLOGY     *RHEUMATOLOGY     DISCHARGE SUMMARY from hospital     DISCHARGE REPORT from the ER     MEDICATION LIST Internal    IMAGING  (NEED IMAGES AND REPORTS)     KIDNEY CT SCAN     KIDNEY ULTRASOUND     MR ABDOMEN     NUCLEAR MEDICINE RENAL     LABS     CBC     CMP     BMP Internal 06.02.2023   UA     URINE PROTEIN     RENAL PANEL     BIOPSY     KIDNEY BIOPSY

## 2023-06-06 NOTE — PATIENT INSTRUCTIONS
Hold both hydrochlorothiazide and lisinopril  Completely stop Aleve and other NSAID's.  Increase fluid intake to 60 oz    Patient Education     Managing Your Phosphorus Intake  Diet  The following foods are high in phosphorus.   Milk and milk products (including chocolate milk, cheese, cottage cheese, yogurt, ice cream, custard and pudding)  Whole-grain breads, bran, whole-grain cereal, wheat germ, saravia s yeast  Legumes (dried or canned beans, baked beans, split peas, lentils), nuts, nut butters (like peanut butter), seeds  Chocolate and cocoa  Tofu

## 2023-06-06 NOTE — NURSING NOTE
Chief Complaint   Patient presents with     Consult     /83 (BP Location: Left arm, Patient Position: Sitting, Cuff Size: Adult Large)   Pulse 95   Wt 133.4 kg (294 lb)   SpO2 95%   BMI 42.57 kg/m

## 2023-06-06 NOTE — LETTER
6/6/2023       RE: Aubrey Zavala  [address]  [facility] [address]     Dear Colleague,    Thank you for referring your patient, Aubrey Zavala, to the [facility] [place] at [facility]. Please see a copy of my visit note below.      Nephrology Clinic Note  June 6, 2023    I was asked to see this patient by Dr. Abreu    CC: KAZ on CKD    HPI: Aubrey Zavala is a 58 year old male with PMH significant for HTN, obesity, back pain s/p shots but chronic NSAID use who presents for evaluation and management of KAZ on CKD.    Pt endorsed feeling stable. No new symptoms today. Pt have had back issues more so than before and started to take aleve daily along with his antihypertensives. But his labs in April were higher than in jan 2022, so PCP asked to hold all NSAID's but continued on his antihypertensives.currently on tylenol only. He also endorsed that his is now feeling more stressed because of work lately. Pt endorsed that he is not checking BP at home but does have BP monitor. Up on questioning, did endorse snoring during the night and wake up not well rested for most of nights as well. Denied other systemic symptoms including recurrent fevers/chills, nausea/vomiting, diarrhea, abdominal pain, chest pain or SOB. Denied being sick since April.     - History of urinary symptoms: for couple years feels like stream of urine has gone down and usually urinate by sitting but feels like he is completely emptying the bladder.  - History of Hematuria: no  - Swelling: no  - Hx of UTIs: no  - Hx of stones: no  - Rashes/Joint pain: no  - Family hx of kidney disease: no  - NSAID use: was taking Aleve 220 mg once daily, last dose was a month ago, currently takes tylenol daily.       Allergies   Allergen Reactions    No Known Drug Allergy        atorvastatin (LIPITOR) 40 MG tablet, Take 1 tablet (40 mg) by mouth daily  lisinopril (ZESTRIL) 40 MG  tablet, Take 40 mg by mouth daily  methocarbamol (ROBAXIN) 750 MG tablet, Take 1 tablet (750 mg) by mouth 3 times daily as needed for muscle spasms  hydrochlorothiazide (HYDRODIURIL) 25 MG tablet, Take 1 tablet (25 mg) by mouth daily (Patient not taking: Reported on 6/6/2023)    cyanocobalamin injection 1,000 mcg        Past Medical History:   Diagnosis Date    CARDIOVASCULAR SCREENING; LDL GOAL LESS THAN 130     Hypertension, benign essential, goal below 140/90 9/16/2016    Strain of lumbar region 6/22/2020    Unspecified essential hypertension        Past Surgical History:   Procedure Laterality Date    CLOSED RX METATARSAL FX  1978    HC TOOTH EXTRACTION W/FORCEP      INJECT JOINT SACROILIAC Bilateral 7/30/2021    Procedure: INJECTION, SACROILIAC JOINT;  Surgeon: Irvin Kelly MD;  Location:  OR       Social History     Tobacco Use    Smoking status: Never    Smokeless tobacco: Never   Vaping Use    Vaping status: Never Used   Substance Use Topics    Alcohol use: Yes     Comment: occ    Drug use: No       Family History   Problem Relation Age of Onset    Hypertension Mother     Coronary Artery Disease Father     Cancer Sister         MENs disease    Cancer Brother         MENs disease    Hypertension Brother        ROS: A 12 system review of systems was negative other than noted here or above.     Exam:  /83 (BP Location: Left arm, Patient Position: Sitting, Cuff Size: Adult Large)   Pulse 95   Wt 133.4 kg (294 lb)   SpO2 95%   BMI 42.57 kg/m      GENERAL APPEARANCE: alert and no distress  EYES: no scleral icterus  HENT: no gross abnormalities noted  NECK: supple, no adenopathy  RESP: lungs clear to auscultation   CV: regular rhythm, normal rate, no rub  Extremities: no clubbing, cyanosis, or edema  SKIN: no rash  NEURO: mentation intact and speech normal  PSYCH: affect normal/bright    Results:    Lab on 06/06/2023   Component Date Value Ref Range Status    Sodium 06/06/2023 138  136 - 145 mmol/L  Final    Potassium 06/06/2023 4.5  3.4 - 5.3 mmol/L Final    Chloride 06/06/2023 106  98 - 107 mmol/L Final    Carbon Dioxide (CO2) 06/06/2023 22  22 - 29 mmol/L Final    Anion Gap 06/06/2023 10  7 - 15 mmol/L Final    Glucose 06/06/2023 130 (H)  70 - 99 mg/dL Final    Urea Nitrogen 06/06/2023 29.3 (H)  6.0 - 20.0 mg/dL Final    Creatinine 06/06/2023 1.40 (H)  0.67 - 1.17 mg/dL Final    GFR Estimate 06/06/2023 59 (L)  >60 mL/min/1.73m2 Final    eGFR calculated using 2021 CKD-EPI equation.    Calcium 06/06/2023 9.4  8.6 - 10.0 mg/dL Final    Albumin 06/06/2023 4.4  3.5 - 5.2 g/dL Final    Phosphorus 06/06/2023 1.5 (L)  2.5 - 4.5 mg/dL Final    WBC Count 06/06/2023 6.7  4.0 - 11.0 10e3/uL Final    RBC Count 06/06/2023 4.14 (L)  4.40 - 5.90 10e6/uL Final    Hemoglobin 06/06/2023 13.1 (L)  13.3 - 17.7 g/dL Final    Hematocrit 06/06/2023 39.2 (L)  40.0 - 53.0 % Final    MCV 06/06/2023 95  78 - 100 fL Final    MCH 06/06/2023 31.6  26.5 - 33.0 pg Final    MCHC 06/06/2023 33.4  31.5 - 36.5 g/dL Final    RDW 06/06/2023 12.7  10.0 - 15.0 % Final    Platelet Count 06/06/2023 287  150 - 450 10e3/uL Final    Color Urine 06/06/2023 Light Yellow  Colorless, Straw, Light Yellow, Yellow Final    Appearance Urine 06/06/2023 Clear  Clear Final    Glucose Urine 06/06/2023 Negative  Negative mg/dL Final    Bilirubin Urine 06/06/2023 Negative  Negative Final    Ketones Urine 06/06/2023 Negative  Negative mg/dL Final    Specific Gravity Urine 06/06/2023 1.020  1.003 - 1.035 Final    Blood Urine 06/06/2023 Negative  Negative Final    pH Urine 06/06/2023 5.0  5.0 - 7.0 Final    Protein Albumin Urine 06/06/2023 Negative  Negative mg/dL Final    Urobilinogen Urine 06/06/2023 Normal  Normal, 2.0 mg/dL Final    Nitrite Urine 06/06/2023 Negative  Negative Final    Leukocyte Esterase Urine 06/06/2023 Negative  Negative Final    Mucus Urine 06/06/2023 Present (A)  None Seen /LPF Final    RBC Urine 06/06/2023 0  <=2 /HPF Final    WBC Urine  06/06/2023 1  <=5 /HPF Final    Squamous Epithelials Urine 06/06/2023 <1  <=1 /HPF Final    Hyaline Casts Urine 06/06/2023 1  <=2 /LPF Final       Assessment/Plan:     Recurrent Non oliguric KAZ stage II on CKD stage II/IIIa  Pt with possible baseline creatinine was 1.1-1.2 in 2022 presented with creatinine of 1.7 in April and repeat trended down to 1.3 but was elevated to 2 in June rasing concerns for recurrent KAZ. Pt continue to make good amount of urine. No GI losses. Possible hypotension based on recent clinic BP readings. Was on Aleve too which likely caused initial KAZ. Repeat today was back to 1.4 with eGFR of 59 ml which put him in CKD stage III. As there are no labs since jan 2022, likely he did develop CKD 2/2 long standing HTN, renovascular and or long standing NSAID use. KAZ as mentioned above, likely NSAID initially but hypotension was also playing role.  - discontinue all NSAID use   - hold both hydrochlorothiazide and lisinopril for now to give a break to his kidneys  - renal US to rule out MOHAN  - encouraged good hydration   - follow low salt diet  - check BP at home and if consistently > 140, will consider restart on lisinopril at lower dose vs start on coreg vs others    Hypertension :  Clinic BP is WNL today. Have had lower BP in April.  - hold his antihypertensives for now   - manage as above     Electrolytes :  Stable    BMD :  Hypophosphatemia, likely 2/2 diuretics use vs low phos intake   Encouraged to increase phos contained food. Instructions provided   Jonathon is WNL    Metabolic acidosis :  Bicarb is WNL    Anemia :  Hb is slightly low, will continue to monitor    Other problems  Snoring, ? LASHELL, need to further evaluate as it can cause CKD among others. Differ to PCP   Obesity, with pt having back issues, not able to walk or be physically active, he was planning on gastric bypass surgery when possible.  Back issues, following with specialists. Encouraged to stop all NSAID's    Total time spent  on the day of clinic visit was 60 minutes including 40 minutes of face-to-face time with the patient and his wife over the phone, labs and image review, PCP note review and documentation as above.    Celeste Dockery  Dept of Nephrology and Hypertension  [facility]        Again, thank you for allowing me to participate in the care of your patient.      Sincerely,    Celeste Dockery MD

## 2023-06-08 ENCOUNTER — HOSPITAL ENCOUNTER (OUTPATIENT)
Dept: ULTRASOUND IMAGING | Facility: CLINIC | Age: 58
Discharge: HOME | End: 2023-06-08
Attending: INTERNAL MEDICINE | Admitting: INTERNAL MEDICINE
Payer: COMMERCIAL

## 2023-06-08 DIAGNOSIS — N17.9 AKI (ACUTE KIDNEY INJURY) (H): ICD-10-CM

## 2023-06-08 PROCEDURE — 93975 VASCULAR STUDY: CPT

## 2023-06-30 DIAGNOSIS — N17.9 AKI (ACUTE KIDNEY INJURY) (H): Primary | ICD-10-CM

## 2023-07-10 ENCOUNTER — LAB (OUTPATIENT)
Dept: LAB | Facility: CLINIC | Age: 58
End: 2023-07-10
Attending: INTERNAL MEDICINE
Payer: COMMERCIAL

## 2023-07-10 DIAGNOSIS — N17.9 AKI (ACUTE KIDNEY INJURY) (H): ICD-10-CM

## 2023-07-10 DIAGNOSIS — E53.8 VITAMIN B12 DEFICIENCY (NON ANEMIC): ICD-10-CM

## 2023-07-10 LAB
ALBUMIN SERPL BCG-MCNC: 4.6 G/DL (ref 3.5–5.2)
ALBUMIN UR-MCNC: NEGATIVE MG/DL
ANION GAP SERPL CALCULATED.3IONS-SCNC: 13 MMOL/L (ref 7–15)
APPEARANCE UR: CLEAR
BACTERIA #/AREA URNS HPF: ABNORMAL /HPF
BILIRUB UR QL STRIP: NEGATIVE
BUN SERPL-MCNC: 15.4 MG/DL (ref 6–20)
CALCIUM SERPL-MCNC: 9.1 MG/DL (ref 8.6–10)
CHLORIDE SERPL-SCNC: 106 MMOL/L (ref 98–107)
COLOR UR AUTO: YELLOW
CREAT SERPL-MCNC: 1.09 MG/DL (ref 0.67–1.17)
CREAT UR-MCNC: 196 MG/DL
DEPRECATED HCO3 PLAS-SCNC: 24 MMOL/L (ref 22–29)
ERYTHROCYTE [DISTWIDTH] IN BLOOD BY AUTOMATED COUNT: 13.1 % (ref 10–15)
GFR SERPL CREATININE-BSD FRML MDRD: 79 ML/MIN/1.73M2
GLUCOSE SERPL-MCNC: 102 MG/DL (ref 70–99)
GLUCOSE UR STRIP-MCNC: NEGATIVE MG/DL
HCT VFR BLD AUTO: 40.3 % (ref 40–53)
HGB BLD-MCNC: 13 G/DL (ref 13.3–17.7)
HGB UR QL STRIP: NEGATIVE
KETONES UR STRIP-MCNC: NEGATIVE MG/DL
LEUKOCYTE ESTERASE UR QL STRIP: NEGATIVE
MCH RBC QN AUTO: 31 PG (ref 26.5–33)
MCHC RBC AUTO-ENTMCNC: 32.3 G/DL (ref 31.5–36.5)
MCV RBC AUTO: 96 FL (ref 78–100)
MICROALBUMIN UR-MCNC: <12 MG/L
MICROALBUMIN/CREAT UR: NORMAL MG/G{CREAT}
NITRATE UR QL: NEGATIVE
PH UR STRIP: 5.5 [PH] (ref 5–7)
PHOSPHATE SERPL-MCNC: 2.4 MG/DL (ref 2.5–4.5)
PLATELET # BLD AUTO: 329 10E3/UL (ref 150–450)
POTASSIUM SERPL-SCNC: 4 MMOL/L (ref 3.4–5.3)
RBC # BLD AUTO: 4.2 10E6/UL (ref 4.4–5.9)
RBC #/AREA URNS AUTO: ABNORMAL /HPF
SODIUM SERPL-SCNC: 143 MMOL/L (ref 136–145)
SP GR UR STRIP: >=1.03 (ref 1–1.03)
SQUAMOUS #/AREA URNS AUTO: ABNORMAL /LPF
UROBILINOGEN UR STRIP-ACNC: 1 E.U./DL
VIT B12 SERPL-MCNC: 219 PG/ML (ref 232–1245)
WBC # BLD AUTO: 7.7 10E3/UL (ref 4–11)
WBC #/AREA URNS AUTO: ABNORMAL /HPF

## 2023-07-10 PROCEDURE — 80069 RENAL FUNCTION PANEL: CPT

## 2023-07-10 PROCEDURE — 82570 ASSAY OF URINE CREATININE: CPT

## 2023-07-10 PROCEDURE — 85027 COMPLETE CBC AUTOMATED: CPT

## 2023-07-10 PROCEDURE — 36415 COLL VENOUS BLD VENIPUNCTURE: CPT

## 2023-07-10 PROCEDURE — 81001 URINALYSIS AUTO W/SCOPE: CPT

## 2023-07-10 PROCEDURE — 82043 UR ALBUMIN QUANTITATIVE: CPT

## 2023-07-10 PROCEDURE — 82607 VITAMIN B-12: CPT

## 2023-07-11 ENCOUNTER — OFFICE VISIT (OUTPATIENT)
Dept: NEPHROLOGY | Facility: CLINIC | Age: 58
End: 2023-07-11
Attending: INTERNAL MEDICINE
Payer: COMMERCIAL

## 2023-07-11 ENCOUNTER — ALLIED HEALTH/NURSE VISIT (OUTPATIENT)
Dept: FAMILY MEDICINE | Facility: CLINIC | Age: 58
End: 2023-07-11
Payer: COMMERCIAL

## 2023-07-11 VITALS
WEIGHT: 295 LBS | BODY MASS INDEX: 41.3 KG/M2 | HEART RATE: 81 BPM | HEIGHT: 71 IN | SYSTOLIC BLOOD PRESSURE: 155 MMHG | DIASTOLIC BLOOD PRESSURE: 86 MMHG | RESPIRATION RATE: 18 BRPM | OXYGEN SATURATION: 96 %

## 2023-07-11 DIAGNOSIS — I10 HYPERTENSION, ESSENTIAL: Primary | ICD-10-CM

## 2023-07-11 DIAGNOSIS — E53.8 VITAMIN B12 DEFICIENCY (NON ANEMIC): Primary | ICD-10-CM

## 2023-07-11 PROCEDURE — 99207 PR NO CHARGE NURSE ONLY: CPT

## 2023-07-11 PROCEDURE — 99214 OFFICE O/P EST MOD 30 MIN: CPT | Performed by: INTERNAL MEDICINE

## 2023-07-11 PROCEDURE — 96372 THER/PROPH/DIAG INJ SC/IM: CPT | Performed by: FAMILY MEDICINE

## 2023-07-11 RX ADMIN — CYANOCOBALAMIN 1000 MCG: 1000 INJECTION, SOLUTION INTRAMUSCULAR; SUBCUTANEOUS at 10:45

## 2023-07-11 ASSESSMENT — PAIN SCALES - GENERAL: PAINLEVEL: MODERATE PAIN (4)

## 2023-07-11 NOTE — PROGRESS NOTES
Nephrology Outpatient Visit Note    Chief Complain/Reason for Visit  Recent episode of KAZ    History of Present Illness  This is a 58 year old male who presents for follow-up appointment.  Please see my colleague 's note dated 6/6/2023 for details.  Briefly, he presented last month for evaluation of acute kidney injury.  His kidney function fluctuated quite a bit since April.  He has been taking an Aleve daily which she stopped back in April.  His antihypertensive including his hydrochlorothiazide and lisinopril were discontinued last month.    Overall, the patient is doing well today.  No acute symptoms reported today.  No chest pain or difficulty breathing.  He is able to empty his bladder well.  His laboratory investigation shows resolution of acute kidney injury and normalization of serum creatinine.  His serum creatinine is 1 mg/dL.  Electrolytes are normal.  He had a renal ultrasound last month which was negative for renal artery stenosis.  He does have a complex cyst/mass in the left kidney.  His urinalysis shows microscopic hematuria.  However, is negative for proteinuria.    The following portions of the patient's history were reviewed and updated as appropriate: allergies, current medications, past family history, past medical history, past social history, past surgical history and problem list.    Renal ultrasound 6/8  1.  No evidence of a hemodynamically significant renal artery  stenosis.  2.  There is a rounded hypoechoic structure in the left mid pole renal  cortex measuring 2.1 x 1.7 x 1.8 cm. Finding may represent a dromedary  hump or proteinaceous cystic structure however cannot rule out a solid  mass. Recommend CT renal mass protocol for further evaluation.  3.  Incidentally noted hepatic steatosis.      Review of Systems  A comprehensive review of systems was performed. Pertinent positives and negatives are described above.    Social and Family History  Patient reports that he has  "never smoked. He has never used smokeless tobacco. He reports current alcohol use. He reports that he does not use drugs.  family history includes Cancer in his brother and sister; Coronary Artery Disease in his father; Hypertension in his brother and mother.    Vital Signs  Blood pressure (!) 155/86, pulse 81, resp. rate 18, height 1.798 m (5' 10.79\"), weight 133.8 kg (295 lb), SpO2 96 %. Body mass index is 41.39 kg/m .    Physical Examination    Objective   I reviewed pertinent laboratory workup and imaging findings.  Lab Results   Component Value Date    WBC 7.7 07/10/2023    HGB 13.0 (L) 07/10/2023    HCT 40.3 07/10/2023    MCV 96 07/10/2023     07/10/2023     07/10/2023    BUN 15.4 07/10/2023    ANIONGAP 13 07/10/2023    ALBUMIN 4.6 07/10/2023     Lab Results   Component Value Date    UROBILINOGEN 1.0 07/10/2023     Current Outpatient Medications   Medication     atorvastatin (LIPITOR) 40 MG tablet     hydrochlorothiazide (HYDRODIURIL) 25 MG tablet     Current Facility-Administered Medications   Medication     cyanocobalamin injection 1,000 mcg     Assessment & Plan   Patient Active Problem List   Diagnosis     Hypertension, benign essential, goal below 140/90     BMI >35 with HTN (H)     Sacroiliac joint pain     Prediabetes     Vitamin B12 deficiency (non anemic)     Elevated serum creatinine     Hyperlipidemia LDL goal <100     The patient had acute kidney injury likely due to the combination of NSAIDs use as well as antihypertensive medications.  His KAZ has resolved.  However, he is hypertensive.  I instructed the patient to go back on his hydrochlorothiazide, and I would like to to check his BMP 1 month afterwards.  I instructed him to check his blood pressure daily and to call our office if his blood pressure remains high.  At that point, I would be reasonable to introduce a second medication.  Given his recent KAZ, we should consider adding amlodipine if we need a second agent.    Given the " left renal cyst/mass, microscopic hematuria, I think urological evaluation is indicated and I made the referral.  All questions were answered.  Would like to see the patient back in 3 to 6 months for routine visit.    My recommendations are the following:  - Resume hydrochlorothiazide  - Check your blood pressure at home. Goal blood pressure is 130/80. Call my office if your blood pressure readings are running higher than that.  - Urology referral given microscopic hematuria and left renal complex cyst  - Check BMP in one month  - Return to Nephrology Clinic in 3-6 months to review your progress. Check a renal panel prior to the appointment    Total time was of this encounter on the date of service was 30 minutes. All questions were answered to the patient's satisfaction.  Chart documentation was completed, in part, with [employer] voice-recognition software. Even though reviewed, some grammatical, spelling, and word errors may remain.    Orders placed today:  Orders Placed This Encounter   Procedures     Basic metabolic panel     Adult Urology  Referral

## 2023-07-11 NOTE — PATIENT INSTRUCTIONS
It was a pleasure taking care of you today.  I've included a brief summary of our discussion and care plan from today's visit below.  Please review this information with your primary care provider.    My recommendations are summarized as follows:  - Resume hydrochlorothiazide  - Check your blood pressure at home. Goal blood pressure is 130/80. Call my office if your blood pressure readings are running higher than that.  - Urology referral   - Check BMP in one month  - Return to Nephrology Clinic in 3-6 months to review your progress. Check a renal panel prior to the appointment    Who do I call with any questions after my visit?  Please be in touch if there are any further questions that arise following today's visit.  There are multiple ways to contact your nephrology care team.      During business hours, you may reach your Nephrology RN Care Coordinator, Zainab Gomez at . To schedule or reschedule an appointment, please call [phone]. To schedule imaging please call [phone]. To schedule your lab appointment at [address] lab call [phone].    You can always send a secure message through [employer].  [employer] messages are answered by your nurse or doctor typically within 24 hours.  Please allow extra time on weekends and holidays.      For urgent/emergent questions after business hours, you may reach the on-call Nephrology Fellow by contacting the [facility]  at [phone].     How will I get the results of any tests ordered?    You will receive all of your results.  If you have signed up for [employer], any tests ordered at your visit will be available to you after your physician reviews them.  Typically this takes 1-2 weeks.  If there are urgent results that require a change in your care plan, your physician or nurse will call you to discuss the next steps.      What is [employer]?  [employer] is a secure way for you to access all of your healthcare records  from the [facility].  It is a web based computer program, so you can sign on to it from any location.  It also allows you to send secure messages to your care team.  I recommend signing up for [employer] access if you have not already done so and are comfortable with using a computer.      How do I schedule a follow-up visit?  If you did not schedule a follow-up visit today, please call [phone] to schedule a follow-up office visit.      Sincerely,    Donald Acosta MD  [facility]  Division of Nephrology and Hypertension

## 2023-07-11 NOTE — NURSING NOTE
"Chief Complaint   Patient presents with     RECHECK     KAZ (acute kidney injury) (H) +6 more       Vitals:    07/11/23 0958   BP: (!) 155/86   BP Location: Left arm   Patient Position: Sitting   Cuff Size: Adult Large   Pulse: 81   Resp: 18   SpO2: 96%   Weight: 133.8 kg (295 lb)   Height: 1.798 m (5' 10.79\")       Body mass index is 41.39 kg/m .    Britni Gill, ICVF  "

## 2023-07-17 ENCOUNTER — TELEPHONE (OUTPATIENT)
Dept: FAMILY MEDICINE | Facility: CLINIC | Age: 58
End: 2023-07-17
Payer: COMMERCIAL

## 2023-07-17 DIAGNOSIS — E53.8 VITAMIN B12 DEFICIENCY (NON ANEMIC): Primary | ICD-10-CM

## 2023-07-17 DIAGNOSIS — E53.8 VITAMIN B12 DEFICIENCY (NON ANEMIC): ICD-10-CM

## 2023-07-17 RX ORDER — CYANOCOBALAMIN 1000 UG/ML
1 INJECTION, SOLUTION INTRAMUSCULAR; SUBCUTANEOUS
Qty: 10 ML | Refills: 0 | Status: SHIPPED | OUTPATIENT
Start: 2023-07-17 | End: 2023-10-09

## 2023-07-17 RX ORDER — BLOOD PRESSURE TEST KIT
1 KIT MISCELLANEOUS
Qty: 100 EACH | Refills: 0 | Status: SHIPPED | OUTPATIENT
Start: 2023-07-17

## 2023-07-17 RX ORDER — CYANOCOBALAMIN 1000 UG/ML
1000 INJECTION, SOLUTION INTRAMUSCULAR; SUBCUTANEOUS
Status: ACTIVE | OUTPATIENT
Start: 2023-07-25 | End: 2023-11-28

## 2023-07-17 RX ORDER — SYRINGE W-NEEDLE,DISPOSAB,3 ML 25GX5/8"
1 SYRINGE, EMPTY DISPOSABLE MISCELLANEOUS
Qty: 50 EACH | Refills: 0 | Status: SHIPPED | OUTPATIENT
Start: 2023-07-17 | End: 2023-10-13

## 2023-07-17 NOTE — TELEPHONE ENCOUNTER
"----- Message from Anabel Abreu MD sent at 7/17/2023 12:26 PM CDT -----  See message below - patient needs every 2 wks appointments for B12 injections OR a teaching visit to be able to do B12 injections at home (and then I'll need to send that prescription to his pharmacy).    Lab only in 6 wks.    Dr. Anabel Abreu MD / [facility]       Tobin Burgos:     Your B12 is only marginally better.     Let's increase the frequency of your injections to every 2 weeks, and recheck your B12 level lab in ~6 weeks.  Would you like to change it so you can do these injections yourself at home?  If so - my nurse can teach you how and I can send this to your pharmacy.  If not you can continue to schedule \"nurse only\" appointments here every 2 weeks.     Best,  Dr. Anabel Abreu MD / [facility]   Written by Anabel Abreu MD on 7/17/2023 12:26 PM CDT  Seen by patient Aubrey VILLA Sally on 7/17/2023 12:33 PM      "

## 2023-07-17 NOTE — TELEPHONE ENCOUNTER
Dr. Abreu-Please review, sign if agree and may close encounter.    Writer called patient who stated he plans to self-inject.  RN visit already scheduled for 7/25/23.    Confirmed pharmacy with patient and instructed patient to bring Vitamin B-12 injection/supplies to RN visit.  The RN will teach patient how to administer.    Patient verbalized understanding and in agreement with plan.    Thank you!  CASI Joshi, RN-[facility]

## 2023-07-25 ENCOUNTER — ALLIED HEALTH/NURSE VISIT (OUTPATIENT)
Dept: FAMILY MEDICINE | Facility: CLINIC | Age: 58
End: 2023-07-25
Payer: COMMERCIAL

## 2023-07-25 DIAGNOSIS — E53.8 VITAMIN B12 DEFICIENCY (NON ANEMIC): Primary | ICD-10-CM

## 2023-07-25 PROCEDURE — 99207 PR NO CHARGE NURSE ONLY: CPT

## 2023-07-25 NOTE — PROGRESS NOTES
Did teaching for the Vit B 12 IM administrating every 2 weeks.  PT will administer in the leg vastus lateralis muscle.  We reviewed sterile injection preparing.   Uncapping the med.  Alcohol swab the top.  Drawing up the medicine in the syringe.  Getting air out of the needle.    Cleaning the injection site.   Using landmarks.  Rotating the leg with each injection.    Pt will push the entire needle into the muscle before injecting the med.  I supervised pt do this administration.    We did practice drawing up saline with a needle and syringe.  Reviewed written materials given to pt about the injection site and drawing up the med.  Answered all questions.  PT voiced confidence to self administer IM injection every 2 weeks.  MELI French

## 2023-07-31 LAB — NONINV COLON CA DNA+OCC BLD SCRN STL QL: NEGATIVE

## 2023-08-03 ENCOUNTER — OFFICE VISIT (OUTPATIENT)
Dept: UROLOGY | Facility: CLINIC | Age: 58
End: 2023-08-03
Payer: COMMERCIAL

## 2023-08-03 VITALS
HEART RATE: 79 BPM | HEIGHT: 70 IN | WEIGHT: 295 LBS | SYSTOLIC BLOOD PRESSURE: 127 MMHG | DIASTOLIC BLOOD PRESSURE: 82 MMHG | OXYGEN SATURATION: 94 % | BODY MASS INDEX: 42.23 KG/M2

## 2023-08-03 DIAGNOSIS — I10 HYPERTENSION, ESSENTIAL: ICD-10-CM

## 2023-08-03 DIAGNOSIS — N28.89 LEFT RENAL MASS: Primary | ICD-10-CM

## 2023-08-03 DIAGNOSIS — R31.29 MICROSCOPIC HEMATURIA: ICD-10-CM

## 2023-08-03 PROCEDURE — 99204 OFFICE O/P NEW MOD 45 MIN: CPT | Performed by: UROLOGY

## 2023-08-03 ASSESSMENT — PAIN SCALES - GENERAL: PAINLEVEL: MODERATE PAIN (5)

## 2023-08-03 NOTE — PROGRESS NOTES
Chief Complaint:   Left Renal Mass  Microscopic hematuria         Consult or Referral:     Aubrey Zavala is a 58 year old male seen at the request of Dr. Acosta.         History of Present Illness:    Aubrey Zavala is a very pleasant 58 year old male who presents with a history of left renal mass. History of hypertension and had ultrasound done to assess for renal artery stenosis. Also had some transient KAZ, but now improved with change in blood pressure meds. On ultrasound, was noted to have renal lesion in the left mid pole.     He also had microscopic hematuria on most recent UA. No history of gross hematuria. Asymptomatic at that time. No history of smoking. Family does have history of MEN syndrome, but appears Aubrey does not have it.     PSA 0.34    Renal ultrasound 6/8/2023  IMPRESSION:  1.  No evidence of a hemodynamically significant renal artery  stenosis.  2.  There is a rounded hypoechoic structure in the left mid pole renal  cortex measuring 2.1 x 1.7 x 1.8 cm. Finding may represent a dromedary  hump or proteinaceous cystic structure however cannot rule out a solid  mass. Recommend CT renal mass protocol for further evaluation.  3.  Incidentally noted hepatic steatosis.         Past Medical History:     Past Medical History:   Diagnosis Date    CARDIOVASCULAR SCREENING; LDL GOAL LESS THAN 130     Hypertension, benign essential, goal below 140/90 9/16/2016    Strain of lumbar region 6/22/2020    Unspecified essential hypertension           Past Surgical History:     Past Surgical History:   Procedure Laterality Date    CLOSED RX METATARSAL FX  1978    HC TOOTH EXTRACTION W/FORCEP      INJECT JOINT SACROILIAC Bilateral 7/30/2021    Procedure: INJECTION, SACROILIAC JOINT;  Surgeon: Irvin Kelly MD;  Location: [facility] OR          Medications     Current Outpatient Medications   Medication    atorvastatin (LIPITOR) 40 MG tablet    hydrochlorothiazide (HYDRODIURIL) 25 MG tablet    syringe 22G X  "1-1/2\" 3 ML MISC    Alcohol Swabs PADS    cyanocobalamin (CYANOCOBALAMIN) 1000 MCG/ML injection     Current Facility-Administered Medications   Medication    cyanocobalamin injection 1,000 mcg          Family History:     Family History   Problem Relation Age of Onset    Hypertension Mother     Coronary Artery Disease Father     Cancer Brother         MENs disease    Hypertension Brother     Cancer Sister         MENs disease     No known family history of  malignancy.         Social History:     Social History     Socioeconomic History    Marital status:      Spouse name: Not on file    Number of children: Not on file    Years of education: Not on file    Highest education level: Not on file   Occupational History    Not on file   Tobacco Use    Smoking status: Never    Smokeless tobacco: Never   Vaping Use    Vaping Use: Never used   Substance and Sexual Activity    Alcohol use: Yes     Comment: occ    Drug use: No    Sexual activity: Yes     Partners: Female   Other Topics Concern    Parent/sibling w/ CABG, MI or angioplasty before 65F 55M? No   Social History Narrative    Not on file     Social Determinants of Health     Financial Resource Strain: Not on file   Food Insecurity: Not on file   Transportation Needs: Not on file   Physical Activity: Not on file   Stress: Not on file   Social Connections: Not on file   Intimate Partner Violence: Not on file   Housing Stability: Not on file          Allergies:   No known drug allergy         Review of Systems:  From intake questionnaire     Skin: negative  Eyes: negative  Ears/Nose/Throat: negative  Respiratory: No shortness of breath, dyspnea on exertion, cough, or hemoptysis  Cardiovascular: No chest pain or palpitations  Gastrointestinal: negative; no nausea/vomiting, constipation or diarrhea  Genitourinary: as per HPI  Musculoskeletal: negative  Neurologic: negative  Psychiatric: negative  Hematologic/Lymphatic/Immunologic: negative  Endocrine: " "negative         Physical Exam:     Patient is a 58 year old  male   Vitals: Blood pressure 127/82, pulse 79, height 1.778 m (5' 10\"), weight 133.8 kg (295 lb), SpO2 94 %.  Constitutional: Body mass index is 42.33 kg/m .  Alert, no acute distress, oriented, conversant  Eyes: no scleral icterus; extraocular muscles intact, moist conjunctivae  Respiratory: no respiratory distress, or pursed lip breathing  Cardiovascular: pulses strong and intact; no obvious jugular venous distension present  Gastrointestinal: soft, nontender, no organomegaly or masses,   Musculoskeletal: extremities normal, no peripheral edema  Skin: no suspicious lesions or rashes  Neuro: Alert, oriented, speech and mentation normal  Psych: affect and mood normal, alert and oriented to person, place and time      Labs and Pathology:    I reviewed all applicable laboratory and pathology data and went over findings with patient  Significant for   Lab Results   Component Value Date    CR 1.09 07/10/2023    CR 1.40 06/06/2023    CR 2.07 06/02/2023    CR 1.37 04/26/2023    CR 1.74 04/12/2023    CR 1.21 01/13/2022    CR 1.11 03/25/2021    CR 0.87 03/11/2020    CR 0.88 07/10/2019    CR 0.97 10/15/2018    CR 0.90 07/10/2017    CR 0.83 07/03/2017    CR 0.86 04/18/2016    CR 0.98 11/17/2015    CR 0.88 12/13/2013     Prostate Specific Antigen Screen   Date Value Ref Range Status   04/12/2023 0.34 0.00 - 3.50 ng/mL Final   01/13/2022 0.38 0.00 - 4.00 ug/L Final       Imaging:    The following imaging exams were independently viewed and interpreted by me and discussed with patient:    Renal ultrasound images viewed directly by me and reviewed with patient during visit today. 2 cm area of note in left kidney.      Outside and Past Medical records:    Review of the result(s) of each unique test - Renal ultrasound, creat, PSA, UA         Assessment and Plan:     Assessment: 58 year old male with left renal lesion and microscopic hematuria. Regarding the renal " lesion, this is indeterminate and rather small on ultrasound. We discussed that this is an undiagnosed new problem of uncertain prognosis, and further workup is indicated. We reviewed the role for cross-sectional imaging and will plan CT to further evaluate this.    Regarding microscopic hematuria, we reviewed possible causes. Will plan CT urogram, as above, to assess for this and follow-up with office cystoscopy.     Plan:  CT urogram  Schedule office cystoscopy    Orders  Orders Placed This Encounter   Procedures    CT Urogram wo & w Contrast     Alexandru Samaniego MD  Urology  [facility]

## 2023-08-03 NOTE — NURSING NOTE
Chief Complaint   Patient presents with    new kidney mass     Ultrasound done on 06-    Results from the ULTRASOUND.  EVERYTHING IS GOING WELL .  Faith Gonzalez, clinic assistant

## 2023-08-03 NOTE — LETTER
8/3/2023       RE: Aubrey Zavala  [address]  [facility] [address]     Dear Colleague,    Thank you for referring your patient, Aubrey Zavala, to the [facility] at [facility]. Please see a copy of my visit note below.          Chief Complaint:   Left Renal Mass  Microscopic hematuria         Consult or Referral:     Aubrey Zavala is a 58 year old male seen at the request of Dr. Acosta.         History of Present Illness:    Aubrey Zavala is a very pleasant 58 year old male who presents with a history of left renal mass. History of hypertension and had ultrasound done to assess for renal artery stenosis. Also had some transient KAZ, but now improved with change in blood pressure meds. On ultrasound, was noted to have renal lesion in the left mid pole.     He also had microscopic hematuria on most recent UA. No history of gross hematuria. Asymptomatic at that time. No history of smoking. Family does have history of MEN syndrome, but appears Aubrey does not have it.     PSA 0.34    Renal ultrasound 6/8/2023  IMPRESSION:  1.  No evidence of a hemodynamically significant renal artery  stenosis.  2.  There is a rounded hypoechoic structure in the left mid pole renal  cortex measuring 2.1 x 1.7 x 1.8 cm. Finding may represent a dromedary  hump or proteinaceous cystic structure however cannot rule out a solid  mass. Recommend CT renal mass protocol for further evaluation.  3.  Incidentally noted hepatic steatosis.         Past Medical History:     Past Medical History:   Diagnosis Date    CARDIOVASCULAR SCREENING; LDL GOAL LESS THAN 130     Hypertension, benign essential, goal below 140/90 9/16/2016    Strain of lumbar region 6/22/2020    Unspecified essential hypertension           Past Surgical History:     Past Surgical History:   Procedure Laterality Date    CLOSED RX METATARSAL FX  1978    HC TOOTH EXTRACTION W/FORCEP      INJECT  "JOINT SACROILIAC Bilateral 7/30/2021    Procedure: INJECTION, SACROILIAC JOINT;  Surgeon: Irvin Kelly MD;  Location: [facility] OR          Medications     Current Outpatient Medications   Medication    atorvastatin (LIPITOR) 40 MG tablet    hydrochlorothiazide (HYDRODIURIL) 25 MG tablet    syringe 22G X 1-1/2\" 3 ML MISC    Alcohol Swabs PADS    cyanocobalamin (CYANOCOBALAMIN) 1000 MCG/ML injection     Current Facility-Administered Medications   Medication    cyanocobalamin injection 1,000 mcg          Family History:     Family History   Problem Relation Age of Onset    Hypertension Mother     Coronary Artery Disease Father     Cancer Brother         MENs disease    Hypertension Brother     Cancer Sister         MENs disease     No known family history of  malignancy.         Social History:     Social History     Socioeconomic History    Marital status:      Spouse name: Not on file    Number of children: Not on file    Years of education: Not on file    Highest education level: Not on file   Occupational History    Not on file   Tobacco Use    Smoking status: Never    Smokeless tobacco: Never   Vaping Use    Vaping Use: Never used   Substance and Sexual Activity    Alcohol use: Yes     Comment: occ    Drug use: No    Sexual activity: Yes     Partners: Female   Other Topics Concern    Parent/sibling w/ CABG, MI or angioplasty before 65F 55M? No   Social History Narrative    Not on file     Social Determinants of Health     Financial Resource Strain: Not on file   Food Insecurity: Not on file   Transportation Needs: Not on file   Physical Activity: Not on file   Stress: Not on file   Social Connections: Not on file   Intimate Partner Violence: Not on file   Housing Stability: Not on file          Allergies:   No known drug allergy         Review of Systems:  From intake questionnaire     Skin: negative  Eyes: negative  Ears/Nose/Throat: negative  Respiratory: No shortness of breath, dyspnea on exertion, " "cough, or hemoptysis  Cardiovascular: No chest pain or palpitations  Gastrointestinal: negative; no nausea/vomiting, constipation or diarrhea  Genitourinary: as per HPI  Musculoskeletal: negative  Neurologic: negative  Psychiatric: negative  Hematologic/Lymphatic/Immunologic: negative  Endocrine: negative         Physical Exam:     Patient is a 58 year old  male   Vitals: Blood pressure 127/82, pulse 79, height 1.778 m (5' 10\"), weight 133.8 kg (295 lb), SpO2 94 %.  Constitutional: Body mass index is 42.33 kg/m .  Alert, no acute distress, oriented, conversant  Eyes: no scleral icterus; extraocular muscles intact, moist conjunctivae  Respiratory: no respiratory distress, or pursed lip breathing  Cardiovascular: pulses strong and intact; no obvious jugular venous distension present  Gastrointestinal: soft, nontender, no organomegaly or masses,   Musculoskeletal: extremities normal, no peripheral edema  Skin: no suspicious lesions or rashes  Neuro: Alert, oriented, speech and mentation normal  Psych: affect and mood normal, alert and oriented to person, place and time      Labs and Pathology:    I reviewed all applicable laboratory and pathology data and went over findings with patient  Significant for   Lab Results   Component Value Date    CR 1.09 07/10/2023    CR 1.40 06/06/2023    CR 2.07 06/02/2023    CR 1.37 04/26/2023    CR 1.74 04/12/2023    CR 1.21 01/13/2022    CR 1.11 03/25/2021    CR 0.87 03/11/2020    CR 0.88 07/10/2019    CR 0.97 10/15/2018    CR 0.90 07/10/2017    CR 0.83 07/03/2017    CR 0.86 04/18/2016    CR 0.98 11/17/2015    CR 0.88 12/13/2013     Prostate Specific Antigen Screen   Date Value Ref Range Status   04/12/2023 0.34 0.00 - 3.50 ng/mL Final   01/13/2022 0.38 0.00 - 4.00 ug/L Final       Imaging:    The following imaging exams were independently viewed and interpreted by me and discussed with patient:    Renal ultrasound images viewed directly by me and reviewed with patient during visit " today. 2 cm area of note in left kidney.      Outside and Past Medical records:    Review of the result(s) of each unique test - Renal ultrasound, creat, PSA, UA         Assessment and Plan:     Assessment: 58 year old male with left renal lesion and microscopic hematuria. Regarding the renal lesion, this is indeterminate and rather small on ultrasound. We discussed that this is an undiagnosed new problem of uncertain prognosis, and further workup is indicated. We reviewed the role for cross-sectional imaging and will plan CT to further evaluate this.    Regarding microscopic hematuria, we reviewed possible causes. Will plan CT urogram, as above, to assess for this and follow-up with office cystoscopy.     Plan:  CT urogram  Schedule office cystoscopy    Orders  Orders Placed This Encounter   Procedures    CT Urogram wo & w Contrast     Alexandru Samaniego MD  Urology  [facility]

## 2023-08-15 ENCOUNTER — ANCILLARY PROCEDURE (OUTPATIENT)
Dept: CT IMAGING | Facility: CLINIC | Age: 58
End: 2023-08-15
Attending: UROLOGY
Payer: COMMERCIAL

## 2023-08-15 DIAGNOSIS — R31.29 MICROSCOPIC HEMATURIA: ICD-10-CM

## 2023-08-15 DIAGNOSIS — N28.89 LEFT RENAL MASS: ICD-10-CM

## 2023-08-15 PROCEDURE — 74178 CT ABD&PLV WO CNTR FLWD CNTR: CPT

## 2023-08-15 PROCEDURE — 250N000011 HC RX IP 250 OP 636: Performed by: UROLOGY

## 2023-08-15 PROCEDURE — 250N000009 HC RX 250: Performed by: UROLOGY

## 2023-08-15 RX ORDER — IOPAMIDOL 755 MG/ML
100 INJECTION, SOLUTION INTRAVASCULAR ONCE
Status: COMPLETED | OUTPATIENT
Start: 2023-08-15 | End: 2023-08-15

## 2023-08-15 RX ADMIN — IOPAMIDOL 100 ML: 755 INJECTION, SOLUTION INTRAVENOUS at 15:07

## 2023-08-15 RX ADMIN — SODIUM CHLORIDE 125 ML: 9 INJECTION, SOLUTION INTRAVENOUS at 15:08

## 2023-08-17 ENCOUNTER — OFFICE VISIT (OUTPATIENT)
Dept: UROLOGY | Facility: CLINIC | Age: 58
End: 2023-08-17
Payer: COMMERCIAL

## 2023-08-17 VITALS
SYSTOLIC BLOOD PRESSURE: 130 MMHG | HEART RATE: 90 BPM | WEIGHT: 290 LBS | DIASTOLIC BLOOD PRESSURE: 78 MMHG | OXYGEN SATURATION: 94 % | HEIGHT: 70 IN | BODY MASS INDEX: 41.52 KG/M2

## 2023-08-17 DIAGNOSIS — R31.29 MICROSCOPIC HEMATURIA: Primary | ICD-10-CM

## 2023-08-17 LAB
ALBUMIN UR-MCNC: NEGATIVE MG/DL
APPEARANCE UR: CLEAR
BILIRUB UR QL STRIP: NEGATIVE
COLOR UR AUTO: YELLOW
GLUCOSE UR STRIP-MCNC: NEGATIVE MG/DL
HGB UR QL STRIP: NEGATIVE
KETONES UR STRIP-MCNC: NEGATIVE MG/DL
LEUKOCYTE ESTERASE UR QL STRIP: NEGATIVE
NITRATE UR QL: NEGATIVE
PH UR STRIP: 6 [PH] (ref 5–7)
SP GR UR STRIP: 1.02 (ref 1–1.03)
UROBILINOGEN UR STRIP-ACNC: 0.2 E.U./DL

## 2023-08-17 PROCEDURE — 81003 URINALYSIS AUTO W/O SCOPE: CPT | Mod: QW | Performed by: UROLOGY

## 2023-08-17 PROCEDURE — 52000 CYSTOURETHROSCOPY: CPT | Performed by: UROLOGY

## 2023-08-17 RX ORDER — LIDOCAINE HYDROCHLORIDE 20 MG/ML
JELLY TOPICAL ONCE
Status: COMPLETED | OUTPATIENT
Start: 2023-08-17 | End: 2023-08-17

## 2023-08-17 RX ADMIN — LIDOCAINE HYDROCHLORIDE 5 ML: 20 JELLY TOPICAL at 14:56

## 2023-08-17 NOTE — NURSING NOTE
Chief Complaint   Patient presents with    Microscopic hematuria       Patient here today for Cystoscopy in office with Dr Samaniego          Patient state doing great.  Patient aware this is part of his work up with ct      UA RESULTS:  Recent Labs   Lab Test 08/17/23  1433 07/10/23  1621   COLOR Yellow Yellow   APPEARANCE Clear Clear   URINEGLC Negative Negative   URINEBILI Negative Negative   URINEKETONE Negative Negative   SG 1.025 >=1.030   UBLD Negative Negative   URINEPH 6.0 5.5   PROTEIN Negative Negative   UROBILINOGEN 0.2 1.0   NITRITE Negative Negative   LEUKEST Negative Negative   RBCU  --  2-5*   WBCU  --  0-5       Prior to the start of the procedure and with procedural staff participation, I verbally confirmed the patient s identity using two indicators, relevant allergies, that the procedure was appropriate and matched the consent or emergent situation, and that the correct equipment/implants were available. Immediately prior to starting the procedure I conducted the Time Out with the procedural staff and re-confirmed the patient s name, procedure, and site/side. I have wiped the patient off with the povidone-Iodine solution, draped them,  used Lidocaine hydrochloride jelly, and instilled sterile water into the bladder. (The Joint Commission universal protocol was followed.)  Yes    Sedation (Moderate or Deep): None    5mL 2% lidocaine hydrochloride Urojet instilled into urethra.    NDC# [address]-2  Lot #:[id number]  Expiration Date:  11/24    Nancy Limon TALHA

## 2023-08-17 NOTE — PROGRESS NOTES
"  CHIEF COMPLAINT   It was my pleasure to see Aubrey Zavala who is a 58 year old male for follow-up of microscopic hematuria.      HPI  Aubrey Zavala is a very pleasant 58 year old male who presents with a history of suspected renal lesion and microscopic hematuria. History of hypertension and had ultrasound done to assess for renal artery stenosis. Also had some transient KAZ, but now improved with change in blood pressure meds. On ultrasound, was noted to have renal lesion in the left mid pole. Follow-up on CT demonstrates no suspicious lesions.     He also had microscopic hematuria on most recent UA. No history of gross hematuria. Asymptomatic at that time. No history of smoking. Family does have history of MEN syndrome, but appears Aubrey does not have it.     CT Urogram 8/17/2023  IMPRESSION:  1.  No evidence of a renal, ureteral or bladder calculus. No upper urothelial tract lesion.  2.  Sequelae of chronic/prior inflammation involving the ascending colon, terminal ileum and proximal duodenum. Apparent mild active inflammation of the proximal jejunum. Findings are nonspecific, although could relate to NSAID induced enteropathy,   however, other inflammatory disease etiologies are on the differential. Clinical correlation is recommended.  3.  Mild hepatic steatosis.     PSA 0.34     Renal ultrasound 6/8/2023  IMPRESSION:  1.  No evidence of a hemodynamically significant renal artery  stenosis.  2.  There is a rounded hypoechoic structure in the left mid pole renal  cortex measuring 2.1 x 1.7 x 1.8 cm. Finding may represent a dromedary  hump or proteinaceous cystic structure however cannot rule out a solid  mass. Recommend CT renal mass protocol for further evaluation.  3.  Incidentally noted hepatic steatosis.    PHYSICAL EXAM  Patient is a 58 year old  male   Vitals: Blood pressure 130/78, pulse 90, height 1.778 m (5' 10\"), weight 131.5 kg (290 lb), SpO2 94 %.  General Appearance Adult: Body mass index is " 41.61 kg/m .  Alert, no acute distress, oriented  Lungs: no respiratory distress, or pursed lip breathing  Abdomen: soft, nontender, no organomegaly or masses  Back: no CVAT  Neuro: Alert, oriented, speech and mentation normal  Psych: affect and mood normal  : penis, scrotum, testes normal    OFFICE CYSTOSCOPY 8/17/2023    Pre-procedure diagnosis:  Microscopic Hematuria  Post-procedure diagnosis: Negative Cystoscopy  Procedure performed:  Cystourethroscopy  Surgeon:    Alexandru Samaniego MD  Anesthesia:    Local    Description of procedure:   After fully informed, voluntary consent was obtained, the patient was brought into the procedure room, identified and placed in a supine position on the cystoscopy table.  The groin/scrotum were prepped with betadine and draped in a sterile fashion. Urojet lidocaine gel was introduced.  A 15F flexible cystoscope was inserted into the urethra, and the bladder and urethra were examined in a systematic manner.  The patient tolerated the procedure well and there were no complications.      Cystoscopic findings:  The urethra was normal without strictures.  The prostate was 3cm long and demonstrated mild bilobar hypertrophy.  There was no median lobe.  The external sphincter coapted well and the bladder neck was open. The bladder was completely surveyed.  There was mild trabeculation.  There were no neoplasms, stones, or diverticula identifed.  The ureteric orifices were normal in position and number and effluxing clear urine.    ASSESSMENT and PLAN  58 year old male with history of microscopic hematuria and previously suspected renal lesion. CT demonstrates no suspicious findings in the kidney; suspect area on ultrasound was simply artifact. No further workup indicated.     The hematuria work up revealed no source blood.   Upper tract Imaging is normal  Cystoscopy is normal  Therefore according to [facility] Best Practice Policy Recommendations, we would recommend the patient follow up with  his primary care physician for annual UAs.  If these demonstrate no hematuria over the next two years, there is no need for further follow up.  If the hematuria is persistent only on a microscopic level, he should return for urologic evaluation in 3-5 years per AUA recommendations.  If gross hematuria is experienced, he should return for evaluation sooner. If there is hypertension, or proteinuria, casts etc in addition to the hematuria, recommend a nephrology evaluation. If gross hematuria returns in the absence of primary renal disease, would repeat the hematuria workup in 24 months.    Follow-up with urology as needed.    Alexandru Samaniego MD  Urology  [facility]

## 2023-08-17 NOTE — PATIENT INSTRUCTIONS
"AFTER YOUR CYSTOSCOPY  ?  ?  You have just completed a cystoscopy, or \"cysto\", which allowed your physician to learn more about your bladder (or to remove a stent placed after surgery). We suggest that you continue to avoid caffeine, fruit juice, and alcohol for the next 24 hours, however, you are encouraged to return to your normal activities.  ?  ?  A few things that are considered normal after your cystoscopy:  ?  * small amount of bleeding (or spotting) that clears within the next 24 hours  ?  * slight burning sensation with urination  ?  * sensation of needing to void (urinate) more frequently  ?  * the feeling of \"air\" in your urine  ?  * mild discomfort that is relieved with Tylenol    * bladder spasms  ?  ?  ?  Please contact our office promptly if you:  ?  * develop a fever above 101 degrees  ?  * are unable to urinate  ?  * develop bright red blood that does not stop  ?  * experience severe pain or swelling  ?  ?  ?  And of course, please contact our office with any concerns or questions [phone].  ?    AFTER YOUR CYSTOSCOPY        You have just completed a cystoscopy, or \"cysto\", which allowed your physician to learn more about your bladder (or to remove a stent placed after surgery). We suggest that you continue to avoid caffeine, fruit juice, and alcohol for the next 24 hours, however, you are encouraged to return to your normal activities.         A few things that are considered normal after your cystoscopy:     * Small amount of bleeding (or spotting) that clears within the next 24 hours     * Slight burning sensation with urination     * Sensation to of needing to avoid more frequently     * The feeling of \"air\" in your urine     * Mild discomfort that is relieved with Tylenol        Please contact our office promptly if you:     * Develop a fever above 101 degrees     * Are unable to urinate     * Develop bright red blood that does not stop     * Severe pain or swelling         Please contact " our office with any concerns or questions @[facility].

## 2023-08-17 NOTE — LETTER
8/17/2023       RE: Aubrey Zavala  [address]  [facility] [address]     Dear Colleague,    Thank you for referring your patient, Aubrey Zavala, to the [facility] at [facility]. Please see a copy of my visit note below.      CHIEF COMPLAINT   It was my pleasure to see Aubrey Zavala who is a 58 year old male for follow-up of microscopic hematuria.      HPI  Aubrey Zavala is a very pleasant 58 year old male who presents with a history of suspected renal lesion and microscopic hematuria. History of hypertension and had ultrasound done to assess for renal artery stenosis. Also had some transient KAZ, but now improved with change in blood pressure meds. On ultrasound, was noted to have renal lesion in the left mid pole. Follow-up on CT demonstrates no suspicious lesions.     He also had microscopic hematuria on most recent UA. No history of gross hematuria. Asymptomatic at that time. No history of smoking. Family does have history of MEN syndrome, but appears Aubrye does not have it.     CT Urogram 8/17/2023  IMPRESSION:  1.  No evidence of a renal, ureteral or bladder calculus. No upper urothelial tract lesion.  2.  Sequelae of chronic/prior inflammation involving the ascending colon, terminal ileum and proximal duodenum. Apparent mild active inflammation of the proximal jejunum. Findings are nonspecific, although could relate to NSAID induced enteropathy,   however, other inflammatory disease etiologies are on the differential. Clinical correlation is recommended.  3.  Mild hepatic steatosis.     PSA 0.34     Renal ultrasound 6/8/2023  IMPRESSION:  1.  No evidence of a hemodynamically significant renal artery  stenosis.  2.  There is a rounded hypoechoic structure in the left mid pole renal  cortex measuring 2.1 x 1.7 x 1.8 cm. Finding may represent a dromedary  hump or proteinaceous cystic structure however cannot rule out a  "solid  mass. Recommend CT renal mass protocol for further evaluation.  3.  Incidentally noted hepatic steatosis.    PHYSICAL EXAM  Patient is a 58 year old  male   Vitals: Blood pressure 130/78, pulse 90, height 1.778 m (5' 10\"), weight 131.5 kg (290 lb), SpO2 94 %.  General Appearance Adult: Body mass index is 41.61 kg/m .  Alert, no acute distress, oriented  Lungs: no respiratory distress, or pursed lip breathing  Abdomen: soft, nontender, no organomegaly or masses  Back: no CVAT  Neuro: Alert, oriented, speech and mentation normal  Psych: affect and mood normal  : penis, scrotum, testes normal    OFFICE CYSTOSCOPY 8/17/2023    Pre-procedure diagnosis:  Microscopic Hematuria  Post-procedure diagnosis: Negative Cystoscopy  Procedure performed:  Cystourethroscopy  Surgeon:    Alexandru Samaniego MD  Anesthesia:    Local    Description of procedure:   After fully informed, voluntary consent was obtained, the patient was brought into the procedure room, identified and placed in a supine position on the cystoscopy table.  The groin/scrotum were prepped with betadine and draped in a sterile fashion. Urojet lidocaine gel was introduced.  A 15F flexible cystoscope was inserted into the urethra, and the bladder and urethra were examined in a systematic manner.  The patient tolerated the procedure well and there were no complications.      Cystoscopic findings:  The urethra was normal without strictures.  The prostate was 3cm long and demonstrated mild bilobar hypertrophy.  There was no median lobe.  The external sphincter coapted well and the bladder neck was open. The bladder was completely surveyed.  There was mild trabeculation.  There were no neoplasms, stones, or diverticula identifed.  The ureteric orifices were normal in position and number and effluxing clear urine.    ASSESSMENT and PLAN  58 year old male with history of microscopic hematuria and previously suspected renal lesion. CT demonstrates no suspicious " findings in the kidney; suspect area on ultrasound was simply artifact. No further workup indicated.     The hematuria work up revealed no source blood.   Upper tract Imaging is normal  Cystoscopy is normal  Therefore according to [facility] Best Practice Policy Recommendations, we would recommend the patient follow up with his primary care physician for annual UAs.  If these demonstrate no hematuria over the next two years, there is no need for further follow up.  If the hematuria is persistent only on a microscopic level, he should return for urologic evaluation in 3-5 years per [facility] recommendations.  If gross hematuria is experienced, he should return for evaluation sooner. If there is hypertension, or proteinuria, casts etc in addition to the hematuria, recommend a nephrology evaluation. If gross hematuria returns in the absence of primary renal disease, would repeat the hematuria workup in 24 months.    Follow-up with urology as needed.    Alexandru Samaniego MD  Urology  [facility]

## 2023-08-24 ENCOUNTER — ANCILLARY PROCEDURE (OUTPATIENT)
Dept: GENERAL RADIOLOGY | Facility: CLINIC | Age: 58
End: 2023-08-24
Attending: PHYSICIAN ASSISTANT
Payer: COMMERCIAL

## 2023-08-24 ENCOUNTER — OFFICE VISIT (OUTPATIENT)
Dept: URGENT CARE | Facility: URGENT CARE | Age: 58
End: 2023-08-24
Payer: COMMERCIAL

## 2023-08-24 VITALS
WEIGHT: 290 LBS | DIASTOLIC BLOOD PRESSURE: 87 MMHG | BODY MASS INDEX: 41.61 KG/M2 | SYSTOLIC BLOOD PRESSURE: 138 MMHG | OXYGEN SATURATION: 98 % | HEART RATE: 90 BPM | TEMPERATURE: 98.1 F

## 2023-08-24 DIAGNOSIS — M25.562 ACUTE PAIN OF LEFT KNEE: Primary | ICD-10-CM

## 2023-08-24 DIAGNOSIS — M25.562 ACUTE PAIN OF LEFT KNEE: ICD-10-CM

## 2023-08-24 PROCEDURE — 73562 X-RAY EXAM OF KNEE 3: CPT | Mod: TC | Performed by: RADIOLOGY

## 2023-08-24 PROCEDURE — 99214 OFFICE O/P EST MOD 30 MIN: CPT | Performed by: PHYSICIAN ASSISTANT

## 2023-08-24 ASSESSMENT — ENCOUNTER SYMPTOMS
FEVER: 0
COLOR CHANGE: 0
WOUND: 0

## 2023-08-24 NOTE — PROGRESS NOTES
Assessment & Plan:        ICD-10-CM    1. Acute pain of left knee  M25.562 XR Knee Left 3 Views     Orthopedic  Referral     Physical Therapy Referral            Plan/Clinical Decision Making:    Patient presents with left knee pain for 1 week.  No history of injury or trauma.  No fever, redness of joint, or wounds over joint.  On exam has good stability, no laxity pain with palpation of anterior knee, and at the end range of motion.  Patient has a difficult time walking and walks with a limp due to pain.  Has no skin rashes, joint swelling, or redness.  Afebrile.     Discussed rest, ice, Tylenol for pain  I independently visualized the xray:  has some signs of degenerative changes, no acute fracture.     I referred patient to Ortho and PT.     Return if symptoms worsen or fail to improve, for in 5-7 days.     At the end of the encounter, I discussed results, diagnosis, medications. Discussed red flags for immediate return to clinic/ER, as well as indications for follow up if no improvement. Patient understood and agreed to plan. Patient was stable for discharge.        Mili Arndt PA-C on 8/24/2023 at 5:52 PM    32 minutes spent on the date of the encounter doing chart review, history and exam, documentation and further activities per the note        Subjective:     HPI:    Aubrey is a 58 year old male who presents to clinic today for the following health issues:  Chief Complaint   Patient presents with    Urgent Care    Knee Pain     C/O knee pain for 1 week  and ankle pain for 2 days , no injury     HPI    Patient complains of left knee pain. Having pain with moving and bearing weight. Last week ankles have been painful. No injury or trauma. No increase walking or activity. No redness of joints, no rashes.     Patient has KAZ, unable to take NSAIDs.     History obtained from the patient.    Review of Systems   Constitutional:  Negative for fever.   Skin:  Negative for color change, rash and wound.          Patient Active Problem List   Diagnosis    Hypertension, benign essential, goal below 140/90    BMI >35 with HTN (H)    Sacroiliac joint pain    Prediabetes    Vitamin B12 deficiency (non anemic)    Elevated serum creatinine    Hyperlipidemia LDL goal <100        Past Medical History:   Diagnosis Date    CARDIOVASCULAR SCREENING; LDL GOAL LESS THAN 130     Hypertension, benign essential, goal below 140/90 9/16/2016    Strain of lumbar region 6/22/2020    Unspecified essential hypertension        Social History     Tobacco Use    Smoking status: Never     Passive exposure: Never    Smokeless tobacco: Never   Substance Use Topics    Alcohol use: Yes     Comment: occ             Objective:     Vitals:    08/24/23 1726   BP: 138/87   Pulse: 90   Temp: 98.1  F (36.7  C)   TempSrc: Tympanic   SpO2: 98%   Weight: 131.5 kg (290 lb)         Physical Exam   EXAM:   Pleasant, alert, appropriate appearance. NAD.  Ext/musculoskeletal: Patient has mildly limited range of motion of left knee, pain at the end of range of motion.  Negative Lachman's, negative Aurora's, pain on palpation of anterior knee, no swelling or deformity.  Walking with a bit of a limp.  Ankle exam; has tenderness bilaterally of Achilles tendons  Skin: no rash or lesion.  No joint erythema.      Results:  No results found for any visits on 08/24/23.

## 2023-09-12 ENCOUNTER — THERAPY VISIT (OUTPATIENT)
Dept: PHYSICAL THERAPY | Facility: CLINIC | Age: 58
End: 2023-09-12
Attending: PHYSICIAN ASSISTANT
Payer: COMMERCIAL

## 2023-09-12 DIAGNOSIS — M25.562 ACUTE PAIN OF LEFT KNEE: ICD-10-CM

## 2023-09-12 PROCEDURE — 97110 THERAPEUTIC EXERCISES: CPT | Mod: GP | Performed by: PHYSICAL THERAPIST

## 2023-09-12 PROCEDURE — 97161 PT EVAL LOW COMPLEX 20 MIN: CPT | Mod: GP | Performed by: PHYSICAL THERAPIST

## 2023-09-12 ASSESSMENT — ACTIVITIES OF DAILY LIVING (ADL)
LIMPING: THE SYMPTOM PREVENTS ME FROM ALL DAILY ACTIVITIES
GO DOWN STAIRS: ACTIVITY IS VERY DIFFICULT
WEAKNESS: THE SYMPTOM PREVENTS ME FROM ALL DAILY ACTIVITIES
SIT WITH YOUR KNEE BENT: ACTIVITY IS SOMEWHAT DIFFICULT
KNEE_ACTIVITY_OF_DAILY_LIVING_SCORE: 24.29
PAIN: THE SYMPTOM AFFECTS MY ACTIVITY SEVERELY
GIVING WAY, BUCKLING OR SHIFTING OF KNEE: THE SYMPTOM AFFECTS MY ACTIVITY SLIGHTLY
KNEE_ACTIVITY_OF_DAILY_LIVING_SUM: 17
HOW_WOULD_YOU_RATE_THE_OVERALL_FUNCTION_OF_YOUR_KNEE_DURING_YOUR_USUAL_DAILY_ACTIVITIES?: SEVERELY ABNORMAL
SQUAT: I AM UNABLE TO DO THE ACTIVITY
RISE FROM A CHAIR: ACTIVITY IS SOMEWHAT DIFFICULT
WALK: ACTIVITY IS VERY DIFFICULT
HOW_WOULD_YOU_RATE_THE_CURRENT_FUNCTION_OF_YOUR_KNEE_DURING_YOUR_USUAL_DAILY_ACTIVITIES_ON_A_SCALE_FROM_0_TO_100_WITH_100_BEING_YOUR_LEVEL_OF_KNEE_FUNCTION_PRIOR_TO_YOUR_INJURY_AND_0_BEING_THE_INABILITY_TO_PERFORM_ANY_OF_YOUR_USUAL_DAILY_ACTIVITIES?: 10
RAW_SCORE: 17
KNEEL ON THE FRONT OF YOUR KNEE: I AM UNABLE TO DO THE ACTIVITY
STIFFNESS: THE SYMPTOM AFFECTS MY ACTIVITY SEVERELY
GO UP STAIRS: ACTIVITY IS VERY DIFFICULT
STAND: ACTIVITY IS VERY DIFFICULT
AS_A_RESULT_OF_YOUR_KNEE_INJURY,_HOW_WOULD_YOU_RATE_YOUR_CURRENT_LEVEL_OF_DAILY_ACTIVITY?: ABNORMAL
SWELLING: THE SYMPTOM AFFECTS MY ACTIVITY MODERATELY

## 2023-09-12 NOTE — PROGRESS NOTES
PHYSICAL THERAPY EVALUATION  Type of Visit: Evaluation    See electronic medical record for Abuse and Falls Screening details.      Patient states 3 weeks ago, knee began to be painful on medial side and posterior side. Got to the point where he visited . They did an x-ray that showed degenerative changes at joint and superior pole of patella. Cannot actively bend knee without pain, and feels like when it is bent - it feels like it locks into place and becomes very painful. Tends to keep his knee straighter. When sitting not in pain if still, but when moving feels the pain medially and posterior to knee. Taking stairs one step at time. Feels like his knee might buckle. Has had a history of casting on L knee due to increased swelling in L knee. Has bilateral ankle swelling that has been going on for years, but feels it has gotten worse in last few months. PT suggested to mention to primary care or kidney doctor.       Subjective       Presenting condition or subjective complaint: Unable to walk without a limp, if sitting for a long time it sometimes is painful.  Date of onset: 08/24/23    Relevant medical history: High blood pressure; Overweight   Dates & types of surgery:      Prior diagnostic imaging/testing results: X-ray     Prior therapy history for the same diagnosis, illness or injury: No      Prior Level of Function  Transfers: Independent  Ambulation: Independent  ADL: Independent  IADL: Housekeeping, Laundry, Meal preparation, Work    Living Environment  Social support: With a significant other or spouse   Type of home: House; Multi-level; Basement   Stairs to enter the home: Yes 2 Is there a railing: No   Ramp: No   Stairs inside the home: Yes 12 Is there a railing: Yes   Help at home: None  Equipment owned:       Employment: Yes   Hobbies/Interests:      Patient goals for therapy: Walk without limping. I was asked if i wanted a driveable cart when going shopping.    Pain assessment:  Location: medial joint line and posterior knee, some discomfort superior patella/Ratin/10 best, 4/10 average, 9/10 at worst     Objective   KNEE EVALUATION  PAIN: Pain is Exacerbated By: walking, squatting, stairs  Pain is Relieved By: cold  INTEGUMENTARY (edema, incisions):  circumfrential measurement: L = 48 cm, R = 45 cm     GAIT:  Weightbearing Status: WBAT  Assistive Device(s): None  Gait Deviations: Knee extension decreased L, decreased step length L, increased stance time R       NON-WEIGHTBEARING ALIGNMENT: Patellar lateral tilt L  ROM:   (Degrees) Left AROM Left PROM  Right AROM Right PROM   Knee extension 0 3 into hyperextension, pain with overpressure  0 7 into hyperextension   Knee flexion 100 with pain 120, pain with overpressure 130 135   Pain:   End feel:     STRENGTH:   Pain: - none + mild ++ moderate +++ severe  Strength Scale: 0-5/5 Left Right   Knee Flexion 4-, + (mild) 5   Knee Extension 3+, ++ (mod) 5   Quad Set 4+, delayed activation 5     FLEXIBILITY:  bilatearl hamstring tightness  SPECIAL TESTS:    Left Right   Apley's (Meniscus) Positive Negative    Aurora's (Meniscus) Positive Negative    Maricruz's (ITB/TFL) Negative  Negative    Patellar Apprehension Test Negative  Negative    Patella Tracking Lateral displacement, Lateral patellar tilt Lateral displacement   Ligamentous Stability     Anterior Drawer (ACL) Negative Negative   Posterior Drawer (PCL) Negative Negative   Prone Dial Test at 30 Deg and 90 Deg (PCL/PLC) Negative Negative   Valgus Stress Testing at 0 Deg and 30 Deg Negative Negative   Varus Stress Testing at 0 Deg and 30 Deg  Negative Negative     FUNCTIONAL TESTS: Double Leg Squat: Anterior knee translation, Knee valgus, Hip internal rotation, Improper use of glutes/hips, and increased pain with increasing depth   SLS: unable to bear weight on L knee without feeling of buckling   PALPATION:  TTP along Joint line   JOINT MOBILITY:  NT    Assessment & Plan   CLINICAL  IMPRESSIONS  Medical Diagnosis: acute pain left knee    Treatment Diagnosis: L meniscal injury versus OA   Impression/Assessment: Patient is a 58 year old male with left knee complaints.  The following significant findings have been identified: Pain, Decreased ROM/flexibility, Decreased joint mobility, Decreased strength, and Impaired balance. These impairments interfere with their ability to perform self care tasks, work tasks, recreational activities, household chores, and household mobility as compared to previous level of function.     Clinical Decision Making (Complexity):  Clinical Presentation: Stable/Uncomplicated  Clinical Presentation Rationale: based on medical and personal factors listed in PT evaluation  Clinical Decision Making (Complexity): Low complexity    PLAN OF CARE  Treatment Interventions:  Modalities: Cryotherapy, Dry Needling  Interventions: Gait Training, Manual Therapy, Neuromuscular Re-education, Therapeutic Activity, Therapeutic Exercise, Self-Care/Home Management    Long Term Goals     PT Goal 1  Goal Identifier: Ambulation  Goal Description: Patient will ambulate 1 block with normal gait mechanics independently on even grround with 1/10 pain in L knee  Rationale: to maximize safety and independence within the home;to maximize safety and independence within the community  Target Date: 12/05/23      Frequency of Treatment: 1x per week 4 weeks, 2x per month 2 months  Duration of Treatment: 84 days    Recommended Referrals to Other Professionals:  Sports and Orthopedics, referral already in system, Patient deferred at this time  Education Assessment:   Learner/Method: Patient;No Barriers to Learning    Risks and benefits of evaluation/treatment have been explained.   Patient/Family/caregiver agrees with Plan of Care.     Evaluation Time:     PT Eval, Low Complexity Minutes (03045): 25     Signing Clinician: Janiya Gill, PT

## 2023-09-22 DIAGNOSIS — I10 HYPERTENSION, ESSENTIAL: Primary | ICD-10-CM

## 2023-10-03 ENCOUNTER — THERAPY VISIT (OUTPATIENT)
Dept: PHYSICAL THERAPY | Facility: CLINIC | Age: 58
End: 2023-10-03
Attending: PHYSICIAN ASSISTANT
Payer: COMMERCIAL

## 2023-10-03 DIAGNOSIS — M25.562 ACUTE PAIN OF LEFT KNEE: Primary | ICD-10-CM

## 2023-10-03 PROCEDURE — 97110 THERAPEUTIC EXERCISES: CPT | Mod: GP | Performed by: PHYSICAL THERAPIST

## 2023-10-09 ENCOUNTER — LAB (OUTPATIENT)
Dept: LAB | Facility: CLINIC | Age: 58
End: 2023-10-09
Payer: COMMERCIAL

## 2023-10-09 DIAGNOSIS — E53.8 VITAMIN B12 DEFICIENCY (NON ANEMIC): ICD-10-CM

## 2023-10-09 DIAGNOSIS — I10 HYPERTENSION, ESSENTIAL: ICD-10-CM

## 2023-10-09 LAB
ANION GAP SERPL CALCULATED.3IONS-SCNC: 15 MMOL/L (ref 7–15)
BASO+EOS+MONOS # BLD AUTO: NORMAL 10*3/UL
BASO+EOS+MONOS NFR BLD AUTO: NORMAL %
BASOPHILS # BLD AUTO: 0 10E3/UL (ref 0–0.2)
BASOPHILS NFR BLD AUTO: 0 %
BUN SERPL-MCNC: 14.5 MG/DL (ref 6–20)
CALCIUM SERPL-MCNC: 9.2 MG/DL (ref 8.6–10)
CHLORIDE SERPL-SCNC: 98 MMOL/L (ref 98–107)
CREAT SERPL-MCNC: 1.15 MG/DL (ref 0.67–1.17)
DEPRECATED HCO3 PLAS-SCNC: 28 MMOL/L (ref 22–29)
EGFRCR SERPLBLD CKD-EPI 2021: 74 ML/MIN/1.73M2
EOSINOPHIL # BLD AUTO: 0.1 10E3/UL (ref 0–0.7)
EOSINOPHIL NFR BLD AUTO: 1 %
ERYTHROCYTE [DISTWIDTH] IN BLOOD BY AUTOMATED COUNT: 13.1 % (ref 10–15)
GLUCOSE SERPL-MCNC: 128 MG/DL (ref 70–99)
HCT VFR BLD AUTO: 43 % (ref 40–53)
HGB BLD-MCNC: 14.6 G/DL (ref 13.3–17.7)
IMM GRANULOCYTES # BLD: 0 10E3/UL
IMM GRANULOCYTES NFR BLD: 0 %
LYMPHOCYTES # BLD AUTO: 1.7 10E3/UL (ref 0.8–5.3)
LYMPHOCYTES NFR BLD AUTO: 21 %
MCH RBC QN AUTO: 29.9 PG (ref 26.5–33)
MCHC RBC AUTO-ENTMCNC: 34 G/DL (ref 31.5–36.5)
MCV RBC AUTO: 88 FL (ref 78–100)
MONOCYTES # BLD AUTO: 0.9 10E3/UL (ref 0–1.3)
MONOCYTES NFR BLD AUTO: 11 %
NEUTROPHILS # BLD AUTO: 5.4 10E3/UL (ref 1.6–8.3)
NEUTROPHILS NFR BLD AUTO: 66 %
PLATELET # BLD AUTO: 367 10E3/UL (ref 150–450)
POTASSIUM SERPL-SCNC: 3.8 MMOL/L (ref 3.4–5.3)
RBC # BLD AUTO: 4.88 10E6/UL (ref 4.4–5.9)
SODIUM SERPL-SCNC: 141 MMOL/L (ref 135–145)
WBC # BLD AUTO: 8.2 10E3/UL (ref 4–11)

## 2023-10-09 PROCEDURE — 85025 COMPLETE CBC W/AUTO DIFF WBC: CPT

## 2023-10-09 PROCEDURE — 80048 BASIC METABOLIC PNL TOTAL CA: CPT

## 2023-10-09 PROCEDURE — 82043 UR ALBUMIN QUANTITATIVE: CPT

## 2023-10-09 PROCEDURE — 36415 COLL VENOUS BLD VENIPUNCTURE: CPT

## 2023-10-09 PROCEDURE — 82570 ASSAY OF URINE CREATININE: CPT

## 2023-10-10 ENCOUNTER — OFFICE VISIT (OUTPATIENT)
Dept: NEPHROLOGY | Facility: CLINIC | Age: 58
End: 2023-10-10
Payer: COMMERCIAL

## 2023-10-10 VITALS
HEIGHT: 70 IN | HEART RATE: 87 BPM | BODY MASS INDEX: 40.52 KG/M2 | DIASTOLIC BLOOD PRESSURE: 88 MMHG | SYSTOLIC BLOOD PRESSURE: 144 MMHG | OXYGEN SATURATION: 98 % | WEIGHT: 283 LBS | RESPIRATION RATE: 16 BRPM

## 2023-10-10 DIAGNOSIS — E66.01 CLASS 3 SEVERE OBESITY DUE TO EXCESS CALORIES WITHOUT SERIOUS COMORBIDITY WITH BODY MASS INDEX (BMI) OF 40.0 TO 44.9 IN ADULT (H): ICD-10-CM

## 2023-10-10 DIAGNOSIS — I10 HYPERTENSION, ESSENTIAL: Primary | ICD-10-CM

## 2023-10-10 DIAGNOSIS — E66.813 CLASS 3 SEVERE OBESITY DUE TO EXCESS CALORIES WITHOUT SERIOUS COMORBIDITY WITH BODY MASS INDEX (BMI) OF 40.0 TO 44.9 IN ADULT (H): ICD-10-CM

## 2023-10-10 LAB
CREAT UR-MCNC: 183 MG/DL
MICROALBUMIN UR-MCNC: <12 MG/L
MICROALBUMIN/CREAT UR: NORMAL MG/G{CREAT}

## 2023-10-10 PROCEDURE — 99214 OFFICE O/P EST MOD 30 MIN: CPT | Performed by: INTERNAL MEDICINE

## 2023-10-10 RX ORDER — AMLODIPINE BESYLATE 5 MG/1
5 TABLET ORAL DAILY
Qty: 30 TABLET | Refills: 11 | Status: SHIPPED | OUTPATIENT
Start: 2023-10-10 | End: 2023-12-07

## 2023-10-10 RX ORDER — CYANOCOBALAMIN 1000 UG/ML
1 INJECTION, SOLUTION INTRAMUSCULAR; SUBCUTANEOUS
Qty: 10 ML | Refills: 0 | Status: SHIPPED | OUTPATIENT
Start: 2023-10-10 | End: 2024-01-09

## 2023-10-10 ASSESSMENT — PAIN SCALES - GENERAL: PAINLEVEL: NO PAIN (0)

## 2023-10-10 NOTE — NURSING NOTE
"Chief Complaint   Patient presents with    RECHECK     Hypertension, essential       Vitals:    10/10/23 0818   BP: (!) 151/89   BP Location: Left arm   Patient Position: Sitting   Cuff Size: Adult Large   Pulse: 87   Resp: 16   SpO2: 98%   Weight: 128.4 kg (283 lb)   Height: 1.778 m (5' 10\")       Body mass index is 40.61 kg/m .    "

## 2023-10-10 NOTE — PROGRESS NOTES
"Nephrology Outpatient Visit Note    Chief Complain/Reason for Visit  Recent episode of KAZ    History of Present Illness  This is a 58 year old male who presents for follow-up appointment.  Please see my colleague 's note dated 6/6/2023 for details.  Briefly, he presented  in June of 2023 for evaluation of acute kidney injury.  His kidney function fluctuated quite a bit since April of 2023.     His kidney function is significantly improved, and acute kidney injury has resolved.  His most recent laboratory evaluation was from yesterday.  His renal panel revealed a creatinine of 1.1 mg/dL with an estimated GFR of 74 mL/min.  His serum sodium, potassium, chloride, bicarbonate, and calcium are normal.  His CBC is normal showing hemoglobin of 14.6.  He had a urinalysis from August 2023 that was normal.    Overall, the patient is doing well today.  No acute symptoms reported today.  No chest pain or difficulty breathing.  He states that his checks his blood pressure at home occasionally and its been running \"high\".  He does not recall the readings.  Blood pressure today is 144/88.    The following portions of the patient's history were reviewed and updated as appropriate: allergies, current medications, past family history, past medical history, past social history, past surgical history and problem list.    Renal ultrasound 6/8  1.  No evidence of a hemodynamically significant renal artery  stenosis.  2.  There is a rounded hypoechoic structure in the left mid pole renal  cortex measuring 2.1 x 1.7 x 1.8 cm. Finding may represent a dromedary  hump or proteinaceous cystic structure however cannot rule out a solid  mass. Recommend CT renal mass protocol for further evaluation.  3.  Incidentally noted hepatic steatosis.    Review of Systems  A comprehensive review of systems was performed. Pertinent positives and negatives are described above.    Social and Family History  Patient reports that he has never " "smoked. He has never been exposed to tobacco smoke. He has never used smokeless tobacco. He reports current alcohol use. He reports that he does not use drugs.  family history includes Cancer in his brother and sister; Coronary Artery Disease in his father; Hypertension in his brother and mother.    Vital Signs  Blood pressure (!) 144/88, pulse 87, resp. rate 16, height 1.778 m (5' 10\"), weight 128.4 kg (283 lb), SpO2 98 %. Body mass index is 40.61 kg/m .    Physical Examination    Objective   I reviewed pertinent laboratory workup and imaging findings.  Lab Results   Component Value Date    WBC 8.2 10/09/2023    HGB 14.6 10/09/2023    HCT 43.0 10/09/2023    MCV 88 10/09/2023     10/09/2023     10/09/2023    BUN 14.5 10/09/2023    ANIONGAP 15 10/09/2023    ALBUMIN 4.6 07/10/2023     Lab Results   Component Value Date    UROBILINOGEN 0.2 08/17/2023     Current Outpatient Medications   Medication    amLODIPine (NORVASC) 5 MG tablet    atorvastatin (LIPITOR) 40 MG tablet    cyanocobalamin (CYANOCOBALAMIN) 1000 MCG/ML injection    hydrochlorothiazide (HYDRODIURIL) 25 MG tablet    Alcohol Swabs PADS    syringe 22G X 1-1/2\" 3 ML MISC     Current Facility-Administered Medications   Medication    cyanocobalamin injection 1,000 mcg     Assessment & Plan   Patient Active Problem List   Diagnosis    Hypertension, benign essential, goal below 140/90    BMI >35 with HTN (H)    Sacroiliac joint pain    Prediabetes    Vitamin B12 deficiency (non anemic)    Elevated serum creatinine    Hyperlipidemia LDL goal <100    Acute pain of left knee     The patient had acute kidney injury in June of 2023 likely due to the combination of NSAIDs use as well as antihypertensive medications.  His KAZ has resolved.  However, he is hypertensive.    In terms of management today, I discussed with the patient starting amlodipine for blood pressure management.  I discussed with him potential side effects including edema.  The patient is " agreeable to start.  I instructed him to watch his blood pressure at home.  I also educated him with regards to risk factor for chronic kidney disease.  I instructed him to continue to avoid NSAIDs.  I discussed with him the importance of attempting to lose weight and I referred him to the weight management clinic.    During a previous visit, I referred him to urology given a left renal complex cysts noted on renal ultrasound.     My recommendations are the following:  - Continue hydrochlorothiazide. Start amlodipine 5 mg orally daily  - Check your blood pressure at home. Goal blood pressure is 130/80. Call my office if your blood pressure readings are running higher than that.  - Referral to weight management clinic  - Recommend to check kidney function on a yearly basis. This can be done through your primary care physician.  - No need to come back to nephrology as long as you see your primary care doctor on a regular basis.     Total time was of this encounter on the date of service was 30 minutes. All questions were answered to the patient's satisfaction.  Chart documentation was completed, in part, with [employer] voice-recognition software. Even though reviewed, some grammatical, spelling, and word errors may remain.    Orders placed today:  Orders Placed This Encounter   Procedures    Adult Comprehensive Weight Management  Referral

## 2023-10-10 NOTE — PATIENT INSTRUCTIONS
It was a pleasure taking care of you today.  I've included a brief summary of our discussion and care plan from today's visit below.  Please review this information with your primary care provider.    My recommendations are summarized as follows:  - Start amlodipine 5 mg orally daily  - Check your blood pressure at home. Goal blood pressure is 130/80. Call my office if your blood pressure readings are running higher than that.  - Referral to weight management clinic  - No need to come back to nephrology as long as you see your primary care doctor on a regular basis.     Who do I call with any questions after my visit?  Please be in touch if there are any further questions that arise following today's visit.  There are multiple ways to contact your nephrology care team.      During business hours, you may reach your Nephrology RN Care Coordinator, Zainab Gomez at . To schedule or reschedule an appointment, please call [phone]. To schedule imaging please call [phone]. To schedule your lab appointment at [address] lab call [phone].    You can always send a secure message through [employer].  You[employer] messages are answered by your nurse or doctor typically within 24 hours.  Please allow extra time on weekends and holidays.      For urgent/emergent questions after business hours, you may reach the on-call Nephrology Fellow by contacting the [facility]  at [phone].     How will I get the results of any tests ordered?    You will receive all of your results.  If you have signed up for [employer], any tests ordered at your visit will be available to you after your physician reviews them.  Typically this takes 1-2 weeks.  If there are urgent results that require a change in your care plan, your physician or nurse will call you to discuss the next steps.      What is Youcruit?  Youcruit is a secure way for you to access all of your healthcare records from the  [facility].  It is a web based computer program, so you can sign on to it from any location.  It also allows you to send secure messages to your care team.  I recommend signing up for [employer] access if you have not already done so and are comfortable with using a computer.      How do I schedule a follow-up visit?  If you did not schedule a follow-up visit today, please call [phone] to schedule a follow-up office visit.      Sincerely,    Donald Acosta MD  [facility]  Division of Nephrology and Hypertension

## 2023-10-13 ENCOUNTER — MYC REFILL (OUTPATIENT)
Dept: FAMILY MEDICINE | Facility: CLINIC | Age: 58
End: 2023-10-13
Payer: COMMERCIAL

## 2023-10-13 DIAGNOSIS — E53.8 VITAMIN B12 DEFICIENCY (NON ANEMIC): ICD-10-CM

## 2023-10-17 RX ORDER — SYRINGE W-NEEDLE,DISPOSAB,3 ML 25GX5/8"
1 SYRINGE, EMPTY DISPOSABLE MISCELLANEOUS
Qty: 50 EACH | Refills: 11 | Status: SHIPPED | OUTPATIENT
Start: 2023-10-17 | End: 2024-01-03

## 2023-10-31 ENCOUNTER — THERAPY VISIT (OUTPATIENT)
Dept: PHYSICAL THERAPY | Facility: CLINIC | Age: 58
End: 2023-10-31
Payer: COMMERCIAL

## 2023-10-31 DIAGNOSIS — M25.562 ACUTE PAIN OF LEFT KNEE: Primary | ICD-10-CM

## 2023-10-31 PROCEDURE — 97110 THERAPEUTIC EXERCISES: CPT | Mod: GP | Performed by: PHYSICAL THERAPIST

## 2023-10-31 PROCEDURE — 97530 THERAPEUTIC ACTIVITIES: CPT | Mod: GP | Performed by: PHYSICAL THERAPIST

## 2023-12-07 DIAGNOSIS — I10 HYPERTENSION, ESSENTIAL: ICD-10-CM

## 2023-12-07 DIAGNOSIS — E66.813 CLASS 3 SEVERE OBESITY DUE TO EXCESS CALORIES WITHOUT SERIOUS COMORBIDITY WITH BODY MASS INDEX (BMI) OF 40.0 TO 44.9 IN ADULT (H): ICD-10-CM

## 2023-12-07 DIAGNOSIS — E66.01 CLASS 3 SEVERE OBESITY DUE TO EXCESS CALORIES WITHOUT SERIOUS COMORBIDITY WITH BODY MASS INDEX (BMI) OF 40.0 TO 44.9 IN ADULT (H): ICD-10-CM

## 2023-12-07 RX ORDER — AMLODIPINE BESYLATE 5 MG/1
5 TABLET ORAL DAILY
Qty: 30 TABLET | Refills: 11 | Status: CANCELLED | OUTPATIENT
Start: 2023-12-07

## 2023-12-07 NOTE — TELEPHONE ENCOUNTER
Please note pharmacy, medication sent in October was sent to local [facility], [facility]. Down to last week-

## 2023-12-08 ENCOUNTER — MYC MEDICAL ADVICE (OUTPATIENT)
Dept: FAMILY MEDICINE | Facility: CLINIC | Age: 58
End: 2023-12-08
Payer: COMMERCIAL

## 2023-12-09 NOTE — TELEPHONE ENCOUNTER
", would you be willing to authorize this refill (change of pharmacy) for patient. It has been ordered by nephrology only so far, so I don't think I can authorize. Nephrology saw patient 10/10/23 and notes say \"\"No need to come back to nephrology as long as you see your primary care doctor on a regular basis.\" Patient just needs this sent to another pharmacy. I tried to send it to nephrologist but it has not been addressed yet.    Tamica Cabrera RN BSN  [facility]     "

## 2023-12-10 RX ORDER — AMLODIPINE BESYLATE 5 MG/1
5 TABLET ORAL DAILY
Qty: 90 TABLET | Refills: 1 | Status: SHIPPED | OUTPATIENT
Start: 2023-12-10 | End: 2024-03-23

## 2023-12-18 NOTE — PROGRESS NOTES
CHIEF COMPLAINT:  Pain of the Left Knee     HISTORY OF PRESENT ILLNESS  Mr. Zavala is a pleasant 58 year old year old male who presents to clinic today with L knee pain that has been ongoing since Aug 2023; patient was seen in UC and referred to PT, which did not help much.  He has also tried rest, ice, tylenol.  Aubrey explains that it started overnight with insidious onset. He notes it increased during a trip to [place].    Aubrey also shares that he has bilateral ankle pain. L ankle  is painful over the distal insertion of the achilles and has worsened as his knee pain has worsened.     Onset: sudden, worsening  Location: left knee; lateral joint line  Quality:  aching, dull, and sharp; pain is constant  Duration: 4 months   Severity: 8/10 at worst  Timing:constant  Modifying factors:  resting and non-use makes it better, movement and use makes it worse  Associated signs & symptoms: pain and swelling; numbness in bilateral toes/feet  Previous similar pain: No  Treatments to date: UC, imaging, PT, tylenol,    Additional history: as documented    Review of Systems:  Have you recently had a a fever, chills, weight loss? No  Do you have any vision problems? No  Do you have any chest pain or edema? No  Do you have any shortness of breath or wheezing?  No  Do you have stomach problems? No  Do you have any numbness or focal weakness? No  Do you have diabetes? No  Do you have problems with bleeding or clotting? No  Do you have an rashes or other skin lesions? No    MEDICAL HISTORY  Patient Active Problem List   Diagnosis    Hypertension, benign essential, goal below 140/90    BMI >35 with HTN (H)    Sacroiliac joint pain    Prediabetes    Vitamin B12 deficiency (non anemic)    Elevated serum creatinine    Hyperlipidemia LDL goal <100    Acute pain of left knee       Current Outpatient Medications   Medication Sig Dispense Refill    Alcohol Swabs PADS 1 each every 14 days 100 each 0    amLODIPine (NORVASC) 5 MG tablet  "Take 1 tablet (5 mg) by mouth daily 90 tablet 1    atorvastatin (LIPITOR) 40 MG tablet Take 1 tablet (40 mg) by mouth daily 90 tablet 3    cyanocobalamin (CYANOCOBALAMIN) 1000 MCG/ML injection Inject 1 mL (1,000 mcg) into the muscle every 14 days 10 mL 0    hydrochlorothiazide (HYDRODIURIL) 25 MG tablet Take 1 tablet (25 mg) by mouth daily 90 tablet 3    syringe 22G X 1-1/2\" 3 ML MISC 1 each every 14 days 50 each 11       Allergies   Allergen Reactions    No Known Drug Allergy        Family History   Problem Relation Age of Onset    Hypertension Mother     Coronary Artery Disease Father     Cancer Brother         MENs disease    Hypertension Brother     Cancer Sister         MENs disease       Additional medical/Social/Surgical histories reviewed in [facility] and updated as appropriate.       PHYSICAL EXAM  There were no vitals taken for this visit.    General  - normal appearance, in no obvious distress  Musculoskeletal - left knee  - stance: mildly antalgic gait, mild genu varum  - inspection: trace effusion  - palpation: medial joint line tenderness  - ROM: 110 degrees flexion, 0 degrees extension, painful active ROM  - strength: 5/5 in flexion, 5/5 in extension  - special tests:  (-) Aurora  (-) varus at 0 and 30 degrees flexion  (-) valgus at 0 and 30 degrees flexion  General  - normal appearance, in no obvious distress  HEENT  - conjunctivae not injected, moist mucous membranes  CV  - normal pulses at posterior tib and dorsalis pedis  Pulm  - normal respiratory pattern, non-labored  Musculoskeletal - foot / ankle  - stance: normal gait without limp, normal stance without excessive pronation, normal heel inversion with standing heel raise, no obvious leg length discrepancy, normal heel and toe walk  - inspection: No thickening Achilles tendon,  normal bone and joint alignment, no obvious deformity  - palpation: tender over the medial terminal insertion of the Achilles  - ROM: limited passive dorsiflexion, normal " plantar flexion, inversion, and eversion  - strength: 5/5 in all planes  Neuro  - no sensory or motor deficit, grossly normal coordination, normal muscle tone  Skin  - no ecchymosis, erythema, warmth, or induration, no obvious rash  Psych  - interactive, appropriate, normal mood and affect    IMAGING : XR knee left 3 views. Final results and radiologist's interpretation, available in the [facility] health record. Images were reviewed with the patient/family members in the office today. My personal interpretation of the performed imaging is moderate medial and patellofemoral compartment degenerative changes     ASSESSMENT & PLAN  Mr. Zavala is a 58 year old year old male who presents to clinic today with acutely worsening knee pain in the setting of more chronic left knee pain since August.    Likely component of DJD as culprit for pain. Catching/pop within knee possible loose body.  Degenerative meniscus tear also a consideration.    Additional posterior ankle pain over the past 6+ months secondary to achilles tendinopathy.    Diagnosis:   Primary osteoarthritis of left knee  Chronic pain of left knee  Achilles tendinopathy     -MRI left knee given lack of response to formal Physical Therapy and referrral by PT given inability to complete sessions due to pain  -Consideration for injections if indeed and MRI does demonstrate moderate or greater DJD  -Weight mananagement reviewed  -Heel lifts for achilles/increasing  heel drop shoes recommended  -Eccentric based achilles tendon program, stretching  -Follow up after MRI    It was a pleasure seeing Aubrey today.    Solomon Watkins DO, CAM  Primary Care Sports Medicine

## 2023-12-20 ENCOUNTER — OFFICE VISIT (OUTPATIENT)
Dept: ORTHOPEDICS | Facility: CLINIC | Age: 58
End: 2023-12-20
Attending: PHYSICIAN ASSISTANT
Payer: COMMERCIAL

## 2023-12-20 DIAGNOSIS — M25.562 ACUTE PAIN OF LEFT KNEE: Primary | ICD-10-CM

## 2023-12-20 PROCEDURE — 99204 OFFICE O/P NEW MOD 45 MIN: CPT | Performed by: FAMILY MEDICINE

## 2023-12-20 NOTE — LETTER
12/20/2023         RE: Aubrey Zavala  [address]  [place] [facility] [address]        Dear Colleague,    Thank you for referring your patient, Aubrey Zavala, to the [facility] [place]. Please see a copy of my visit note below.    CHIEF COMPLAINT:  Pain of the Left Knee     HISTORY OF PRESENT ILLNESS  Mr. Zavala is a pleasant 58 year old year old male who presents to clinic today with L knee pain that has been ongoing since Aug 2023; patient was seen in UC and referred to PT, which did not help much.  He has also tried rest, ice, tylenol.  Aubrey explains that it started overnight with insidious onset. He notes it increased during a trip to [place].    Aubrey also shares that he has bilateral ankle pain. L ankle  is painful over the distal insertion of the achilles and has worsened as his knee pain has worsened.     Onset: sudden, worsening  Location: left knee; lateral joint line  Quality:  aching, dull, and sharp; pain is constant  Duration: 4 months   Severity: 8/10 at worst  Timing:constant  Modifying factors:  resting and non-use makes it better, movement and use makes it worse  Associated signs & symptoms: pain and swelling; numbness in bilateral toes/feet  Previous similar pain: No  Treatments to date: UC, imaging, PT, tylenol,    Additional history: as documented    Review of Systems:  Have you recently had a a fever, chills, weight loss? No  Do you have any vision problems? No  Do you have any chest pain or edema? No  Do you have any shortness of breath or wheezing?  No  Do you have stomach problems? No  Do you have any numbness or focal weakness? No  Do you have diabetes? No  Do you have problems with bleeding or clotting? No  Do you have an rashes or other skin lesions? No    MEDICAL HISTORY  Patient Active Problem List   Diagnosis     Hypertension, benign essential, goal below 140/90     BMI >35 with HTN (H)     Sacroiliac joint pain     Prediabetes     Vitamin B12  "deficiency (non anemic)     Elevated serum creatinine     Hyperlipidemia LDL goal <100     Acute pain of left knee       Current Outpatient Medications   Medication Sig Dispense Refill     Alcohol Swabs PADS 1 each every 14 days 100 each 0     amLODIPine (NORVASC) 5 MG tablet Take 1 tablet (5 mg) by mouth daily 90 tablet 1     atorvastatin (LIPITOR) 40 MG tablet Take 1 tablet (40 mg) by mouth daily 90 tablet 3     cyanocobalamin (CYANOCOBALAMIN) 1000 MCG/ML injection Inject 1 mL (1,000 mcg) into the muscle every 14 days 10 mL 0     hydrochlorothiazide (HYDRODIURIL) 25 MG tablet Take 1 tablet (25 mg) by mouth daily 90 tablet 3     syringe 22G X 1-1/2\" 3 ML MISC 1 each every 14 days 50 each 11       Allergies   Allergen Reactions     No Known Drug Allergy        Family History   Problem Relation Age of Onset     Hypertension Mother      Coronary Artery Disease Father      Cancer Brother         MENs disease     Hypertension Brother      Cancer Sister         MENs disease       Additional medical/Social/Surgical histories reviewed in [facility] and updated as appropriate.       PHYSICAL EXAM  There were no vitals taken for this visit.    General  - normal appearance, in no obvious distress  Musculoskeletal - left knee  - stance: mildly antalgic gait, mild genu varum  - inspection: trace effusion  - palpation: medial joint line tenderness  - ROM: 110 degrees flexion, 0 degrees extension, painful active ROM  - strength: 5/5 in flexion, 5/5 in extension  - special tests:  (-) Aurora  (-) varus at 0 and 30 degrees flexion  (-) valgus at 0 and 30 degrees flexion  General  - normal appearance, in no obvious distress  HEENT  - conjunctivae not injected, moist mucous membranes  CV  - normal pulses at posterior tib and dorsalis pedis  Pulm  - normal respiratory pattern, non-labored  Musculoskeletal - foot / ankle  - stance: normal gait without limp, normal stance without excessive pronation, normal heel inversion with standing heel " raise, no obvious leg length discrepancy, normal heel and toe walk  - inspection: No thickening Achilles tendon,  normal bone and joint alignment, no obvious deformity  - palpation: tender over the medial terminal insertion of the Achilles  - ROM: limited passive dorsiflexion, normal plantar flexion, inversion, and eversion  - strength: 5/5 in all planes  Neuro  - no sensory or motor deficit, grossly normal coordination, normal muscle tone  Skin  - no ecchymosis, erythema, warmth, or induration, no obvious rash  Psych  - interactive, appropriate, normal mood and affect    IMAGING : XR knee left 3 views. Final results and radiologist's interpretation, available in the [facility] health record. Images were reviewed with the patient/family members in the office today. My personal interpretation of the performed imaging is moderate medial and patellofemoral compartment degenerative changes     ASSESSMENT & PLAN  Mr. Zavala is a 58 year old year old male who presents to clinic today with acutely worsening knee pain in the setting of more chronic left knee pain since August.    Likely component of DJD as culprit for pain. Catching/pop within knee possible loose body.  Degenerative meniscus tear also a consideration.    Additional posterior ankle pain over the past 6+ months secondary to achilles tendinopathy.    Diagnosis:   Primary osteoarthritis of left knee  Chronic pain of left knee  Achilles tendinopathy     -MRI left knee given lack of response to formal Physical Therapy and referrral by PT given inability to complete sessions due to pain  -Consideration for injections if indeed and MRI does demonstrate moderate or greater DJD  -Weight mananagement reviewed  -Heel lifts for achilles/increasing  heel drop shoes recommended  -Eccentric based achilles tendon program, stretching  -Follow up after MRI    It was a pleasure seeing Aubrey today.    Solomon Watkins DO, [facility]  Primary Care Sports Medicine      Again, thank you for  allowing me to participate in the care of your patient.        Sincerely,        Solomon Watkins, DO

## 2023-12-20 NOTE — PATIENT INSTRUCTIONS
DR GLOVER'S CLINIC LOCATIONS  [facility] ([facility])   [address] [facility], [address]th [place], [address] [place] [address]   [phone]       APPOINTMENTS: [phone] PHYSICAL THERAPY: [phone]   CARE QUESTIONS: [phone], RADIOLOGY: [phone]   BILLING QUESTIONS: [phone]    FAX NUMBER: [phone]        Follow up: for MRI results-early Sundeep      Achilles Tendinopathy     Treating Achilles tendonitis conservatively is possible. Foam roller massage, eccentric heel drops, heel lifts, icing after runs, heating up beforehand, more supportive shoe features, strengthening and mobility exercises for the ankle,     So what makes a good shoe for Achilles pain?      The key is minimizing stress on the tendon, and that requires excellent heel cushioning, plenty of stability, decent arch support, and a high heel-to-toe-drop.      Heel lift options - Powerstep Adjustable Heel Lift Cushion     Best Exercises?   Stretching -  -Standing Calf Stretch   -Standing Soleus Stretch     Eccentric achilles strengthening  -   - Morning - 15 reps x 3 sets with knee straight, then again knee bent slightly  - Evening - 15 reps x 3 sets with knee straight, then again knee bent slightly     Dynamic Warmup before Soccer -  -Try this one: [email or web address]     Stretches after Soccer  -Standing calf and soleus stretch on wall  -Towel Stretch     HOW CAN I TAKE CARE OF MYSELF?     To keep swelling down and help relieve pain:     Put an ice pack, gel pack, or package of frozen vegetables wrapped in a cloth on the injured area every 3 to 4 hours for up to 20 minutes at a time.  Do ice massage. To do this, freeze water in a Styrofoam cup, then peel the top of the cup away to expose the ice. Hold the bottom of the cup and rub the ice over the painful area for 5 to 10 minutes. Do this several times a day while you have pain.  Keep  your foot up on pillows when you sit or lie down.     Take nonprescription pain medicine, such as acetaminophen, ibuprofen, or naproxen. Nonsteroidal anti-inflammatory medicines (NSAIDs), such as ibuprofen and naproxen, may cause stomach bleeding and other problems. These risks increase with age. Read the label and take as directed. Unless recommended by your healthcare provider, you should not take this medicine for more than 10 days.     Moist heat may help relieve pain, relax your muscles, and make it easier to use your ankle. Put moist heat on the injured area for 10 to 15 minutes before you do warm-up and stretching exercises. Moist heat includes heat patches or moist heating pads that you can buy at most drugstores, a warm, wet washcloth, or a hot shower. To prevent burns to your skin, follow directions on the package and do not lie on any type of hot pad. Don t use heat if you have swelling.     HOW CAN I HELP PREVENT AN ACHILLES TENDON PROBLEM?      Warm-up exercises and stretching before activities can help prevent injuries. If you have tight Achilles tendons or calf muscles, stretch them twice a day whether or not you are doing any activities that day. If your leg or ankle hurts after exercise, putting ice on it may help keep it from getting injured.     Avoid running uphill if you tend to have Achilles tendon injuries.     Follow the safety rules and use the protective equipment recommended for your work or sport.     Achilles Tendonitis Exercises     You can do the towel stretch right away. When the towel stretch is easy, try the standing calf stretch, soleus stretch, and leg lift. When you no longer have sharp pain in your calf or tendon, you can do the step-up, heel raises, and static and balance and reach exercises.     Towel stretch: Sit on a hard surface with your injured leg stretched out in front of you. Loop a towel around your toes and the ball of your foot and pull the towel toward your body  keeping your leg straight. Hold this position for 15 to 30 seconds and then relax. Repeat 3 times.     Standing calf stretch: Stand facing a wall with your hands on the wall at about eye level. Keep your injured leg back with your heel on the floor. Keep the other leg forward with the knee bent. Turn your back foot slightly inward (as if you were pigeon-toed). Slowly lean into the wall until you feel a stretch in the back of your calf. Hold the stretch for 15 to 30 seconds. Return to the starting position. Repeat 3 times. Do this exercise several times each day.     Standing soleus stretch: Stand facing a wall with your hands on the wall at about chest height. Keep your injured leg back with your heel on the floor. Keep the other leg forward with the knee bent. Turn your back foot slightly inward (as if you were pigeon-toed). Bend your back knee slightly and gently lean into the wall until you feel a stretch in the lower calf of your injured leg. Hold the stretch for 15 to 30 seconds. Return to the starting position. Repeat 3 times.     Side-lying leg lift: Lie on your uninjured side. Tighten the front thigh muscles on your injured leg and lift that leg 8 to 10 inches (20 to 25 centimeters) away from the other leg. Keep the leg straight and lower it slowly. Do 2 sets of 15.     Step-up: Stand with the foot of your injured leg on a support 3 to 5 inches (8 to 13 centimeters) high --like a small step or block of wood. Keep your other foot flat on the floor. Shift your weight onto the injured leg on the support. Straighten your injured leg as the other leg comes off the floor. Return to the starting position by bending your injured leg and slowly lowering your uninjured leg back to the floor. Do 2 sets of 15.     Eccentric calf strengthening: Stand behind a chair or counter with your feet flat on the floor. Using the chair or counter as a support to help you, raise your body up onto your toes and hold for 5 seconds.  Then slowly lower yourself down with your injured leg only. (It's OK to keep holding onto the support if you need to.) Repeat 15 times. Do 2 sets of 15. Rest 30 seconds between sets.     Single leg balance exercises: Stand next to a chair with your injured leg farther from the chair. The chair will provide support if you need it. Stand on the foot of your injured leg and bend your knee slightly. Try to raise the arch of this foot while keeping your big toe on the floor. Keep your foot in this position.     While keeping your arch raised, reach the hand that is farther away from the chair across your body toward the chair. The farther you reach, the more challenging the exercise. Do 2 sets of 15.     Published by [employer].  Copyright  2014 [employer] and/or one of its subsidiaries. All rights reserved.           1. Acute pain of left knee

## 2023-12-21 ENCOUNTER — ANCILLARY PROCEDURE (OUTPATIENT)
Dept: MRI IMAGING | Facility: CLINIC | Age: 58
End: 2023-12-21
Attending: FAMILY MEDICINE
Payer: COMMERCIAL

## 2023-12-21 DIAGNOSIS — M25.562 ACUTE PAIN OF LEFT KNEE: ICD-10-CM

## 2023-12-21 PROCEDURE — 73721 MRI JNT OF LWR EXTRE W/O DYE: CPT | Mod: LT

## 2024-01-03 ENCOUNTER — MYC REFILL (OUTPATIENT)
Dept: FAMILY MEDICINE | Facility: CLINIC | Age: 59
End: 2024-01-03
Payer: COMMERCIAL

## 2024-01-03 DIAGNOSIS — E53.8 VITAMIN B12 DEFICIENCY (NON ANEMIC): ICD-10-CM

## 2024-01-03 RX ORDER — SYRINGE W-NEEDLE,DISPOSAB,3 ML 25GX5/8"
1 SYRINGE, EMPTY DISPOSABLE MISCELLANEOUS
Qty: 50 EACH | Refills: 11 | Status: SHIPPED | OUTPATIENT
Start: 2024-01-03 | End: 2024-02-14

## 2024-01-09 ENCOUNTER — MYC REFILL (OUTPATIENT)
Dept: FAMILY MEDICINE | Facility: CLINIC | Age: 59
End: 2024-01-09
Payer: COMMERCIAL

## 2024-01-09 DIAGNOSIS — E53.8 VITAMIN B12 DEFICIENCY (NON ANEMIC): ICD-10-CM

## 2024-01-10 RX ORDER — CYANOCOBALAMIN 1000 UG/ML
1 INJECTION, SOLUTION INTRAMUSCULAR; SUBCUTANEOUS
Qty: 10 ML | Refills: 0 | Status: SHIPPED | OUTPATIENT
Start: 2024-01-10 | End: 2024-05-15

## 2024-01-15 ENCOUNTER — TELEPHONE (OUTPATIENT)
Dept: ORTHOPEDICS | Facility: CLINIC | Age: 59
End: 2024-01-15

## 2024-01-15 ENCOUNTER — MYC MEDICAL ADVICE (OUTPATIENT)
Dept: FAMILY MEDICINE | Facility: CLINIC | Age: 59
End: 2024-01-15

## 2024-01-15 ENCOUNTER — VIRTUAL VISIT (OUTPATIENT)
Dept: FAMILY MEDICINE | Facility: CLINIC | Age: 59
End: 2024-01-15
Payer: COMMERCIAL

## 2024-01-15 DIAGNOSIS — R55 VASOVAGAL SYNCOPE: Primary | ICD-10-CM

## 2024-01-15 DIAGNOSIS — M17.12 PRIMARY OSTEOARTHRITIS OF LEFT KNEE: Primary | ICD-10-CM

## 2024-01-15 PROCEDURE — 99207 PR NO CHARGE LOS: CPT | Mod: 95 | Performed by: INTERNAL MEDICINE

## 2024-01-15 NOTE — PROGRESS NOTES
Aubrey is a 59 year old who is being evaluated via a billable video visit.      How would you like to obtain your AVS? MyChart  If the video visit is dropped, the invitation should be resent by: Text to cell phone: [phone]  Will anyone else be joining your video visit? Yes: Wife. How would they like to receive their invitation? Text to cell phone: [phone]          Assessment & Plan     Vasovagal syncope  He was with his wife at her doctor's appointment  She was seeing an orthopedist she will  They removed a cast  Looking at the leg he did not like the way it looked  He became very warm  Started having sweat  Then his eyes rolled back  And he temporarily passed out for a minute also EMS was called in  He came around in a minute and the physician there asked him some questions and he responded appropriately  He was taken to the next room  EKG was done  Blood pressure was taken  He does not know the readings of the blood pressure  Apparently he was told that EKG showed some block  This happened twice before once when he was in [place] visiting some [facility] and all the time when he was doing some work describing where it was very hot  No history of any seizures  No history of any head injuries in the past  No  family history of any seizures  It sounds like he had a vasovagal episode from emotion  However we have to check his vitals  Obtain an EKG  Needs physical examination and also might need a echocardiogram  All this cannot be accomplished on a virtual visit  I advised him to monitor his blood pressures at home sitting and standing and come with those recordings for a physical visit either to me or to his PCP  This visit will not be charged      30 minutes spent by me on the date of the encounter doing chart review, history and exam, documentation and further activities per the note       BMI:   Estimated body mass index is 40.61 kg/m  as calculated from the following:    Height as of 10/10/23: 1.778 m  "(5' 10\").    Weight as of 10/10/23: 128.4 kg (283 lb).           Austin Zapata MD  [facility] KHANH Burgos is a 59 year old, presenting for the following health issues:  Follow Up (Blacked out when at an appointment with Wife while they were changing wound dressing, unsure if this due to the wound/blood or if he having seizure activity )      1/15/2024     3:01 PM   Additional Questions   Roomed by Mackenzie BARBOZA   Accompanied by Wife       History of Present Illness       Reason for visit:  Follow up from Fainting spell    He eats 2-3 servings of fruits and vegetables daily.He consumes 0 sweetened beverage(s) daily.He exercises with enough effort to increase his heart rate 9 or less minutes per day.  He exercises with enough effort to increase his heart rate 3 or less days per week.   He is taking medications regularly.       PT would like to discuss next steps after fainting this morning.  Paramedics suggested that further testing was needed as EKG was abnormal.             Review of Systems   Constitutional, HEENT, cardiovascular, pulmonary, gi and gu systems are negative, except as otherwise noted.      Objective           Vitals:  No vitals were obtained today due to virtual visit.    Physical Exam   GENERAL: Healthy, alert and no distress  EYES: Eyes grossly normal to inspection.  No discharge or erythema, or obvious scleral/conjunctival abnormalities.  RESP: No audible wheeze, cough, or visible cyanosis.  No visible retractions or increased work of breathing.    SKIN: Visible skin clear. No significant rash, abnormal pigmentation or lesions.  NEURO: Cranial nerves grossly intact.  Mentation and speech appropriate for age.  PSYCH: Mentation appears normal, affect normal/bright, judgement and insight intact, normal speech and appearance well-groomed.                Video-Visit Details    Type of service:  Video Visit     Originating Location (pt. Location): Home    Distant Location " (provider location):  Off-site  Platform used for Video Visit: Mark

## 2024-01-15 NOTE — TELEPHONE ENCOUNTER
Patient had MRI completed 12/21/23 and provided communicated results and recommendations via [employer] results message. Provider mentioned both cortisone and gel injections. Please advise if prior auth should be submitted prior to upcoming appointment on 1/31/24 and if so, what the diagnosis is.     Manisha Carr, ATC

## 2024-01-15 NOTE — TELEPHONE ENCOUNTER
[facility] Call Center    Phone Message    May a detailed message be left on voicemail: no     Reason for Call: PILAR Scheduled for injection on 01/31

## 2024-01-16 NOTE — TELEPHONE ENCOUNTER
Sycopal episode yesterday following wife's dressing change at ortho office. See virtual visit notes of 1/15/24.    States he feels fine now.    Patient states EMS identified a bundle branch block on his EKG and recommended he see his primary to follow up.    Appt already scheduled for Monday with Art Blackwoodkami for follow up syncopal episode.  Routine wellness exam scheduled in May with pcp Dr. Alyssia Abreu.    Advised to contact the clinic if he develops any further symptoms.    Nora De La Garza RN, BSN, PHN  [facility]  [phone]

## 2024-01-18 NOTE — TELEPHONE ENCOUNTER
Received message from PA team stating that per patient's insurance, preferred products are Durolane, Euflexxa or Gelsyn-3.    New order pending for Durolane.    Catrachita De La Torre MBA, ATC

## 2024-01-22 ENCOUNTER — OFFICE VISIT (OUTPATIENT)
Dept: FAMILY MEDICINE | Facility: CLINIC | Age: 59
End: 2024-01-22
Attending: INTERNAL MEDICINE
Payer: COMMERCIAL

## 2024-01-22 ENCOUNTER — ORDERS ONLY (AUTO-RELEASED) (OUTPATIENT)
Dept: FAMILY MEDICINE | Facility: CLINIC | Age: 59
End: 2024-01-22
Payer: COMMERCIAL

## 2024-01-22 VITALS
BODY MASS INDEX: 40.83 KG/M2 | SYSTOLIC BLOOD PRESSURE: 128 MMHG | WEIGHT: 285.2 LBS | HEART RATE: 89 BPM | DIASTOLIC BLOOD PRESSURE: 82 MMHG | TEMPERATURE: 97.9 F | HEIGHT: 70 IN | RESPIRATION RATE: 17 BRPM | OXYGEN SATURATION: 95 %

## 2024-01-22 DIAGNOSIS — N18.2 CHRONIC KIDNEY DISEASE, STAGE II (MILD): ICD-10-CM

## 2024-01-22 DIAGNOSIS — E66.01 MORBID OBESITY (H): ICD-10-CM

## 2024-01-22 DIAGNOSIS — R55 VASOVAGAL SYNCOPE: Primary | ICD-10-CM

## 2024-01-22 DIAGNOSIS — R55 VASOVAGAL SYNCOPE: ICD-10-CM

## 2024-01-22 PROBLEM — N18.31 CHRONIC KIDNEY DISEASE, STAGE 3A (H): Status: RESOLVED | Noted: 2024-01-22 | Resolved: 2024-01-22

## 2024-01-22 PROBLEM — N18.31 CHRONIC KIDNEY DISEASE, STAGE 3A (H): Status: ACTIVE | Noted: 2024-01-22

## 2024-01-22 LAB
ANION GAP SERPL CALCULATED.3IONS-SCNC: 12 MMOL/L (ref 7–15)
BUN SERPL-MCNC: 18.1 MG/DL (ref 6–20)
CALCIUM SERPL-MCNC: 9.9 MG/DL (ref 8.6–10)
CHLORIDE SERPL-SCNC: 97 MMOL/L (ref 98–107)
CREAT SERPL-MCNC: 1.17 MG/DL (ref 0.67–1.17)
DEPRECATED HCO3 PLAS-SCNC: 28 MMOL/L (ref 22–29)
EGFRCR SERPLBLD CKD-EPI 2021: 72 ML/MIN/1.73M2
ERYTHROCYTE [DISTWIDTH] IN BLOOD BY AUTOMATED COUNT: 13.1 % (ref 10–15)
GLUCOSE SERPL-MCNC: 164 MG/DL (ref 70–99)
HBA1C MFR BLD: 5.6 % (ref 0–5.6)
HCT VFR BLD AUTO: 44.9 % (ref 40–53)
HGB BLD-MCNC: 15.1 G/DL (ref 13.3–17.7)
MCH RBC QN AUTO: 30.2 PG (ref 26.5–33)
MCHC RBC AUTO-ENTMCNC: 33.6 G/DL (ref 31.5–36.5)
MCV RBC AUTO: 90 FL (ref 78–100)
PLATELET # BLD AUTO: 343 10E3/UL (ref 150–450)
POTASSIUM SERPL-SCNC: 3.5 MMOL/L (ref 3.4–5.3)
RBC # BLD AUTO: 5 10E6/UL (ref 4.4–5.9)
SODIUM SERPL-SCNC: 137 MMOL/L (ref 135–145)
TSH SERPL DL<=0.005 MIU/L-ACNC: 2.51 UIU/ML (ref 0.3–4.2)
WBC # BLD AUTO: 8.3 10E3/UL (ref 4–11)

## 2024-01-22 PROCEDURE — 36415 COLL VENOUS BLD VENIPUNCTURE: CPT | Performed by: PHYSICIAN ASSISTANT

## 2024-01-22 PROCEDURE — 84443 ASSAY THYROID STIM HORMONE: CPT | Performed by: PHYSICIAN ASSISTANT

## 2024-01-22 PROCEDURE — 85027 COMPLETE CBC AUTOMATED: CPT | Performed by: PHYSICIAN ASSISTANT

## 2024-01-22 PROCEDURE — 83036 HEMOGLOBIN GLYCOSYLATED A1C: CPT | Performed by: PHYSICIAN ASSISTANT

## 2024-01-22 PROCEDURE — 80048 BASIC METABOLIC PNL TOTAL CA: CPT | Performed by: PHYSICIAN ASSISTANT

## 2024-01-22 PROCEDURE — 99214 OFFICE O/P EST MOD 30 MIN: CPT | Performed by: PHYSICIAN ASSISTANT

## 2024-01-22 ASSESSMENT — ENCOUNTER SYMPTOMS: SYNCOPE: 1

## 2024-01-22 ASSESSMENT — PAIN SCALES - GENERAL: PAINLEVEL: EXTREME PAIN (8)

## 2024-01-22 NOTE — PROGRESS NOTES
"  Assessment & Plan     (R55) Vasovagal syncope  (primary encounter diagnosis)  Comment:   Plan: CBC with platelets, Basic metabolic panel  (Ca,        Cl, CO2, Creat, Gluc, K, Na, BUN), TSH with         free T4 reflex, Hemoglobin A1c, Echocardiogram         Complete, ZIO PATCH MAIL OUT, CANCELED: ZIO         PATCH 8-14 DAYS (additional cost to patient),         CANCELED: ZIO PATCH 8-14 DAYS APPLICATION        Plan of care including echocardiogram and 14-day Holter monitor as well as blood work as above to rule out causes of syncope.  Based on manifestation, vasovagal seems most likely.  Patient advised of future plans for vasovagal syncope, often self resolving.  Stressed the importance of staying well-hydrated, pausing morning before standing up or standing up slowly to prevent orthostatic dizziness.    (E66.01) Morbid obesity (H)  Comment:   Plan: This comorbid condition increases medical complexity and medical decision making.  Patient with a strong family history of coronary artery disease, father had an MI in his 50s and brother passed away in 60, if any abnormalities consider cardiology and/or neurology follow-up for further evaluation of syncope.      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              BMI  Estimated body mass index is 40.38 kg/m  as calculated from the following:    Height as of this encounter: 1.79 m (5' 10.47\").    Weight as of this encounter: 129.4 kg (285 lb 3.2 oz).           Dick Burgos is a 59 year old, presenting for the following health issues:  Syncope      1/22/2024     7:57 AM   Additional Questions   Roomed by Lindsey MÁRQUEZ     Syncope     History of Present Illness       Reason for visit:  Follow up from Fainting spell    He eats 2-3 servings of fruits and vegetables daily.He consumes 0 sweetened beverage(s) daily.He exercises with enough effort to increase his heart " "rate 9 or less minutes per day.  He exercises with enough effort to increase his heart rate 3 or less days per week.   He is taking medications regularly.     Episode 1/15/23  He was with his wife at her doctor's appointment  She was seeing an orthopedist  They removed her cast  Looking at the leg he did not like the way it looked  He became very warm  Started having sweat  Then his eyes rolled back  And he temporarily passed out for a minute also EMS was called in  He came around in a minute and the physician there asked him some questions and he responded appropriately  He was taken to the next room  EKG was done - showing RBB block  Blood pressure was taken  He does not know the readings of the blood pressure  Apparently he was told that EKG showed some block  This happened twice before once when he was in [place] visiting some dungeons and all the time when 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  His blood pressure at home has been stable 130s over 90s, trying to stay more well-hydrated.  He reports that once he had a glass of water in the orthopedic office his color came back and he felt much improved.  Patient with a history of borderline stage II/IIIa CKD and hypertension, both have been improving over the past 6 months.                    Objective    BP (!) 156/95 (BP Location: Right arm, Patient Position: Sitting, Cuff Size: Adult Large)   Pulse 89   Temp 97.9  F (36.6  C) (Temporal)   Resp 17   Ht 1.79 m (5' 10.47\")   Wt 129.4 kg (285 lb 3.2 oz)   SpO2 95%   BMI 40.38 kg/m    Body mass index is 40.38 kg/m .  Physical Exam   GENERAL: alert and no distress  NECK: no adenopathy, no asymmetry, masses, or scars  RESP: lungs clear to auscultation - " no rales, rhonchi or wheezes  CV: regular rate and rhythm, normal S1 S2, no S3 or S4, no murmur, click or rub, no peripheral edema  ABDOMEN: soft, nontender, no hepatosplenomegaly, no masses and bowel sounds normal  MS: no gross musculoskeletal defects noted, no edema            Signed Electronically by: Art Castorena PA-C

## 2024-01-31 ENCOUNTER — OFFICE VISIT (OUTPATIENT)
Dept: ORTHOPEDICS | Facility: CLINIC | Age: 59
End: 2024-01-31
Payer: COMMERCIAL

## 2024-01-31 DIAGNOSIS — M17.12 PRIMARY OSTEOARTHRITIS OF LEFT KNEE: Primary | ICD-10-CM

## 2024-01-31 DIAGNOSIS — M23.304 DEGENERATION DISEASE OF MEDIAL MENISCUS, LEFT: ICD-10-CM

## 2024-01-31 PROCEDURE — 99213 OFFICE O/P EST LOW 20 MIN: CPT | Mod: 25 | Performed by: FAMILY MEDICINE

## 2024-01-31 PROCEDURE — 20610 DRAIN/INJ JOINT/BURSA W/O US: CPT | Mod: LT | Performed by: FAMILY MEDICINE

## 2024-01-31 RX ORDER — LIDOCAINE HYDROCHLORIDE 10 MG/ML
4 INJECTION, SOLUTION INFILTRATION; PERINEURAL
Status: DISCONTINUED | OUTPATIENT
Start: 2024-01-31 | End: 2024-05-15

## 2024-01-31 RX ORDER — TRIAMCINOLONE ACETONIDE 40 MG/ML
40 INJECTION, SUSPENSION INTRA-ARTICULAR; INTRAMUSCULAR
Status: DISCONTINUED | OUTPATIENT
Start: 2024-01-31 | End: 2024-05-15

## 2024-01-31 RX ADMIN — TRIAMCINOLONE ACETONIDE 40 MG: 40 INJECTION, SUSPENSION INTRA-ARTICULAR; INTRAMUSCULAR at 12:02

## 2024-01-31 RX ADMIN — LIDOCAINE HYDROCHLORIDE 4 ML: 10 INJECTION, SOLUTION INFILTRATION; PERINEURAL at 12:02

## 2024-01-31 NOTE — LETTER
1/31/2024         RE: Aubrey Zavala  [address]  [facility] [address]        Dear Colleague,    Thank you for referring your patient, Aubrey Zavala, to the [facility] [place]. Please see a copy of my visit note below.    ESTABLISHED PATIENT FOLLOW-UP:  Follow Up of the Left Knee       HISTORY OF PRESENT ILLNESS  Mr. Zavala is a pleasant 59 year old year old male who presents to clinic today for follow-up of left knee    Date of injury: August 2023  Date last seen: 12/20/23  Following Therapeutic Plan: Yes  Pain: Really painful and not improving  Function: Difficulty walking and weight bearing  Interval History: MRI of the left knee on 12/21/23     Pain persist daily, pain is localized to the anterior aspect of the knee.  Pain is worse with negotiating stairs, climbing up stairs 1 step at a time.  Pain with sitting to standing and squatting.    Denies any significant pain in the posterior medial aspect of the knee, or medial joint line.     Of note his wife recently broke her ankle and had to have surgical fixation in [place].  She is 4 weeks out from injury and Aubrey has to help her with transfers etc.    Additional medical/Social/Surgical histories reviewed in [facility] and updated as appropriate.    REVIEW OF SYSTEMS (1/31/2024)  CONSTITUTIONAL: Denies fever and weight loss  GASTROINTESTINAL: Denies abdominal pain, nausea, vomiting  MUSCULOSKELETAL: See HPI  SKIN: Denies any recent rash or lesion  NEUROLOGICAL: Denies numbness or focal weakness     PHYSICAL EXAM  There were no vitals taken for this visit.    General  - normal appearance, in no obvious distress  Musculoskeletal - Left knee  - stance: normal gait without limp  - inspection: no swelling or effusion  - palpation: no joint line tenderness, patellar tendon non-tender, tender patellar facets  - ROM: 135 degrees flexion, -5 degrees extension, not painful, crepitus with weight-bearing flexion  - strength: 5/5 in  flexion, 5/5 in extension  - special tests:  (-) Lachman  (-) anterior drawer  (-) Aurora  (-) Thessaly  (-) varus at 0 and 30 degrees flexion  (-) valgus at 0 and 30 degrees flexion  (+) patellar compression  (-) patellar apprehension  Neuro  - no sensory or motor deficit, grossly normal coordination, normal muscle tone    IMAGING : MR KNEE LEFT W/O CON    IMPRESSION:  1.  Complete radial tear of the medial meniscus posterior horn near the posterior root. Moderate degeneration of the posterior horn, including an additional shallow horizontal longitudinal tear.     2.  Moderate degenerative arthritic changes in the medial compartment, including smooth partial-thickness cartilage loss, subchondral sclerosis and minimal edema-like T2 signal intensity, and marginal osteophytes.     3.  No acute fracture or contusion.     4.  Smooth cartilage loss throughout the patellofemoral compartment, partial-thickness over the patella (grade II chondrosis), full-thickness over the femoral trochlea (grade IV).     5.  Low-grade degenerative fraying and shallow tearing of the free edge of the lateral meniscus, without a high-grade or clearly unstable tear. Mild chondral thinning and surface fraying in the lateral compartment (grade II).     6.  Cruciate and collateral ligaments are intact.     7.  Small knee joint effusion and small popliteal cyst.     ASSESSMENT & PLAN  Mr. Zavala is a 59 year old year old male who presents to clinic today with Follow Up of the Left Knee    Diagnosis:  Primary osteoarthritis of left knee  Degenerative meniscus tear of left knee    -Corticosteroid vs. Durolane injections were discussed today.  Given acute nature of his pain, we agreed to pursue initial corticosteroid.  -He will continue with his home exercise program implemented by Jennifer Farmer.  -Consideration for Durolane if pain is only modestly improved in the next 4 to 6 weeks.    PROCEDURE    Left Knee Injection - Intraarticular  The  patient was informed of the risks and the benefits of the procedure and a written consent was signed.  The patient s left knee was prepped with chlorhexidine in sterile fashion.   40 mg of triamcinolone suspension was drawn up into a 5 mL syringe with 4 mL of 1% lidocaine.  Injection was performed using substerile technique.  A 1.5-inch 22-gauge needle was used to enter the lateral aspect of the left knee.  Injection performed successfully without difficulty.  There were no complications. The patient tolerated the procedure well. There was negligible bleeding.   The patient was instructed to ice the knee upon leaving clinic and refrain from overuse over the next 3 days.   The patient was instructed to call or go to the emergency room with any unusual pain, swelling, redness, or if otherwise concerned.  A follow up appointment will be scheduled to evaluate response to the injection, and to assess range of motion and pain.   22 minutes on date of the encounter performing chart review, history and examination, independent imaging review, documentation, and additional activities noted above explaining options.  In addition to encounter time, separate time of 9 minutes utilized for procedure.  See procedure note for details.    It was a pleasure seeing Aubrey.    Solomon Watkins DO, [facility]  Primary Care Sports Medicine         Again, thank you for allowing me to participate in the care of your patient.        Sincerely,        oSlomon Watkins DO

## 2024-01-31 NOTE — PATIENT INSTRUCTIONS
Thank you for choosing [facility]    [facility] CLINIC LOCATIONS  [place] [address] [address] [facility], [address]th [place]   [place], [place], [address] [place] [address]   [phone]       APPOINTMENTS: [phone]    CARE QUESTIONS: [phone],    BILLING QUESTIONS: [phone]    FAX NUMBER: [phone]        Follow up: In one month if needed to consider gel injection      No diagnosis found.    Continue PT exercises      Steroid Injection Information:    A corticosteroid injection was administered at your visit today.  The area of injection may be sore, slightly swollen for 1-2 days afterward.  Immediately after injection, you may have an area of numbness, which is caused by the local anesthetic, lidocaine (similar to novacaine) in the shot.  This medicine will wear off in about 4 hours.  In addition to the lidocaine, a steroid medication was injected, called triamcinolone acetate.  This medication is intended to provide long-acting antiinflammatory / pain relief.  It may take 2-5 days for this medication to provide noticeable relief.      After an injection, Dr. Watkins recommends:    Protecting the area wearing a bandage or gauze pad for at least 24 hours.    Using ice; ice bag or frozen vegetables over the injection site every one to two hours when able (for 15 minutes at a time).      Avoid any strenuous activity even if the knee is already feeling better immediately afterward. Light stretching is encouraged but refrain from home exercise program and increasing activity level for about 48 hours.    Avoid soaking in a hot tub, bath, or pool for 48 hours after injection. Showering is fine!    Watch for signs of infection, including increasing pain, redness and swelling that last more than 48 hours after injection.

## 2024-01-31 NOTE — PROGRESS NOTES
ESTABLISHED PATIENT FOLLOW-UP:  Follow Up of the Left Knee       HISTORY OF PRESENT ILLNESS  Mr. Zavala is a pleasant 59 year old year old male who presents to clinic today for follow-up of left knee    Date of injury: August 2023  Date last seen: 12/20/23  Following Therapeutic Plan: Yes  Pain: Really painful and not improving  Function: Difficulty walking and weight bearing  Interval History: MRI of the left knee on 12/21/23     Pain persist daily, pain is localized to the anterior aspect of the knee.  Pain is worse with negotiating stairs, climbing up stairs 1 step at a time.  Pain with sitting to standing and squatting.    Denies any significant pain in the posterior medial aspect of the knee, or medial joint line.     Of note his wife recently broke her ankle and had to have surgical fixation in [place].  She is 4 weeks out from injury and Aubrey has to help her with transfers etc.    Additional medical/Social/Surgical histories reviewed in [facility] and updated as appropriate.    REVIEW OF SYSTEMS (1/31/2024)  CONSTITUTIONAL: Denies fever and weight loss  GASTROINTESTINAL: Denies abdominal pain, nausea, vomiting  MUSCULOSKELETAL: See HPI  SKIN: Denies any recent rash or lesion  NEUROLOGICAL: Denies numbness or focal weakness     PHYSICAL EXAM  There were no vitals taken for this visit.    General  - normal appearance, in no obvious distress  Musculoskeletal - Left knee  - stance: normal gait without limp  - inspection: no swelling or effusion  - palpation: no joint line tenderness, patellar tendon non-tender, tender patellar facets  - ROM: 135 degrees flexion, -5 degrees extension, not painful, crepitus with weight-bearing flexion  - strength: 5/5 in flexion, 5/5 in extension  - special tests:  (-) Lachman  (-) anterior drawer  (-) Aurora  (-) Thessaly  (-) varus at 0 and 30 degrees flexion  (-) valgus at 0 and 30 degrees flexion  (+) patellar compression  (-) patellar apprehension  Neuro  - no sensory or motor  deficit, grossly normal coordination, normal muscle tone    IMAGING : MR KNEE LEFT W/O CON    IMPRESSION:  1.  Complete radial tear of the medial meniscus posterior horn near the posterior root. Moderate degeneration of the posterior horn, including an additional shallow horizontal longitudinal tear.     2.  Moderate degenerative arthritic changes in the medial compartment, including smooth partial-thickness cartilage loss, subchondral sclerosis and minimal edema-like T2 signal intensity, and marginal osteophytes.     3.  No acute fracture or contusion.     4.  Smooth cartilage loss throughout the patellofemoral compartment, partial-thickness over the patella (grade II chondrosis), full-thickness over the femoral trochlea (grade IV).     5.  Low-grade degenerative fraying and shallow tearing of the free edge of the lateral meniscus, without a high-grade or clearly unstable tear. Mild chondral thinning and surface fraying in the lateral compartment (grade II).     6.  Cruciate and collateral ligaments are intact.     7.  Small knee joint effusion and small popliteal cyst.     ASSESSMENT & PLAN  Mr. Zavala is a 59 year old year old male who presents to clinic today with Follow Up of the Left Knee    Diagnosis:  Primary osteoarthritis of left knee  Degenerative meniscus tear of left knee    -Corticosteroid vs. Durolane injections were discussed today.  Given acute nature of his pain, we agreed to pursue initial corticosteroid.  -He will continue with his home exercise program implemented by Jennifer Farmer.  -Consideration for Durolane if pain is only modestly improved in the next 4 to 6 weeks.    PROCEDURE    Left Knee Injection - Intraarticular  The patient was informed of the risks and the benefits of the procedure and a written consent was signed.  The patient s left knee was prepped with chlorhexidine in sterile fashion.   40 mg of triamcinolone suspension was drawn up into a 5 mL syringe with 4 mL of 1%  lidocaine.  Injection was performed using substerile technique.  A 1.5-inch 22-gauge needle was used to enter the lateral aspect of the left knee.  Injection performed successfully without difficulty.  There were no complications. The patient tolerated the procedure well. There was negligible bleeding.   The patient was instructed to ice the knee upon leaving clinic and refrain from overuse over the next 3 days.   The patient was instructed to call or go to the emergency room with any unusual pain, swelling, redness, or if otherwise concerned.  A follow up appointment will be scheduled to evaluate response to the injection, and to assess range of motion and pain.   22 minutes on date of the encounter performing chart review, history and examination, independent imaging review, documentation, and additional activities noted above explaining options.  In addition to encounter time, separate time of 9 minutes utilized for procedure.  See procedure note for details.    It was a pleasure seeing Aubrey.    Solomon Watkins DO, [facility]  Primary Care Sports Medicine     Large Joint Injection/Arthocentesis: L knee joint    Date/Time: 1/31/2024 12:02 PM    Performed by: Solomon Watkins DO  Authorized by: Solomon Watkins DO    Indications:  Pain  Needle Size:  22 G  Guidance: landmark guided    Approach:  Anterolateral  Location:  Knee      Medications:  4 mL lidocaine 1 %; 40 mg triamcinolone 40 MG/ML  Outcome:  Tolerated well, no immediate complications  Procedure discussed: discussed risks, benefits, and alternatives    Consent Given by:  Patient  Timeout: timeout called immediately prior to procedure    Prep: patient was prepped and draped in usual sterile fashion

## 2024-02-14 ENCOUNTER — MYC REFILL (OUTPATIENT)
Dept: FAMILY MEDICINE | Facility: CLINIC | Age: 59
End: 2024-02-14
Payer: COMMERCIAL

## 2024-02-14 DIAGNOSIS — E53.8 VITAMIN B12 DEFICIENCY (NON ANEMIC): ICD-10-CM

## 2024-02-14 RX ORDER — SYRINGE W-NEEDLE,DISPOSAB,3 ML 25GX5/8"
1 SYRINGE, EMPTY DISPOSABLE MISCELLANEOUS
Qty: 50 EACH | Refills: 11 | Status: SHIPPED | OUTPATIENT
Start: 2024-02-14 | End: 2024-05-15

## 2024-03-23 ENCOUNTER — MYC REFILL (OUTPATIENT)
Dept: FAMILY MEDICINE | Facility: CLINIC | Age: 59
End: 2024-03-23
Payer: COMMERCIAL

## 2024-03-23 DIAGNOSIS — I10 HYPERTENSION, ESSENTIAL: ICD-10-CM

## 2024-03-23 DIAGNOSIS — E66.813 CLASS 3 SEVERE OBESITY DUE TO EXCESS CALORIES WITHOUT SERIOUS COMORBIDITY WITH BODY MASS INDEX (BMI) OF 40.0 TO 44.9 IN ADULT (H): ICD-10-CM

## 2024-03-23 DIAGNOSIS — E66.01 CLASS 3 SEVERE OBESITY DUE TO EXCESS CALORIES WITHOUT SERIOUS COMORBIDITY WITH BODY MASS INDEX (BMI) OF 40.0 TO 44.9 IN ADULT (H): ICD-10-CM

## 2024-03-25 PROCEDURE — 93248 EXT ECG>7D<15D REV&INTERPJ: CPT | Performed by: INTERNAL MEDICINE

## 2024-03-25 RX ORDER — AMLODIPINE BESYLATE 5 MG/1
5 TABLET ORAL DAILY
Qty: 90 TABLET | Refills: 3 | Status: SHIPPED | OUTPATIENT
Start: 2024-03-25

## 2024-03-30 DIAGNOSIS — E53.8 VITAMIN B12 DEFICIENCY (NON ANEMIC): ICD-10-CM

## 2024-04-01 RX ORDER — CYANOCOBALAMIN 1000 UG/ML
1 INJECTION, SOLUTION INTRAMUSCULAR; SUBCUTANEOUS
Qty: 6 ML | Refills: 1 | OUTPATIENT
Start: 2024-04-01

## 2024-04-23 DIAGNOSIS — E78.5 HYPERLIPIDEMIA LDL GOAL <100: ICD-10-CM

## 2024-04-23 RX ORDER — ATORVASTATIN CALCIUM 40 MG/1
40 TABLET, FILM COATED ORAL DAILY
Qty: 90 TABLET | Refills: 0 | Status: SHIPPED | OUTPATIENT
Start: 2024-04-23 | End: 2024-05-15

## 2024-05-15 ENCOUNTER — OFFICE VISIT (OUTPATIENT)
Dept: FAMILY MEDICINE | Facility: CLINIC | Age: 59
End: 2024-05-15
Attending: FAMILY MEDICINE
Payer: COMMERCIAL

## 2024-05-15 VITALS
OXYGEN SATURATION: 97 % | SYSTOLIC BLOOD PRESSURE: 136 MMHG | BODY MASS INDEX: 42.95 KG/M2 | TEMPERATURE: 97.7 F | WEIGHT: 300 LBS | DIASTOLIC BLOOD PRESSURE: 84 MMHG | HEART RATE: 81 BPM | HEIGHT: 70 IN | RESPIRATION RATE: 17 BRPM

## 2024-05-15 DIAGNOSIS — E53.8 VITAMIN B12 DEFICIENCY (NON ANEMIC): ICD-10-CM

## 2024-05-15 DIAGNOSIS — M53.3 SACROILIAC JOINT PAIN: ICD-10-CM

## 2024-05-15 DIAGNOSIS — Z00.00 ROUTINE GENERAL MEDICAL EXAMINATION AT A HEALTH CARE FACILITY: Primary | ICD-10-CM

## 2024-05-15 DIAGNOSIS — E78.5 HYPERLIPIDEMIA LDL GOAL <100: ICD-10-CM

## 2024-05-15 DIAGNOSIS — I10 HYPERTENSION, BENIGN ESSENTIAL, GOAL BELOW 140/90: ICD-10-CM

## 2024-05-15 DIAGNOSIS — M17.12 PRIMARY OSTEOARTHRITIS OF LEFT KNEE: ICD-10-CM

## 2024-05-15 DIAGNOSIS — R73.03 PREDIABETES: ICD-10-CM

## 2024-05-15 DIAGNOSIS — E66.813 CLASS 3 OBESITY: ICD-10-CM

## 2024-05-15 DIAGNOSIS — Z12.5 SCREENING FOR PROSTATE CANCER: ICD-10-CM

## 2024-05-15 PROBLEM — R79.89 ELEVATED SERUM CREATININE: Status: RESOLVED | Noted: 2023-04-26 | Resolved: 2024-05-15

## 2024-05-15 PROBLEM — M25.562 ACUTE PAIN OF LEFT KNEE: Status: RESOLVED | Noted: 2023-09-12 | Resolved: 2024-05-15

## 2024-05-15 LAB — HBA1C MFR BLD: 5.6 % (ref 0–5.6)

## 2024-05-15 PROCEDURE — 82607 VITAMIN B-12: CPT | Performed by: FAMILY MEDICINE

## 2024-05-15 PROCEDURE — 90472 IMMUNIZATION ADMIN EACH ADD: CPT | Performed by: FAMILY MEDICINE

## 2024-05-15 PROCEDURE — 90746 HEPB VACCINE 3 DOSE ADULT IM: CPT | Performed by: FAMILY MEDICINE

## 2024-05-15 PROCEDURE — 80048 BASIC METABOLIC PNL TOTAL CA: CPT | Performed by: FAMILY MEDICINE

## 2024-05-15 PROCEDURE — 90471 IMMUNIZATION ADMIN: CPT | Performed by: FAMILY MEDICINE

## 2024-05-15 PROCEDURE — 90715 TDAP VACCINE 7 YRS/> IM: CPT | Performed by: FAMILY MEDICINE

## 2024-05-15 PROCEDURE — 99396 PREV VISIT EST AGE 40-64: CPT | Mod: 25 | Performed by: FAMILY MEDICINE

## 2024-05-15 PROCEDURE — 99214 OFFICE O/P EST MOD 30 MIN: CPT | Mod: 25 | Performed by: FAMILY MEDICINE

## 2024-05-15 PROCEDURE — 80061 LIPID PANEL: CPT | Performed by: FAMILY MEDICINE

## 2024-05-15 PROCEDURE — G0103 PSA SCREENING: HCPCS | Performed by: FAMILY MEDICINE

## 2024-05-15 PROCEDURE — 83036 HEMOGLOBIN GLYCOSYLATED A1C: CPT | Performed by: FAMILY MEDICINE

## 2024-05-15 PROCEDURE — 36415 COLL VENOUS BLD VENIPUNCTURE: CPT | Performed by: FAMILY MEDICINE

## 2024-05-15 RX ORDER — CYANOCOBALAMIN 1000 UG/ML
1 INJECTION, SOLUTION INTRAMUSCULAR; SUBCUTANEOUS
Qty: 10 ML | Refills: 2 | Status: SHIPPED | OUTPATIENT
Start: 2024-05-15 | End: 2024-05-15

## 2024-05-15 RX ORDER — ATORVASTATIN CALCIUM 40 MG/1
40 TABLET, FILM COATED ORAL DAILY
Qty: 90 TABLET | Refills: 4 | Status: SHIPPED | OUTPATIENT
Start: 2024-05-15

## 2024-05-15 RX ORDER — HYDROCHLOROTHIAZIDE 25 MG/1
25 TABLET ORAL DAILY
Qty: 90 TABLET | Refills: 4 | Status: SHIPPED | OUTPATIENT
Start: 2024-05-15

## 2024-05-15 SDOH — HEALTH STABILITY: PHYSICAL HEALTH: ON AVERAGE, HOW MANY MINUTES DO YOU ENGAGE IN EXERCISE AT THIS LEVEL?: 0 MIN

## 2024-05-15 SDOH — HEALTH STABILITY: PHYSICAL HEALTH: ON AVERAGE, HOW MANY DAYS PER WEEK DO YOU ENGAGE IN MODERATE TO STRENUOUS EXERCISE (LIKE A BRISK WALK)?: 0 DAYS

## 2024-05-15 ASSESSMENT — SOCIAL DETERMINANTS OF HEALTH (SDOH): HOW OFTEN DO YOU GET TOGETHER WITH FRIENDS OR RELATIVES?: ONCE A WEEK

## 2024-05-15 ASSESSMENT — PAIN SCALES - GENERAL: PAINLEVEL: NO PAIN (0)

## 2024-05-15 NOTE — PATIENT INSTRUCTIONS
"Preventive Care Advice   This is general advice we often give to help people stay healthy. Your care team may have specific advice just for you. Please talk to your care team about your own preventive care needs.  Lifestyle  Exercise at least 150 minutes each week (30 minutes a day, 5 days a week).  Do muscle strengthening activities 2 days a week. These help control your weight and prevent disease.  No smoking.  Wear sunscreen to prevent skin cancer.  Have your home tested for radon every 2 to 5 years. Radon is a colorless, odorless gas that can harm your lungs. To learn more, go to www.health.[facility].mn. and search for \"Radon in Homes.\"  Keep guns unloaded and locked up in a safe place like a safe or gun vault, or, use a gun lock and hide the keys. Always lock away bullets separately. To learn more, visit [employer].[place].gov and search for \"safe gun storage.\"  Nutrition  Eat 5 or more servings of fruits and vegetables each day.  Try wheat bread, brown rice and whole grain pasta (instead of white bread, rice, and pasta).  Get enough calcium and vitamin D. Check the label on foods and aim for 100% of the RDA (recommended daily allowance).  Regular exams  Have a dental exam and cleaning every 6 months.  See your health care team every year to talk about:  Any changes in your health.  Any medicines your care team has prescribed.  Preventive care, family planning, and ways to prevent chronic diseases.  Shots (vaccines)   HPV shots (up to age 26), if you've never had them before.  Hepatitis B shots (up to age 59), if you've never had them before.  COVID-19 shot: Get this shot when it's due.  Flu shot: Get a flu shot every year.  Tetanus shot: Get a tetanus shot every 10 years.  Pneumococcal, hepatitis A, and RSV shots: Ask your care team if you need these based on your risk.  Shingles shot (for age 50 and up).  General health tests  Diabetes screening:  Starting at age 35, Get screened for diabetes at least every 3 years.  If " you are younger than age 35, ask your care team if you should be screened for diabetes.  Cholesterol test: At age 39, start having a cholesterol test every 5 years, or more often if advised.  Bone density scan (DEXA): At age 50, ask your care team if you should have this scan for osteoporosis (brittle bones).  Hepatitis C: Get tested at least once in your life.  Abdominal aortic aneurysm screening: Talk to your doctor about having this screening if you:  Have ever smoked; and  Are biologically male; and  Are between the ages of 65 and 75.  STIs (sexually transmitted infections)  Before age 24: Ask your care team if you should be screened for STIs.  After age 24: Get screened for STIs if you're at risk. You are at risk for STIs (including HIV) if:  You are sexually active with more than one person.  You don't use condoms every time.  You or a partner was diagnosed with a sexually transmitted infection.  If you are at risk for HIV, ask about PrEP medicine to prevent HIV.  Get tested for HIV at least once in your life, whether you are at risk for HIV or not.  Cancer screening tests  Cervical cancer screening: If you have a cervix, begin getting regular cervical cancer screening tests at age 21. Most people who have regular screenings with normal results can stop after age 65. Talk about this with your provider.  Breast cancer scan (mammogram): If you've ever had breasts, begin having regular mammograms starting at age 40. This is a scan to check for breast cancer.  Colon cancer screening: It is important to start screening for colon cancer at age 45.  Have a colonoscopy test every 10 years (or more often if you're at risk) Or, ask your provider about stool tests like a FIT test every year or Cologuard test every 3 years.  To learn more about your testing options, visit: www.[employer]/643699.pdf.  For help making a decision, visit: monique/zk10706.  Prostate cancer screening test: If you have a prostate and are age 55  to 69, ask your provider if you would benefit from a yearly prostate cancer screening test.  Lung cancer screening: If you are a current or former smoker age 50 to 80, ask your care team if ongoing lung cancer screenings are right for you.  For informational purposes only. Not to replace the advice of your health care provider. Copyright   2023 [employer]. All rights reserved. Clinically reviewed by the [facility] Transitions [facility]. [employer] [id number] - REV 04/24.    Learning About Stress  What is stress?     Stress is your body's response to a hard situation. Your body can have a physical, emotional, or mental response. Stress is a fact of life for most people, and it affects everyone differently. What causes stress for you may not be stressful for someone else.  A lot of things can cause stress. You may feel stress when you go on a job interview, take a test, or run a race. This kind of short-term stress is normal and even useful. It can help you if you need to work hard or react quickly. For example, stress can help you finish an important job on time.  Long-term stress is caused by ongoing stressful situations or events. Examples of long-term stress include long-term health problems, ongoing problems at work, or conflicts in your family. Long-term stress can harm your health.  How does stress affect your health?  When you are stressed, your body responds as though you are in danger. It makes hormones that speed up your heart, make you breathe faster, and give you a burst of energy. This is called the fight-or-flight stress response. If the stress is over quickly, your body goes back to normal and no harm is done.  But if stress happens too often or lasts too long, it can have bad effects. Long-term stress can make you more likely to get sick, and it can make symptoms of some diseases worse. If you tense up when you are stressed, you may develop neck, shoulder, or low back pain. Stress is  linked to high blood pressure and heart disease.  Stress also harms your emotional health. It can make you vasquez, tense, or depressed. Your relationships may suffer, and you may not do well at work or school.  What can you do to manage stress?  You can try these things to help manage stress:   Do something active. Exercise or activity can help reduce stress. Walking is a great way to get started. Even everyday activities such as housecleaning or yard work can help.  Try yoga or tara chi. These techniques combine exercise and meditation. You may need some training at first to learn them.  Do something you enjoy. For example, listen to music or go to a movie. Practice your hobby or do volunteer work.  Meditate. This can help you relax, because you are not worrying about what happened before or what may happen in the future.  Do guided imagery. Imagine yourself in any setting that helps you feel calm. You can use online videos, books, or a teacher to guide you.  Do breathing exercises. For example:  From a standing position, bend forward from the waist with your knees slightly bent. Let your arms dangle close to the floor.  Breathe in slowly and deeply as you return to a standing position. Roll up slowly and lift your head last.  Hold your breath for just a few seconds in the standing position.  Breathe out slowly and bend forward from the waist.  Let your feelings out. Talk, laugh, cry, and express anger when you need to. Talking with supportive friends or family, a counselor, or a caro leader about your feelings is a healthy way to relieve stress. Avoid discussing your feelings with people who make you feel worse.  Write. It may help to write about things that are bothering you. This helps you find out how much stress you feel and what is causing it. When you know this, you can find better ways to cope.  What can you do to prevent stress?  You might try some of these things to help prevent stress:  Manage your time.  "This helps you find time to do the things you want and need to do.  Get enough sleep. Your body recovers from the stresses of the day while you are sleeping.  Get support. Your family, friends, and community can make a difference in how you experience stress.  Limit your news feed. Avoid or limit time on social media or news that may make you feel stressed.  Do something active. Exercise or activity can help reduce stress. Walking is a great way to get started.  Where can you learn more?  Go to https://www.[employer].net/patiented  Enter N032 in the search box to learn more about \"Learning About Stress.\"  Current as of: October 24, 2023               Content Version: 14.0    4347-3217 [employer].   Care instructions adapted under license by your healthcare professional. If you have questions about a medical condition or this instruction, always ask your healthcare professional. [employer] disclaims any warranty or liability for your use of this information.      "

## 2024-05-15 NOTE — PROGRESS NOTES
Preventive Care Visit  [facility]  Anabel Abreu MD, Family Medicine  May 15, 2024      Assessment & Plan     Aubrey was seen today for physical.    Diagnoses and all orders for this visit:    Routine general medical examination at a health care facility    Screening for prostate cancer  -     PROSTATE SPEC ANTIGEN SCREEN; Future  -     PROSTATE SPEC ANTIGEN SCREEN    Class 3 obesity (H).Start trial of Wegovy Patient should qualify as BMI is 42, has related severe knee and back pain  Comments:  GLP:  We discussed how this medication works and risks and side effects  Plan visit with me in 3 months to assess and adjust dose further if needed  Orders:  -     Semaglutide-Weight Management (WEGOVY) 0.25 MG/0.5ML pen; Inject 0.25 mg Subcutaneous once a week  -     Semaglutide-Weight Management (WEGOVY) 0.5 MG/0.5ML pen; Inject 0.5 mg Subcutaneous once a week  -     Semaglutide-Weight Management (WEGOVY) 1 MG/0.5ML pen; Inject 1 mg Subcutaneous once a week    Hyperlipidemia LDL goal <100  Comments:  Recheck labs  Refilled atorvastatin 40 mg  Reordered CT calcium score which patient never had done although ordered last year  Orders:  -     Lipid panel reflex to direct LDL Non-fasting; Future  -     atorvastatin (LIPITOR) 40 MG tablet; Take 1 tablet (40 mg) by mouth daily  -     CT Coronary Calcium Scan; Future  -     Lipid panel reflex to direct LDL Non-fasting    Vitamin B12 deficiency (non anemic)  Comments:  Patient has been taking injections every 2 weeks  Recheck level.  Adjust frequency of dosing per result  Will send us picture of syringes that he needs refilled  Orders:  -     Vitamin B12; Future  -     Discontinue: cyanocobalamin (CYANOCOBALAMIN) 1000 MCG/ML injection; Inject 1 mL (1,000 mcg) into the muscle every 30 days  -     Hemoglobin A1c; Future  -     Vitamin B12  -     Hemoglobin A1c    Hypertension, benign essential, goal below 140/90  Comments:  Well controlled on hctz and  "amlodipine.  Refilled.  Check BMP today.  Orders:  -     Basic metabolic panel  (Ca, Cl, CO2, Creat, Gluc, K, Na, BUN); Future  -     hydrochlorothiazide (HYDRODIURIL) 25 MG tablet; Take 1 tablet (25 mg) by mouth daily  -     Basic metabolic panel  (Ca, Cl, CO2, Creat, Gluc, K, Na, BUN)    Hyperlipidemia LDL goal <100  Comments:  with elevated ASCVD score  start lipitor  recheck 3 months  Orders:  -     Lipid panel reflex to direct LDL Non-fasting; Future  -     atorvastatin (LIPITOR) 40 MG tablet; Take 1 tablet (40 mg) by mouth daily  -     CT Coronary Calcium Scan; Future  -     Lipid panel reflex to direct LDL Non-fasting    Prediabetes  Comments:  With numbness of toes.could be related to B12 deficiency or development of diabetes or back pain.  Check A1c today    Primary osteoarthritis of left knee  Comments:  2024: Follows with Dr. Watkins.  Injection in January helpful then faded.  Recommend follow-up with Ortho and weight loss    Sacroiliac joint pain  Comments:  2024: Continues with severe back pain.  Recommend weight loss, will start GLP-1 if covered by insurance    Other orders  -     PRIMARY CARE FOLLOW-UP SCHEDULING  -     HEPATITIS B, ADULT 20+ (ENGERIX-B/RECOMBIVAX HB)  -     TDAP 10-64Y (ADACEL,BOOSTRIX)  -     REVIEW OF HEALTH MAINTENANCE PROTOCOL ORDERS  -     PRIMARY CARE FOLLOW-UP SCHEDULING; Future        Note: Patient will send in paperwork if needs accommodation for work.  Currently he is having to lay down due to back pain for at least 30 minutes every day and so it would be difficult for him to go back to the office.  However I am hopeful that if we can get Wegovy covered and he can lose some weight his back pain will improve and he will not need this accommodation.            BMI  Estimated body mass index is 42.74 kg/m  as calculated from the following:    Height as of this encounter: 1.784 m (5' 10.25\").    Weight as of this encounter: 136.1 kg (300 lb).   Weight management plan: Discussed " healthy diet and exercise guidelines    Counseling  Appropriate preventive services were discussed with this patient, including applicable screening as appropriate for fall prevention, nutrition, physical activity, Tobacco-use cessation, weight loss and cognition.  Checklist reviewing preventive services available has been given to the patient.  Reviewed patient's diet, addressing concerns and/or questions.           Dick Burgos is a 59 year old, presenting for the following:  Physical        5/15/2024     3:24 PM   Additional Questions   Roomed by Magali BARBOZA        Health Care Directive  Patient does not have a Health Care Directive or Living Will: Discussed advance care planning with patient; however, patient declined at this time.    HPI    Pain left knee.  Going in for another shot.  Starting to wear off.  Knee doctor - Dr. Watkins following.  Has arthritis.  Injection helped.  Weight loss?  Toes numb a lot.  Taking B12 injections every 2 weeks.  Was out for a month.  Going back to the office.  Having to lay down for 30 minutes a day just to relieve pain.  Worried about back issue.      Back specialist.  Lower back now worse.                  5/15/2024   General Health   How would you rate your overall physical health? (!) POOR   Feel stress (tense, anxious, or unable to sleep) Rather much   (!) STRESS CONCERN      5/15/2024   Nutrition   Three or more servings of calcium each day? Yes   Diet: Regular (no restrictions)   How many servings of fruit and vegetables per day? (!) 2-3   How many sweetened beverages each day? 0-1         5/15/2024   Exercise   Days per week of moderate/strenous exercise 0 days   Average minutes spent exercising at this level 0 min   (!) EXERCISE CONCERN      5/15/2024   Social Factors   Frequency of gathering with friends or relatives Once a week   Worry food won't last until get money to buy more No   Food not last or not have enough money for food? No   Do you have housing?  Yes  "  Are you worried about losing your housing? No   Lack of transportation? No   Unable to get utilities (heat,electricity)? No         5/15/2024   Fall Risk   Fallen 2 or more times in the past year? No   Trouble with walking or balance? No          5/15/2024   Dental   Dentist two times every year? Yes         5/15/2024   TB Screening   Were you born outside of the [place]? No               5/15/2024   Substance Use   Alcohol more than 3/day or more than 7/wk No   Do you use any other substances recreationally? No     Social History     Tobacco Use    Smoking status: Never     Passive exposure: Never    Smokeless tobacco: Never   Vaping Use    Vaping status: Never Used   Substance Use Topics    Alcohol use: Yes     Comment: occ    Drug use: No           5/15/2024   STI Screening   New sexual partner(s) since last STI/HIV test? No   Last PSA:   Prostate Specific Antigen Screen   Date Value Ref Range Status   04/12/2023 0.34 0.00 - 3.50 ng/mL Final   01/13/2022 0.38 0.00 - 4.00 ug/L Final     ASCVD Risk   The 10-year ASCVD risk score (Marci MCGOWAN, et al., 2019) is: 10.1%    Values used to calculate the score:      Age: 59 years      Sex: Male      Is Non- : No      Diabetic: No      Tobacco smoker: No      Systolic Blood Pressure: 136 mmHg      Is BP treated: Yes      HDL Cholesterol: 46 mg/dL      Total Cholesterol: 200 mg/dL           Reviewed and updated as needed this visit by Provider                             Objective    Exam  /84 (BP Location: Right arm, Patient Position: Sitting, Cuff Size: Adult Large)   Pulse 81   Temp 97.7  F (36.5  C) (Temporal)   Resp 17   Ht 1.784 m (5' 10.25\")   Wt 136.1 kg (300 lb)   SpO2 97%   BMI 42.74 kg/m     Estimated body mass index is 42.74 kg/m  as calculated from the following:    Height as of this encounter: 1.784 m (5' 10.25\").    Weight as of this encounter: 136.1 kg (300 lb).    Physical Exam  GENERAL: alert and no distress  EYES: " Eyes grossly normal to inspection, PERRL and conjunctivae and sclerae normal  HENT: ear canals and TM's normal, nose and mouth without ulcers or lesions  NECK: no adenopathy, no asymmetry, masses, or scars  RESP: lungs clear to auscultation - no rales, rhonchi or wheezes  CV: regular rate and rhythm, normal S1 S2, no S3 or S4, no murmur, click or rub, no peripheral edema  ABDOMEN: soft, nontender, no hepatosplenomegaly, no masses and bowel sounds normal  MS: no gross musculoskeletal defects noted, no edema  SKIN: no suspicious lesions or rashes  NEURO: Normal strength and tone, mentation intact and speech normal  PSYCH: mentation appears normal, affect normal/bright        Signed Electronically by: Anabel Abreu MD

## 2024-05-15 NOTE — NURSING NOTE
Prior to immunization administration, verified patients identity using patient s name and date of birth. Please see Immunization Activity for additional information.     Screening Questionnaire for Adult Immunization    Are you sick today?   No   Do you have allergies to medications, food, a vaccine component or latex?   No   Have you ever had a serious reaction after receiving a vaccination?   No   Do you have a long-term health problem with heart, lung, kidney, or metabolic disease (e.g., diabetes), asthma, a blood disorder, no spleen, complement component deficiency, a cochlear implant, or a spinal fluid leak?  Are you on long-term aspirin therapy?   No   Do you have cancer, leukemia, HIV/AIDS, or any other immune system problem?   No   Do you have a parent, brother, or sister with an immune system problem?   No   In the past 3 months, have you taken medications that affect  your immune system, such as prednisone, other steroids, or anticancer drugs; drugs for the treatment of rheumatoid arthritis, Crohn s disease, or psoriasis; or have you had radiation treatments?   No   Have you had a seizure, or a brain or other nervous system problem?   No   During the past year, have you received a transfusion of blood or blood    products, or been given immune (gamma) globulin or antiviral drug?   No   For women: Are you pregnant or is there a chance you could become       pregnant during the next month?   No   Have you received any vaccinations in the past 4 weeks?   No     Immunization questionnaire answers were all negative.      Patient instructed to remain in clinic for 15 minutes afterwards, and to report any adverse reactions.     Screening performed by Magali Mckeon MA on 5/15/2024 at 4:25 PM.

## 2024-05-16 LAB
ANION GAP SERPL CALCULATED.3IONS-SCNC: 13 MMOL/L (ref 7–15)
BUN SERPL-MCNC: 13.8 MG/DL (ref 6–20)
CALCIUM SERPL-MCNC: 9.7 MG/DL (ref 8.6–10)
CHLORIDE SERPL-SCNC: 97 MMOL/L (ref 98–107)
CHOLEST SERPL-MCNC: 126 MG/DL
CREAT SERPL-MCNC: 0.99 MG/DL (ref 0.67–1.17)
DEPRECATED HCO3 PLAS-SCNC: 28 MMOL/L (ref 22–29)
EGFRCR SERPLBLD CKD-EPI 2021: 88 ML/MIN/1.73M2
FASTING STATUS PATIENT QL REPORTED: NO
FASTING STATUS PATIENT QL REPORTED: NO
GLUCOSE SERPL-MCNC: 105 MG/DL (ref 70–99)
HDLC SERPL-MCNC: 57 MG/DL
LDLC SERPL CALC-MCNC: 35 MG/DL
NONHDLC SERPL-MCNC: 69 MG/DL
POTASSIUM SERPL-SCNC: 3.7 MMOL/L (ref 3.4–5.3)
PSA SERPL DL<=0.01 NG/ML-MCNC: 0.31 NG/ML (ref 0–3.5)
SODIUM SERPL-SCNC: 138 MMOL/L (ref 135–145)
TRIGL SERPL-MCNC: 172 MG/DL
VIT B12 SERPL-MCNC: 2267 PG/ML (ref 232–1245)

## 2024-05-17 ENCOUNTER — TELEPHONE (OUTPATIENT)
Dept: FAMILY MEDICINE | Facility: CLINIC | Age: 59
End: 2024-05-17
Payer: COMMERCIAL

## 2024-05-17 DIAGNOSIS — E53.8 VITAMIN B12 DEFICIENCY (NON ANEMIC): Primary | ICD-10-CM

## 2024-05-17 RX ORDER — CYANOCOBALAMIN 1000 UG/ML
1 INJECTION, SOLUTION INTRAMUSCULAR; SUBCUTANEOUS
Qty: 10 ML | Refills: 2 | Status: SHIPPED | OUTPATIENT
Start: 2024-05-17

## 2024-05-17 NOTE — TELEPHONE ENCOUNTER
RNs: Can someone help me make sure this patient gets the right syringes?  I ordered it as a DME order because I cannot find the order in epic.  Perhaps he just needs to be called into the pharmacy?

## 2024-05-17 NOTE — TELEPHONE ENCOUNTER
Printed form from [employer] and placed in care team 3    Patient is karon wanting the BD Plastipak 3ml syrings. It is 23G x 1 1/2 TW IM (0.6mm x 40mm)

## 2024-05-20 NOTE — TELEPHONE ENCOUNTER
Called in the script on behalf of Dr. Abreu for:    BD plastipak 3 ml syringe 23G x1 1/2 TW IM ([id number])    To [facility] [employer] in [place].    Krysta Chamberlain, PEEWEEN RN  [facility]

## 2024-07-26 ENCOUNTER — MYC REFILL (OUTPATIENT)
Dept: FAMILY MEDICINE | Facility: CLINIC | Age: 59
End: 2024-07-26
Payer: COMMERCIAL

## 2024-07-26 DIAGNOSIS — E66.813 CLASS 3 OBESITY: ICD-10-CM

## 2024-07-30 ENCOUNTER — ANCILLARY PROCEDURE (OUTPATIENT)
Dept: GENERAL RADIOLOGY | Facility: CLINIC | Age: 59
End: 2024-07-30
Attending: FAMILY MEDICINE
Payer: COMMERCIAL

## 2024-07-30 ENCOUNTER — OFFICE VISIT (OUTPATIENT)
Dept: URGENT CARE | Facility: URGENT CARE | Age: 59
End: 2024-07-30
Payer: COMMERCIAL

## 2024-07-30 VITALS
RESPIRATION RATE: 16 BRPM | OXYGEN SATURATION: 96 % | SYSTOLIC BLOOD PRESSURE: 155 MMHG | DIASTOLIC BLOOD PRESSURE: 91 MMHG | TEMPERATURE: 97.7 F | HEART RATE: 90 BPM

## 2024-07-30 DIAGNOSIS — M25.512 ACUTE PAIN OF LEFT SHOULDER DUE TO TRAUMA: Primary | ICD-10-CM

## 2024-07-30 DIAGNOSIS — G89.11 ACUTE PAIN OF LEFT SHOULDER DUE TO TRAUMA: Primary | ICD-10-CM

## 2024-07-30 PROCEDURE — 73030 X-RAY EXAM OF SHOULDER: CPT | Mod: TC | Performed by: RADIOLOGY

## 2024-07-30 PROCEDURE — 99213 OFFICE O/P EST LOW 20 MIN: CPT | Performed by: FAMILY MEDICINE

## 2024-07-30 RX ORDER — IBUPROFEN 600 MG/1
600 TABLET, FILM COATED ORAL EVERY 6 HOURS PRN
Qty: 30 TABLET | Refills: 0 | Status: SHIPPED | OUTPATIENT
Start: 2024-07-30 | End: 2024-07-30

## 2024-07-30 ASSESSMENT — PAIN SCALES - GENERAL: PAINLEVEL: SEVERE PAIN (6)

## 2024-07-30 NOTE — PROGRESS NOTES
Chief Complaint   Patient presents with    Urgent Care    Shoulder Injury     Patient presents with his ;left shoulder in pain after an injury 1x month ago.       Aubrey was seen today for urgent care and shoulder injury.    Diagnoses and all orders for this visit:    Acute pain of left shoulder due to trauma  -     XR Shoulder Left G/E 3 Views  -     Discontinue: ibuprofen (ADVIL/MOTRIN) 600 MG tablet; Take 1 tablet (600 mg) by mouth every 6 hours as needed for moderate pain    D/d  Shoulder strain, sprain, contusion, fracture, dislocation, and subluxation    PLAN:  continue  shoulder  exercises, hand out given to patient   activity modification  Voltaren gel   Cannot do ibuprofen   Recommended doing tylenol for the pain   Follow up if  symptoms fail to improve or worsens   Pt understood and agreed with plan     See orders in [facility].      SUBJECTIVE:  Aubrey Zavala is a 59 year old male who sustained a left shoulder injury 1 months ago. Mechanism of injury: towel bar hit the posterior aspect of the left shoulder. Immediate symptoms: immediate pain, delayed pain, insidious onset of pain. Symptoms have been sudden, gradual since that time. Prior history of related problems: no prior problems with this area in the past.  Pain is worsening mainly when laying down   OBJECTIVE:  Vital signs as noted above.  Appearance: in no apparent distress.  Shoulder exam: soft tissue tenderness over the scapular border   X-ray: .  Ossicle noted at the distal end of the clavicle which could be from old fracture.    Jimena Mckee MD

## 2024-09-11 ENCOUNTER — OFFICE VISIT (OUTPATIENT)
Dept: FAMILY MEDICINE | Facility: CLINIC | Age: 59
End: 2024-09-11
Payer: COMMERCIAL

## 2024-09-11 VITALS
BODY MASS INDEX: 38.64 KG/M2 | HEIGHT: 70 IN | HEART RATE: 102 BPM | DIASTOLIC BLOOD PRESSURE: 74 MMHG | OXYGEN SATURATION: 97 % | RESPIRATION RATE: 21 BRPM | WEIGHT: 269.9 LBS | TEMPERATURE: 97.6 F | SYSTOLIC BLOOD PRESSURE: 122 MMHG

## 2024-09-11 DIAGNOSIS — E53.8 VITAMIN B12 DEFICIENCY (NON ANEMIC): ICD-10-CM

## 2024-09-11 DIAGNOSIS — E66.813 CLASS 3 OBESITY: Primary | ICD-10-CM

## 2024-09-11 LAB
ALBUMIN SERPL BCG-MCNC: 4.6 G/DL (ref 3.5–5.2)
ALP SERPL-CCNC: 122 U/L (ref 40–150)
ALT SERPL W P-5'-P-CCNC: 49 U/L (ref 0–70)
ANION GAP SERPL CALCULATED.3IONS-SCNC: 16 MMOL/L (ref 7–15)
AST SERPL W P-5'-P-CCNC: 48 U/L (ref 0–45)
BILIRUB SERPL-MCNC: 0.6 MG/DL
BUN SERPL-MCNC: 10.8 MG/DL (ref 6–20)
CALCIUM SERPL-MCNC: 9.7 MG/DL (ref 8.8–10.4)
CHLORIDE SERPL-SCNC: 97 MMOL/L (ref 98–107)
CREAT SERPL-MCNC: 0.94 MG/DL (ref 0.67–1.17)
CREAT UR-MCNC: 180 MG/DL
EGFRCR SERPLBLD CKD-EPI 2021: >90 ML/MIN/1.73M2
GLUCOSE SERPL-MCNC: 104 MG/DL (ref 70–99)
HCO3 SERPL-SCNC: 26 MMOL/L (ref 22–29)
MICROALBUMIN UR-MCNC: 21.3 MG/L
MICROALBUMIN/CREAT UR: 11.83 MG/G CR (ref 0–17)
POTASSIUM SERPL-SCNC: 3.4 MMOL/L (ref 3.4–5.3)
PROT SERPL-MCNC: 7.9 G/DL (ref 6.4–8.3)
SODIUM SERPL-SCNC: 139 MMOL/L (ref 135–145)
VIT B12 SERPL-MCNC: 624 PG/ML (ref 232–1245)

## 2024-09-11 PROCEDURE — G2211 COMPLEX E/M VISIT ADD ON: HCPCS | Performed by: FAMILY MEDICINE

## 2024-09-11 PROCEDURE — 82607 VITAMIN B-12: CPT | Performed by: FAMILY MEDICINE

## 2024-09-11 PROCEDURE — 82043 UR ALBUMIN QUANTITATIVE: CPT | Performed by: FAMILY MEDICINE

## 2024-09-11 PROCEDURE — 36415 COLL VENOUS BLD VENIPUNCTURE: CPT | Performed by: FAMILY MEDICINE

## 2024-09-11 PROCEDURE — 99214 OFFICE O/P EST MOD 30 MIN: CPT | Performed by: FAMILY MEDICINE

## 2024-09-11 PROCEDURE — 82570 ASSAY OF URINE CREATININE: CPT | Performed by: FAMILY MEDICINE

## 2024-09-11 PROCEDURE — 80053 COMPREHEN METABOLIC PANEL: CPT | Performed by: FAMILY MEDICINE

## 2024-09-11 ASSESSMENT — PAIN SCALES - GENERAL: PAINLEVEL: SEVERE PAIN (7)

## 2024-09-11 NOTE — PROGRESS NOTES
Assessment & Plan     Problem List as of 9/11/2024 Reviewed: 5/15/2024  6:02 PM by Anabel Abreu MD            Noted       Medium    1. Class 3 obesity (H) - Primary 10/15/2018     Overview Addendum 9/11/2024  3:23 PM by Anabel Abreu MD      09/11/2024 A/P:  30 lb weight loss on Wegovy.  Currently at 1 mg dose.  Will continue increase.  1.7 x 4 wks, then 2.4 mg ongoing as long as continues with no side effects.  Unfortunately back not feeling better yet but hopeful with continued weight loss it will.    2024: BMI 42.  Ordered Wegovy 5/2024, will do prior Auth.          Relevant Medications     Semaglutide-Weight Management (WEGOVY) 1.7 MG/0.75ML pen     Semaglutide-Weight Management (WEGOVY) 2.4 MG/0.75ML pen (Start on 10/9/2024)     Other Relevant Orders     Comprehensive metabolic panel (BMP + Alb, Alk Phos, ALT, AST, Total. Bili, TP)    2. Vitamin B12 deficiency (non anemic) 4/26/2023     Overview Addendum 9/11/2024  3:23 PM by Anabel Abreu MD      09/11/2024 A/P:  In May decreased B12 injections to monthly (but had just taken B12 shot day before)  Will recheck today, adjust if needed (due for next B12 shot tomorrow)    2024: recheck B12, adjust dose frequency if needed.  Has been taking every 2 weeks.  2023: Mom had as well. Eats full normal diet. Suspect pernicious anemia.  Start injections          Relevant Orders     Vitamin B12     Also - will watch for form from Aubrey for his work.  Plan discussed today: Work paperwork - need to work from home due to chronic severe back pain and needs to lay down for pain management sporadically throughout the day.          Subjective   Aubrey is a 59 year old, presenting for the following health issues:  Medication Follow-up (Wegovy)        9/11/2024     2:54 PM   Additional Questions   Roomed by Johana VALENZUELA MA   Accompanied by Self     History of Present Illness       Reason for visit:  Check up since starting Wachovy    He eats 0-1 servings of fruits  "and vegetables daily.He consumes 0 sweetened beverage(s) daily.He exercises with enough effort to increase his heart rate 9 or less minutes per day.  He exercises with enough effort to increase his heart rate 3 or less days per week.   He is taking medications regularly.     Wt Readings from Last 4 Encounters:   09/11/24 122.4 kg (269 lb 14.4 oz)   05/15/24 136.1 kg (300 lb)   01/22/24 129.4 kg (285 lb 3.2 oz)   10/10/23 128.4 kg (283 lb)       Started Wegovy this year, has lost ~30 lbs.  Feeling pretty good.  No side effects!  No nausea, no constipation, no diarrhea.  Feels maybe plauteauing.      Left knee still hurts  Back still hurts, has to lay down for awhile per day.      Work paperwork - need to work from home due to chronic severe back pain and needs to lay down for pain management sporadically throughout the day.    Painful to walk long distances for left knee arthritis.        Objective    /74 (BP Location: Right arm, Patient Position: Sitting, Cuff Size: Adult Large)   Pulse 102   Temp 97.6  F (36.4  C) (Temporal)   Resp 21   Ht 1.778 m (5' 10\")   Wt 122.4 kg (269 lb 14.4 oz)   SpO2 97%   BMI 38.73 kg/m    Body mass index is 38.73 kg/m .  Physical Exam   GENERAL: alert and no distress            Signed Electronically by: Anabel Abreu MD    "

## 2024-09-13 ENCOUNTER — MYC REFILL (OUTPATIENT)
Dept: FAMILY MEDICINE | Facility: CLINIC | Age: 59
End: 2024-09-13
Payer: COMMERCIAL

## 2024-09-13 DIAGNOSIS — E53.8 VITAMIN B12 DEFICIENCY (NON ANEMIC): ICD-10-CM

## 2024-09-13 RX ORDER — CYANOCOBALAMIN 1000 UG/ML
1 INJECTION, SOLUTION INTRAMUSCULAR; SUBCUTANEOUS
Qty: 10 ML | Refills: 2 | OUTPATIENT
Start: 2024-09-13

## 2024-10-08 ENCOUNTER — MYC MEDICAL ADVICE (OUTPATIENT)
Dept: FAMILY MEDICINE | Facility: CLINIC | Age: 59
End: 2024-10-08
Payer: COMMERCIAL

## 2024-10-08 ENCOUNTER — TELEPHONE (OUTPATIENT)
Dept: FAMILY MEDICINE | Facility: CLINIC | Age: 59
End: 2024-10-08
Payer: COMMERCIAL

## 2024-10-08 NOTE — TELEPHONE ENCOUNTER
Forms/Letter Request    Type of form/letter: OTHER: Mobility for work pt wants to speak to Anabel or someone on the care team to let them know what they are looking for regarding the form       Do we have the form/letter: Not yet the clinic will receive the form this week    Who is the form from? [facility]     Where did/will the form come from? Form will be faxed in    When is form/letter needed by: By next week    How would you like the form/letter returned:  Whatever the instructions are     Patient Notified form requests are processed in 5-7 business days:Yes    Could we send this information to you in [employer] or would you prefer to receive a phone call?:   Patient would prefer a phone call   Okay to leave a detailed message?: Yes at Cell number on file:    Telephone Information:   Mobile [phone]

## 2024-10-08 NOTE — TELEPHONE ENCOUNTER
Work forms requested to be completed (not yet received on site) and patient noted below he is requesting this by next week.    Please advise on next steps-should patient attempt to schedule elsewhere to see if access? Complete eVisit? Thanks!

## 2024-10-08 NOTE — TELEPHONE ENCOUNTER
Connected with patient  Advised on eVisit and that forms have not been obtained at this time  Patient shares his employment advised him he will be receiving forms in next few days, and that he will complete CLAUDIA  Sent MyC w/ light instructions and advised follow up MyC will be sent once forms received so eVisit can be completed at that time-

## 2024-10-10 NOTE — TELEPHONE ENCOUNTER
Forms/Letter Request    Type of form/letter: OTHER: Medical Accommodation       Do we have the form/letter: Yes: Placed in care team 3    Who is the form from? Employer    Where did/will the form come from? form was faxed in    When is form/letter needed by: as soon as able    How would you like the form/letter returned: Fax : [phone]

## 2024-10-11 NOTE — TELEPHONE ENCOUNTER
Note - I have forms but haven't seen Eliot.  He will need to start this for me to complete.  Will place back in my folder - I'm out of office W-F next week.

## 2024-10-23 ENCOUNTER — E-VISIT (OUTPATIENT)
Dept: FAMILY MEDICINE | Facility: CLINIC | Age: 59
End: 2024-10-23
Payer: COMMERCIAL

## 2024-10-23 DIAGNOSIS — M53.3 SACROILIAC JOINT PAIN: Primary | ICD-10-CM

## 2024-10-23 PROCEDURE — 99421 OL DIG E/M SVC 5-10 MIN: CPT | Performed by: FAMILY MEDICINE

## 2024-11-07 DIAGNOSIS — E53.8 VITAMIN B12 DEFICIENCY (NON ANEMIC): ICD-10-CM

## 2024-11-08 RX ORDER — CYANOCOBALAMIN 1000 UG/ML
1 INJECTION, SOLUTION INTRAMUSCULAR; SUBCUTANEOUS
Qty: 3 ML | Refills: 1 | Status: SHIPPED | OUTPATIENT
Start: 2024-11-08

## 2024-11-08 RX ORDER — CYANOCOBALAMIN 1000 UG/ML
INJECTION, SOLUTION INTRAMUSCULAR; SUBCUTANEOUS
Refills: 0 | OUTPATIENT
Start: 2024-11-08

## 2024-11-14 DIAGNOSIS — E66.813 CLASS 3 SEVERE OBESITY DUE TO EXCESS CALORIES WITHOUT SERIOUS COMORBIDITY WITH BODY MASS INDEX (BMI) OF 40.0 TO 44.9 IN ADULT (H): ICD-10-CM

## 2024-11-14 DIAGNOSIS — I10 HYPERTENSION, ESSENTIAL: ICD-10-CM

## 2024-11-14 DIAGNOSIS — E66.01 CLASS 3 SEVERE OBESITY DUE TO EXCESS CALORIES WITHOUT SERIOUS COMORBIDITY WITH BODY MASS INDEX (BMI) OF 40.0 TO 44.9 IN ADULT (H): ICD-10-CM

## 2024-11-15 RX ORDER — AMLODIPINE BESYLATE 5 MG/1
5 TABLET ORAL DAILY
Qty: 90 TABLET | Refills: 3 | Status: SHIPPED | OUTPATIENT
Start: 2024-11-15

## 2024-11-23 ENCOUNTER — MYC REFILL (OUTPATIENT)
Dept: FAMILY MEDICINE | Facility: CLINIC | Age: 59
End: 2024-11-23
Payer: COMMERCIAL

## 2024-11-23 DIAGNOSIS — E66.813 CLASS 3 OBESITY: ICD-10-CM

## 2024-11-26 ENCOUNTER — OFFICE VISIT (OUTPATIENT)
Dept: URGENT CARE | Facility: URGENT CARE | Age: 59
End: 2024-11-26
Payer: COMMERCIAL

## 2024-11-26 VITALS
BODY MASS INDEX: 36.51 KG/M2 | SYSTOLIC BLOOD PRESSURE: 148 MMHG | HEART RATE: 87 BPM | DIASTOLIC BLOOD PRESSURE: 98 MMHG | HEIGHT: 70 IN | WEIGHT: 255 LBS | OXYGEN SATURATION: 97 % | TEMPERATURE: 97.8 F

## 2024-11-26 DIAGNOSIS — M25.512 ACUTE PAIN OF LEFT SHOULDER: Primary | ICD-10-CM

## 2024-11-26 PROCEDURE — 99214 OFFICE O/P EST MOD 30 MIN: CPT | Performed by: NURSE PRACTITIONER

## 2024-11-26 RX ORDER — OXYCODONE HYDROCHLORIDE 5 MG/1
5 TABLET ORAL EVERY 6 HOURS PRN
Qty: 10 TABLET | Refills: 0 | Status: SHIPPED | OUTPATIENT
Start: 2024-11-26

## 2024-11-26 RX ORDER — TRAMADOL HYDROCHLORIDE 50 MG/1
50 TABLET ORAL EVERY 6 HOURS
COMMUNITY
Start: 2024-09-20

## 2024-11-26 NOTE — PROGRESS NOTES
Chief Complaint   Patient presents with    Musculoskeletal Problem    Urgent Care     Pt in clinic to have eval for worsening left shoulder pain     SUBJECTIVE:  Aubrey Zavala is a 59 year old male presenting with    Past Medical History:   Diagnosis Date    Acute pain of left knee 09/12/2023    CARDIOVASCULAR SCREENING; LDL GOAL LESS THAN 130     Elevated serum creatinine 04/26/2023    Was taking aleve once a day  Now has stopped that  okay to use tylenol as needed for pain  Discussed increase water - alternate water and mt dew  Recheck today      Hypertension, benign essential, goal below 140/90 09/16/2016    Strain of lumbar region 06/22/2020    Unspecified essential hypertension      Current Outpatient Medications   Medication Sig Dispense Refill    Alcohol Swabs PADS 1 each every 14 days 100 each 0    amLODIPine (NORVASC) 5 MG tablet Take 1 tablet (5 mg) by mouth daily. 90 tablet 3    atorvastatin (LIPITOR) 40 MG tablet Take 1 tablet (40 mg) by mouth daily 90 tablet 4    cyanocobalamin (CYANOCOBALAMIN) 1000 mcg/mL injection Inject 1 mL (1,000 mcg) into the muscle every 30 days. 3 mL 1    hydrochlorothiazide (HYDRODIURIL) 25 MG tablet Take 1 tablet (25 mg) by mouth daily 90 tablet 4    Semaglutide-Weight Management (WEGOVY) 1 MG/0.5ML pen Inject 1 mg Subcutaneous once a week 2 mL 3    Semaglutide-Weight Management (WEGOVY) 1.7 MG/0.75ML pen Inject 1.7 mg subcutaneously once a week. 3 mL 0    Semaglutide-Weight Management (WEGOVY) 2.4 MG/0.75ML pen Inject 2.4 mg subcutaneously once a week. 3 mL 3    traMADol (ULTRAM) 50 MG tablet Take 50 mg by mouth every 6 hours.       No current facility-administered medications for this visit.     Social History     Tobacco Use    Smoking status: Never     Passive exposure: Never    Smokeless tobacco: Never   Substance Use Topics    Alcohol use: Yes     Comment: occ     Allergies   Allergen Reactions    No Known Drug Allergy        Review of Systems    OBJECTIVE:   BP (!)  "148/98   Pulse 87   Temp 97.8  F (36.6  C) (Tympanic)   Ht 1.778 m (5' 10\")   Wt 115.7 kg (255 lb)   SpO2 97%   BMI 36.59 kg/m      Physical Exam    ASSESSMENT:  No diagnosis found.    PLAN:     Acute on chronic left shoulder pain  Rotate Tylenol ibuprofen every 4-6 hours  Okay for short course of oxycodone for severe breakthrough pain, use caution with sedation constipation  Urgent Ortho referral placed to discuss MRI  Urgent care limited with chest x-ray  May need injection physical therapy  Differentials include AC joint arthritis contusion sprain ligament tear rotator cuff tendinitis  Follow-up with primary care as needed    Follow up with primary care provider with any problems, questions or concerns or if symptoms worsen or fail to improve. Patient agreed to plan and verbalized understanding.    Graciela Roldan, JOVANNI-[facility] [place]  "   Skin:     General: Skin is warm and dry.      Findings: No rash.   Neurological:      General: No focal deficit present.      Mental Status: He is alert and oriented to person, place, and time.   Psychiatric:         Mood and Affect: Mood normal.         Behavior: Behavior normal.       ASSESSMENT:    ICD-10-CM    1. Acute pain of left shoulder  M25.512 oxyCODONE (ROXICODONE) 5 MG tablet     Orthopedic  Referral        PLAN:     Acute on chronic left shoulder pain  Rotate Tylenol ibuprofen every 4-6 hours  Okay for short course of oxycodone for severe breakthrough pain, use caution with sedation constipation  Urgent Ortho referral placed to discuss MRI  Urgent care limited with just x-ray  May need injection physical therapy  Differentials include AC joint arthritis contusion sprain ligament tear rotator cuff tendinitis  Follow-up with primary care as needed    Follow up with primary care provider with any problems, questions or concerns or if symptoms worsen or fail to improve. Patient agreed to plan and verbalized understanding.    Graciela Roldan, JOVANNI-BC  [facility] [place]

## 2024-11-26 NOTE — PATIENT INSTRUCTIONS
Acute on chronic left shoulder pain  Rotate Tylenol ibuprofen every 4-6 hours  Okay for short course of oxycodone for severe breakthrough pain, use caution with sedation constipation  Urgent Ortho referral placed to discuss MRI  Urgent care limited with chest x-ray  May need injection physical therapy  Differentials include AC joint arthritis contusion sprain ligament tear rotator cuff tendinitis  Follow-up with primary care as needed

## 2024-11-27 ENCOUNTER — TELEPHONE (OUTPATIENT)
Dept: ORTHOPEDICS | Facility: CLINIC | Age: 59
End: 2024-11-27

## 2024-11-27 ENCOUNTER — OFFICE VISIT (OUTPATIENT)
Dept: ORTHOPEDICS | Facility: CLINIC | Age: 59
End: 2024-11-27
Attending: NURSE PRACTITIONER
Payer: COMMERCIAL

## 2024-11-27 VITALS
DIASTOLIC BLOOD PRESSURE: 95 MMHG | WEIGHT: 255 LBS | SYSTOLIC BLOOD PRESSURE: 160 MMHG | HEIGHT: 70 IN | BODY MASS INDEX: 36.51 KG/M2

## 2024-11-27 DIAGNOSIS — M25.612 STIFF SHOULDER, LEFT: ICD-10-CM

## 2024-11-27 DIAGNOSIS — M25.312 ROTATOR CUFF INSUFFICIENCY OF LEFT SHOULDER: Primary | ICD-10-CM

## 2024-11-27 DIAGNOSIS — M25.512 ACUTE PAIN OF LEFT SHOULDER: ICD-10-CM

## 2024-11-27 DIAGNOSIS — R29.898 WEAKNESS OF LEFT SHOULDER: ICD-10-CM

## 2024-11-27 PROCEDURE — 99214 OFFICE O/P EST MOD 30 MIN: CPT | Performed by: FAMILY MEDICINE

## 2024-11-27 NOTE — PROGRESS NOTES
Aubrey Zavala  :  1965  DOS: 2024  MRN: [id number]    Sports Medicine Clinic Visit    PCP: Anabel Abreu    Aubrey Zavala is a 59 year old Right hand dominant male who is seen in consultation at the request of Graciela Pond N.P. presenting with left shoulder pain.    Injury: Patient describes injury as climbing ladder @ his cabin, hit anterior superior aspect of shoulder on hard peg that occurred ~ 4 months ago (2024).  Pain located over left superior posterior lateral shoulder, radiating to lateral upper arm.  Additional Features:  Positive: weakness and limited AROM.  Symptoms are better with Other medications: oxycodone and Rest.  Symptoms are worse with: shoulder flexion/abduction, IR behind back, lying on left shoulder.  Other evaluation and/or treatments so far consists of: Ice, Heat, Tylenol, Other medications: Oxycodone, Rest, and multiple UC visits.  Recent imaging completed: X-rays completed 24.  Prior History of related problems: none     Social History: currently employed as desk/computer position    Review of Systems  Musculoskeletal: as above  Remainder of review of systems is negative including constitutional, CV, pulmonary, GI, Skin and Neurologic except as noted in HPI or medical history.    Past Medical History:   Diagnosis Date    Acute pain of left knee 2023    CARDIOVASCULAR SCREENING; LDL GOAL LESS THAN 130     Elevated serum creatinine 2023    Was taking aleve once a day  Now has stopped that  okay to use tylenol as needed for pain  Discussed increase water - alternate water and mt dew  Recheck today      Hypertension, benign essential, goal below 140/90 2016    Strain of lumbar region 2020    Unspecified essential hypertension      Past Surgical History:   Procedure Laterality Date    CLOSED RX METATARSAL FX      HC TOOTH EXTRACTION W/FORCEP      INJECT JOINT SACROILIAC Bilateral 2021    Procedure: INJECTION, SACROILIAC JOINT;   "Surgeon: Irvin Kelly MD;  Location:  OR     Family History   Problem Relation Age of Onset    Hypertension Mother     Coronary Artery Disease Father     Cancer Brother         MENs disease    Hypertension Brother     Cancer Sister         MENs disease       Objective  BP (!) 160/95   Ht 1.778 m (5' 10\")   Wt 115.7 kg (255 lb)   BMI 36.59 kg/m      General: healthy, alert and in no distress    HEENT: no scleral icterus or conjunctival erythema   Skin: no suspicious lesions or rash. No jaundice.   CV: regular rhythm by palpation, 2+ distal pulses, no pedal edema    Resp: normal respiratory effort without conversational dyspnea   Psych: normal mood and affect    Gait: nonantalgic, appropriate coordination and balance   Neuro: normal light touch sensory exam of the extremities. Motor strength as noted below     Left Shoulder exam    ROM:        forward flexion 80        abduction 80       internal rotation limited       external rotation 15       asymmetric scapular motion due to pain and stiffness       Reports stiffness and pain with active and passive ROM    Tender:        subacromial space most focal       posterior shoulder       Anterior shoulder    Non Tender:       remainder of shoulder    Strength:        abduction 2/5       internal rotation 3/5       external rotation 3/5       adduction 3/5    Impingement testing:        Difficulty with full testing due to stiffness and pain, although there was not clear mechanical block with passive ROM    Stability testing:       neg (-) anterior glide       neg (-) sulcus sign    Skin:       no visible deformities       well perfused       capillary refill brisk    Sensation:        normal sensation over shoulder and upper extremity       Radiology  Results for orders placed or performed in visit on 07/30/24   XR Shoulder Left G/E 3 Views    Narrative    XR SHOULDER LEFT G/E 3 VIEWS  7/30/2024 2:50 PM     HISTORY: Acute pain of left shoulder due to " trauma  COMPARISON: None      Impression    IMPRESSION: Chronic ossicle at the distal clavicle, which may be due  to a prior nondisplaced fracture. Moderate acromioclavicular joint  degenerative changes. There is normal glenohumeral joint spacing and  alignment.     JOSSY CARREON MD         SYSTEM ID:  [id number]       Assessment:  1. Rotator cuff insufficiency of left shoulder    2. Acute pain of left shoulder    3. Weakness of left shoulder    4. Stiff shoulder, left        Plan:  Discussed the assessment with the patient.  Follow up: will contact with MRI results  Acute injury 4 months ago, not improved/worsening  Very limited shoulder function overall, stiff and painful  May be a component of adhesive capsulitis developing, difficult to say for certain, although there does not feel like a hard endpoint to his passive ROM, testing limited by pain  Informal bedside US very concerning for significant RTC pathology, full-thickness tearing  Significant fluid around damaged tendon, consistent with injury and subacromial bursitis  MRI ordered for better diagnostic clarity, given 4 mo from injury there is concern for narrow timing of repair options if complete tear is present.  Infraspinatus does appear intact but also with tendinopathy  Basic HEP provided, consider PT based on progress  Injection options can be discussed in the future as well, but given concern for possible surgical consideration will defer that for now  Oral Tylenol and topical Voltaren gel reviewed as safe OTC options, reviewed safe dosing strategies  XR images independently visualized and reviewed with patient today in clinic  We discussed modified progressive pain-free activity as tolerated  Home handouts provided and supportive care reviewed  All questions were answered today  Contact us with additional questions or concerns  Signs and sx of concern reviewed      Isidoro Ospina DO, [place]  Sports Medicine Physician  [facility]      Disclaimer: This note consists of symbols derived from keyboarding, dictation and/or voice recognition software. As a result, there may be errors in the script that have gone undetected. Please consider this when interpreting information found in this chart.

## 2024-11-27 NOTE — LETTER
2024      Aubrey Zavala  [address]  [place] [id number]      Dear Colleague,    Thank you for referring your patient, Aubrey Zavala, to the [facility]. Please see a copy of my visit note below.    Aubrey Zavala  :  1965  DOS: 2024  MRN: [id number]    Sports Medicine Clinic Visit    PCP: Anabel Abreu    Aubrey Zavala is a 59 year old Right hand dominant male who is seen in consultation at the request of Graciela Pond N.P. presenting with left shoulder pain.    Injury: Patient describes injury as climbing ladder @ his cabin, hit anterior superior aspect of shoulder on hard peg that occurred ~ 4 months ago (2024).  Pain located over left superior posterior lateral shoulder, radiating to lateral upper arm.  Additional Features:  Positive: weakness and limited AROM.  Symptoms are better with Other medications: oxycodone and Rest.  Symptoms are worse with: shoulder flexion/abduction, IR behind back, lying on left shoulder.  Other evaluation and/or treatments so far consists of: Ice, Heat, Tylenol, Other medications: Oxycodone, Rest, and multiple UC visits.  Recent imaging completed: X-rays completed 24.  Prior History of related problems: none     Social History: currently employed as desk/computer position    Review of Systems  Musculoskeletal: as above  Remainder of review of systems is negative including constitutional, CV, pulmonary, GI, Skin and Neurologic except as noted in HPI or medical history.    Past Medical History:   Diagnosis Date     Acute pain of left knee 2023     CARDIOVASCULAR SCREENING; LDL GOAL LESS THAN 130      Elevated serum creatinine 2023    Was taking aleve once a day  Now has stopped that  okay to use tylenol as needed for pain  Discussed increase water - alternate water and mt dew  Recheck today       Hypertension, benign essential, goal below 140/90 2016     Strain of lumbar region  "06/22/2020     Unspecified essential hypertension      Past Surgical History:   Procedure Laterality Date     CLOSED RX METATARSAL FX  1978     HC TOOTH EXTRACTION W/FORCEP       INJECT JOINT SACROILIAC Bilateral 7/30/2021    Procedure: INJECTION, SACROILIAC JOINT;  Surgeon: Irvin Kelly MD;  Location:  OR     Family History   Problem Relation Age of Onset     Hypertension Mother      Coronary Artery Disease Father      Cancer Brother         MENs disease     Hypertension Brother      Cancer Sister         MENs disease       Objective  BP (!) 160/95   Ht 1.778 m (5' 10\")   Wt 115.7 kg (255 lb)   BMI 36.59 kg/m      General: healthy, alert and in no distress    HEENT: no scleral icterus or conjunctival erythema   Skin: no suspicious lesions or rash. No jaundice.   CV: regular rhythm by palpation, 2+ distal pulses, no pedal edema    Resp: normal respiratory effort without conversational dyspnea   Psych: normal mood and affect    Gait: nonantalgic, appropriate coordination and balance   Neuro: normal light touch sensory exam of the extremities. Motor strength as noted below     Left Shoulder exam    ROM:        forward flexion 80        abduction 80       internal rotation limited       external rotation 15       asymmetric scapular motion due to pain and stiffness       Reports stiffness and pain with active and passive ROM    Tender:        subacromial space most focal       posterior shoulder       Anterior shoulder    Non Tender:       remainder of shoulder    Strength:        abduction 2/5       internal rotation 3/5       external rotation 3/5       adduction 3/5    Impingement testing:        Difficulty with full testing due to stiffness and pain, although there was not clear mechanical block with passive ROM    Stability testing:       neg (-) anterior glide       neg (-) sulcus sign    Skin:       no visible deformities       well perfused       capillary refill brisk    Sensation:        normal " sensation over shoulder and upper extremity       Radiology  Results for orders placed or performed in visit on 07/30/24   XR Shoulder Left G/E 3 Views    Narrative    XR SHOULDER LEFT G/E 3 VIEWS  7/30/2024 2:50 PM     HISTORY: Acute pain of left shoulder due to trauma  COMPARISON: None      Impression    IMPRESSION: Chronic ossicle at the distal clavicle, which may be due  to a prior nondisplaced fracture. Moderate acromioclavicular joint  degenerative changes. There is normal glenohumeral joint spacing and  alignment.     JOSSY CARREON MD         SYSTEM ID:  [id number]       Assessment:  1. Rotator cuff insufficiency of left shoulder    2. Acute pain of left shoulder    3. Weakness of left shoulder    4. Stiff shoulder, left        Plan:  Discussed the assessment with the patient.  Follow up: will contact with MRI results  Acute injury 4 months ago, not improved/worsening  Very limited shoulder function overall, stiff and painful  May be a component of adhesive capsulitis developing, difficult to say for certain, although there does not feel like a hard endpoint to his passive ROM, testing limited by pain  Informal bedside US very concerning for significant RTC pathology, full-thickness tearing  Significant fluid around damaged tendon, consistent with injury and subacromial bursitis  MRI ordered for better diagnostic clarity, given 4 mo from injury there is concern for narrow timing of repair options if complete tear is present.  Infraspinatus does appear intact but also with tendinopathy  Basic HEP provided, consider PT based on progress  Injection options can be discussed in the future as well, but given concern for possible surgical consideration will defer that for now  Oral Tylenol and topical Voltaren gel reviewed as safe OTC options, reviewed safe dosing strategies  XR images independently visualized and reviewed with patient today in clinic  We discussed modified progressive pain-free activity as  tolerated  Home handouts provided and supportive care reviewed  All questions were answered today  Contact us with additional questions or concerns  Signs and sx of concern reviewed      Isidoro Ospina DO, [place]  Sports Medicine Physician  [facility] [place] [facility]      Disclaimer: This note consists of symbols derived from keyboarding, dictation and/or voice recognition software. As a result, there may be errors in the script that have gone undetected. Please consider this when interpreting information found in this chart.      Again, thank you for allowing me to participate in the care of your patient.        Sincerely,        Isidoro Ospina DO

## 2024-11-29 ENCOUNTER — HOSPITAL ENCOUNTER (OUTPATIENT)
Dept: MRI IMAGING | Facility: CLINIC | Age: 59
Discharge: HOME | End: 2024-11-29
Attending: FAMILY MEDICINE | Admitting: FAMILY MEDICINE
Payer: COMMERCIAL

## 2024-11-29 DIAGNOSIS — M25.612 STIFF SHOULDER, LEFT: ICD-10-CM

## 2024-11-29 DIAGNOSIS — M25.312 ROTATOR CUFF INSUFFICIENCY OF LEFT SHOULDER: ICD-10-CM

## 2024-11-29 DIAGNOSIS — M25.512 ACUTE PAIN OF LEFT SHOULDER: ICD-10-CM

## 2024-11-29 DIAGNOSIS — R29.898 WEAKNESS OF LEFT SHOULDER: ICD-10-CM

## 2024-11-29 PROCEDURE — 73221 MRI JOINT UPR EXTREM W/O DYE: CPT | Mod: 26 | Performed by: RADIOLOGY

## 2024-11-29 PROCEDURE — 73221 MRI JOINT UPR EXTREM W/O DYE: CPT | Mod: LT

## 2024-12-02 ENCOUNTER — TELEPHONE (OUTPATIENT)
Dept: ORTHOPEDICS | Facility: CLINIC | Age: 59
End: 2024-12-02
Payer: COMMERCIAL

## 2024-12-02 DIAGNOSIS — S46.012D TRAUMATIC COMPLETE TEAR OF LEFT ROTATOR CUFF, SUBSEQUENT ENCOUNTER: ICD-10-CM

## 2024-12-02 DIAGNOSIS — M25.512 ACUTE PAIN OF LEFT SHOULDER: Primary | ICD-10-CM

## 2024-12-02 NOTE — TELEPHONE ENCOUNTER
Spoke to patient discussed MRI results & recommendation.  Ortho  referral was placed for surgical consult & scheduling information.  Patient expressed understanding and had no further questions.    Driss White ATC

## 2024-12-02 NOTE — TELEPHONE ENCOUNTER
Please reach out to Aubrey with MRI results.  Acute, large RTC tear as expected.  No other significant acute pathology, scattered wear and tear changes otherwise.  Ok to continue gentle assisted ROM as able, pendulum exercises as tolerated to avoid stiffness.  Avoid lift/carry with that arm for now.      Would recommend surgical discussion next, please offer referral to orthopedic surgery, thanks.  Happy to answer add'l questions if needed.    Isidoro Ospina DO, CAQ  Sports Medicine Physician  [facility]

## 2024-12-03 ENCOUNTER — PATIENT OUTREACH (OUTPATIENT)
Dept: CARE COORDINATION | Facility: CLINIC | Age: 59
End: 2024-12-03
Payer: COMMERCIAL

## 2024-12-05 ENCOUNTER — MYC MEDICAL ADVICE (OUTPATIENT)
Dept: FAMILY MEDICINE | Facility: CLINIC | Age: 59
End: 2024-12-05

## 2024-12-09 NOTE — TELEPHONE ENCOUNTER
Got it - this is for his FMLA form I think.  Can you get me the form I completed and I can addend and send back?  And let him know we're working on it?  Thank you!

## 2024-12-23 ENCOUNTER — OFFICE VISIT (OUTPATIENT)
Dept: ORTHOPEDICS | Facility: CLINIC | Age: 59
End: 2024-12-23
Attending: FAMILY MEDICINE
Payer: COMMERCIAL

## 2024-12-23 VITALS — WEIGHT: 260 LBS | BODY MASS INDEX: 37.22 KG/M2 | HEIGHT: 70 IN

## 2024-12-23 DIAGNOSIS — S46.012D TRAUMATIC COMPLETE TEAR OF LEFT ROTATOR CUFF, SUBSEQUENT ENCOUNTER: ICD-10-CM

## 2024-12-23 DIAGNOSIS — M25.512 ACUTE PAIN OF LEFT SHOULDER: ICD-10-CM

## 2024-12-23 PROCEDURE — 99203 OFFICE O/P NEW LOW 30 MIN: CPT | Performed by: STUDENT IN AN ORGANIZED HEALTH CARE EDUCATION/TRAINING PROGRAM

## 2024-12-23 NOTE — PATIENT INSTRUCTIONS
[facility]   [id number] [place] [address], [address]  [place] [address] [address] [place] [address]   Appointments: [phone] Appointments: [phone]   Fax: [phone] Fax: [phone]       1. Acute pain of left shoulder    2. Traumatic complete tear of left rotator cuff, subsequent encounter      Will receive call to schedule PT and have appointment scheduled for injection     Call my office with any questions or concerns, [phone].

## 2024-12-23 NOTE — LETTER
12/23/2024      Aubrey Zavala  [address]  [facility] [address]      Dear Colleague,    Thank you for referring your patient, Aubrey Zavala, to the [facility]. Please see a copy of my visit note below.    CC: Left Shoulder Pain    HPI: Patient is a a 59-year-old male seen here today with his wife for evaluation of left shoulder pain.  He states initially injured the shoulder 6 months ago.  He was climbing up a ladder when he struck the top of his left shoulder on a peg.  Since that time he has had pain over the shoulder and inability to lift his arm.  He feels that over the last 6 months his arm is becoming stiffer and more painful.  He localized the pain over the anterior lateral and posterior aspect of his shoulder.  His shoulder wakes him up at night.  It hurts with any attempted motion.  He was previously taking oxycodone as needed for the pain.  He has not done any formal physical therapy.  Is never an injection to his shoulder.  Denies any prior surgery of the shoulder.    Past medical history is significant for hypertension high cholesterol.  He is on Wegovy.  His BMI is 36.  His hemoglobin A1c is 5.6.  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  Enjoys walking and billiards for exercise and hobbies         Patient Active Problem List   Diagnosis     Hypertension, benign essential, goal below 140/90     Class 3 obesity     Sacroiliac joint pain     Prediabetes     Vitamin B12 deficiency (non anemic)     Hyperlipidemia LDL goal <100     Primary osteoarthritis of left knee          Past Medical History:   Diagnosis Date     Acute pain of left knee 09/12/2023     CARDIOVASCULAR SCREENING; LDL GOAL LESS THAN 130      Elevated serum creatinine 04/26/2023    Was taking aleve once a day  Now has stopped that  okay to use tylenol as needed for pain  Discussed increase water - alternate water and mt dew  Recheck today       Hypertension, benign essential, goal below 140/90  09/16/2016     Strain of lumbar region 06/22/2020     Unspecified essential hypertension           Past Surgical History:   Procedure Laterality Date     CLOSED RX METATARSAL FX  1978     HC TOOTH EXTRACTION W/FORCEP       INJECT JOINT SACROILIAC Bilateral 7/30/2021    Procedure: INJECTION, SACROILIAC JOINT;  Surgeon: Irvin Kelly MD;  Location: [facility] OR          Current Outpatient Medications   Medication Sig Dispense Refill     Alcohol Swabs PADS 1 each every 14 days 100 each 0     amLODIPine (NORVASC) 5 MG tablet Take 1 tablet (5 mg) by mouth daily. 90 tablet 3     atorvastatin (LIPITOR) 40 MG tablet Take 1 tablet (40 mg) by mouth daily 90 tablet 4     cyanocobalamin (CYANOCOBALAMIN) 1000 mcg/mL injection Inject 1 mL (1,000 mcg) into the muscle every 30 days. 3 mL 1     hydrochlorothiazide (HYDRODIURIL) 25 MG tablet Take 1 tablet (25 mg) by mouth daily 90 tablet 4     oxyCODONE (ROXICODONE) 5 MG tablet Take 1 tablet (5 mg) by mouth every 6 hours as needed for pain. 10 tablet 0     Semaglutide-Weight Management (WEGOVY) 1 MG/0.5ML pen Inject 1 mg Subcutaneous once a week 2 mL 3     Semaglutide-Weight Management (WEGOVY) 1.7 MG/0.75ML pen Inject 1.7 mg subcutaneously once a week. 3 mL 0     Semaglutide-Weight Management (WEGOVY) 2.4 MG/0.75ML pen INJECT 2.4 MG SUBCUTANEOUSLY ONCE A WEEK. 9 mL 4     traMADol (ULTRAM) 50 MG tablet Take 50 mg by mouth every 6 hours.            Allergies   Allergen Reactions     No Known Drug Allergy           Family History   Problem Relation Age of Onset     Hypertension Mother      Coronary Artery Disease Father      Cancer Brother         MENs disease     Hypertension Brother      Cancer Sister         MENs disease          Social History     Tobacco Use     Smoking status: Never     Passive exposure: Never     Smokeless tobacco: Never   Substance Use Topics     Alcohol use: Yes     Comment: occ            Objective:  Physical Exam:  LUE: No gross deformity of the shoulder.  No  open wounds or lacerations.  No surgical incisions.  No erythema or ecchymosis.  Pain to palpation over the clavicle, AC joint, acromion, and scapular spine.  Pain to palpation over the posterior joint line, lateral aspect of the shoulder, and long head of the biceps in the bicipital groove.  Passive range of motion 90 degrees forward flexion, 90 degrees abduction, 30 degrees external rotation, BL internal rotation limited by pain and mechanical block.  Active range of motion to 60 degrees of forward elevation with significant scapular hike.  Sensation intact to axillary, radial, median, and ulnar nerve distribution.    Imagin view x-ray of the left shoulder from 2024 is reviewed.  This shows no fracture or acute bony pathology.  Well-maintained glenohumeral joint space.  Moderate AC joint arthrosis.    MRI without contrast left shoulder from 2024 was reviewed.  This shows a small area of high-grade to full-thickness tearing of the leading edge of the supraspinatus.  No significant retraction.  High-grade tearing of the superior edge of the subscapularis with medial subluxation of the long head of the biceps.  No significant chondral wear.  Moderate AC joint arthrosis    Assessment and Plan: Patient is a 59-year-old male seen here today for evaluation of left shoulder pain.  On his x-ray and MRI he does show signs of AC joint arthrosis with high-grade tearing of the leading edge of the supraspinatus and subscapularis and medial long head biceps subluxation.  However on exam today he shows signs of significant adhesive capsulitis.  Suspect this is from his trauma and disuse over the last 6 months.  I discussed with him treatment options for adhesive capsulitis.  We discussed that the mainstay of treatment is nonoperative management with gentle stretching with physical therapy.  Discussed with him the role of intra-articular corticosteroid injection to help with pain.  Physical therapy.  I  discussed with him that this can take 3 to 6 months to completely resolve.  Is important to do his daily stretching exercises to see gradual improvement.  His range of motion improved his pain will also improve.  I also briefly discussed treatment options for small rotator cuff tears with long head biceps tendinosis.  Discussed nonoperative management form oral anti-inflammatory medication, activity modification, physical therapy, and the role of injection.  System that ultimately the surgical intervention would be an arthroscopic rotator cuff repair with long head biceps tenotomy versus open subpectoralis biceps tenodesis.  I discussed with him that he is not a candidate at this time because of his significant stiffness in his shoulder.  Operated on his shoulder now would lead to an unacceptably high rate of arthrofibrosis.  He would certainly have to resolve his adhesive capsulitis before becoming operative candidate.  At that time if he continues to have pain in the anterior and anterior lateral aspect of his shoulder consistent with his MRI findings I think he would be a reasonable candidate for arthroscopic rotator cuff repair with biceps tenotomy versus open subpectoralis biceps tenodesis.  I had the opportunity to answer his questions.    At this time I will get him set up with my colleague Dr. Holbrook at [place] for an ultrasound-guided left shoulder intra-articular corticosteroid injection to be followed by physical therapy for active and passive range of motion.  I recommend holding off on strengthening until he is regained full range of motion.  I am happy to see him back when his adhesive capsulitis has resolved to further discuss operative options for his rotator cuff.    Arcadio Sampson MD    [facility]   Department of Orthopedic Surgery    Disclaimer: This note consists of symbols derived from keyboarding, dictation and/or voice recognition software. As a result,  there may be errors in the script that have gone undetected. Please consider this when interpreting information found in this chart.         Again, thank you for allowing me to participate in the care of your patient.        Sincerely,        Arcadio Sampson MD    Electronically signed

## 2024-12-24 NOTE — PROGRESS NOTES
CC: Left Shoulder Pain    HPI: Patient is a a 59-year-old male seen here today with his wife for evaluation of left shoulder pain.  He states initially injured the shoulder 6 months ago.  He was climbing up a ladder when he struck the top of his left shoulder on a peg.  Since that time he has had pain over the shoulder and inability to lift his arm.  He feels that over the last 6 months his arm is becoming stiffer and more painful.  He localized the pain over the anterior lateral and posterior aspect of his shoulder.  His shoulder wakes him up at night.  It hurts with any attempted motion.  He was previously taking oxycodone as needed for the pain.  He has not done any formal physical therapy.  Is never an injection to his shoulder.  Denies any prior surgery of the shoulder.    Past medical history is significant for hypertension high cholesterol.  He is on Wegovy.  His BMI is 36.  His hemoglobin A1c is 5.6.  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  Enjoys walking and [employer] for exercise and hobbies         Patient Active Problem List   Diagnosis    Hypertension, benign essential, goal below 140/90    Class 3 obesity    Sacroiliac joint pain    Prediabetes    Vitamin B12 deficiency (non anemic)    Hyperlipidemia LDL goal <100    Primary osteoarthritis of left knee          Past Medical History:   Diagnosis Date    Acute pain of left knee 09/12/2023    CARDIOVASCULAR SCREENING; LDL GOAL LESS THAN 130     Elevated serum creatinine 04/26/2023    Was taking aleve once a day  Now has stopped that  okay to use tylenol as needed for pain  Discussed increase water - alternate water and mt dew  Recheck today      Hypertension, benign essential, goal below 140/90 09/16/2016    Strain of lumbar region 06/22/2020    Unspecified essential hypertension           Past Surgical History:   Procedure Laterality Date    CLOSED RX METATARSAL FX  1978    HC TOOTH EXTRACTION W/FORCEP      INJECT JOINT SACROILIAC Bilateral 7/30/2021     Procedure: INJECTION, SACROILIAC JOINT;  Surgeon: Irvin Kelly MD;  Location: [facility] OR          Current Outpatient Medications   Medication Sig Dispense Refill    Alcohol Swabs PADS 1 each every 14 days 100 each 0    amLODIPine (NORVASC) 5 MG tablet Take 1 tablet (5 mg) by mouth daily. 90 tablet 3    atorvastatin (LIPITOR) 40 MG tablet Take 1 tablet (40 mg) by mouth daily 90 tablet 4    cyanocobalamin (CYANOCOBALAMIN) 1000 mcg/mL injection Inject 1 mL (1,000 mcg) into the muscle every 30 days. 3 mL 1    hydrochlorothiazide (HYDRODIURIL) 25 MG tablet Take 1 tablet (25 mg) by mouth daily 90 tablet 4    oxyCODONE (ROXICODONE) 5 MG tablet Take 1 tablet (5 mg) by mouth every 6 hours as needed for pain. 10 tablet 0    Semaglutide-Weight Management (WEGOVY) 1 MG/0.5ML pen Inject 1 mg Subcutaneous once a week 2 mL 3    Semaglutide-Weight Management (WEGOVY) 1.7 MG/0.75ML pen Inject 1.7 mg subcutaneously once a week. 3 mL 0    Semaglutide-Weight Management (WEGOVY) 2.4 MG/0.75ML pen INJECT 2.4 MG SUBCUTANEOUSLY ONCE A WEEK. 9 mL 4    traMADol (ULTRAM) 50 MG tablet Take 50 mg by mouth every 6 hours.            Allergies   Allergen Reactions    No Known Drug Allergy           Family History   Problem Relation Age of Onset    Hypertension Mother     Coronary Artery Disease Father     Cancer Brother         MENs disease    Hypertension Brother     Cancer Sister         MENs disease          Social History     Tobacco Use    Smoking status: Never     Passive exposure: Never    Smokeless tobacco: Never   Substance Use Topics    Alcohol use: Yes     Comment: occ            Objective:  Physical Exam:  LUE: No gross deformity of the shoulder.  No open wounds or lacerations.  No surgical incisions.  No erythema or ecchymosis.  Pain to palpation over the clavicle, AC joint, acromion, and scapular spine.  Pain to palpation over the posterior joint line, lateral aspect of the shoulder, and long head of the biceps in the bicipital  groove.  Passive range of motion 90 degrees forward flexion, 90 degrees abduction, 30 degrees external rotation, BL internal rotation limited by pain and mechanical block.  Active range of motion to 60 degrees of forward elevation with significant scapular hike.  Sensation intact to axillary, radial, median, and ulnar nerve distribution.    Imagin view x-ray of the left shoulder from 2024 is reviewed.  This shows no fracture or acute bony pathology.  Well-maintained glenohumeral joint space.  Moderate AC joint arthrosis.    MRI without contrast left shoulder from 2024 was reviewed.  This shows a small area of high-grade to full-thickness tearing of the leading edge of the supraspinatus.  No significant retraction.  High-grade tearing of the superior edge of the subscapularis with medial subluxation of the long head of the biceps.  No significant chondral wear.  Moderate AC joint arthrosis    Assessment and Plan: Patient is a 59-year-old male seen here today for evaluation of left shoulder pain.  On his x-ray and MRI he does show signs of AC joint arthrosis with high-grade tearing of the leading edge of the supraspinatus and subscapularis and medial long head biceps subluxation.  However on exam today he shows signs of significant adhesive capsulitis.  Suspect this is from his trauma and disuse over the last 6 months.  I discussed with him treatment options for adhesive capsulitis.  We discussed that the mainstay of treatment is nonoperative management with gentle stretching with physical therapy.  Discussed with him the role of intra-articular corticosteroid injection to help with pain.  Physical therapy.  I discussed with him that this can take 3 to 6 months to completely resolve.  Is important to do his daily stretching exercises to see gradual improvement.  His range of motion improved his pain will also improve.  I also briefly discussed treatment options for small rotator cuff tears with  long head biceps tendinosis.  Discussed nonoperative management form oral anti-inflammatory medication, activity modification, physical therapy, and the role of injection.  System that ultimately the surgical intervention would be an arthroscopic rotator cuff repair with long head biceps tenotomy versus open subpectoralis biceps tenodesis.  I discussed with him that he is not a candidate at this time because of his significant stiffness in his shoulder.  Operated on his shoulder now would lead to an unacceptably high rate of arthrofibrosis.  He would certainly have to resolve his adhesive capsulitis before becoming operative candidate.  At that time if he continues to have pain in the anterior and anterior lateral aspect of his shoulder consistent with his MRI findings I think he would be a reasonable candidate for arthroscopic rotator cuff repair with biceps tenotomy versus open subpectoralis biceps tenodesis.  I had the opportunity to answer his questions.    At this time I will get him set up with my colleague Dr. Holbrook at [place] for an ultrasound-guided left shoulder intra-articular corticosteroid injection to be followed by physical therapy for active and passive range of motion.  I recommend holding off on strengthening until he is regained full range of motion.  I am happy to see him back when his adhesive capsulitis has resolved to further discuss operative options for his rotator cuff.    Arcadio Sampson MD    [facility]   Department of Orthopedic Surgery    Disclaimer: This note consists of symbols derived from keyboarding, dictation and/or voice recognition software. As a result, there may be errors in the script that have gone undetected. Please consider this when interpreting information found in this chart.

## 2024-12-27 NOTE — PROGRESS NOTES
"  Assessment & Plan     Adhesive capsulitis of left shoulder  Patient presents for injection of the GHJ to hopefully improve his adhesive capsulitis. He has some RTC tears noted on MRI and discussed with a surgeon. But limited in surgical interventions due to his adhesive capsulitis. Injection completed at the request of Dr. Sampson- did a large volume GHJ injection. Patient noted mild relief afterwards. No complications.           BMI  Estimated body mass index is 37.31 kg/m  as calculated from the following:    Height as of 12/23/24: 1.778 m (5' 10\").    Weight as of 12/23/24: 117.9 kg (260 lb).             No follow-ups on file.    Dick Burgos is a 60 year old, presenting for the following health issues:  Pain of the Left Shoulder    HPI     Injection: Left GHJ    Have you seen another provider for this: Yes, Dr. Sampson  What treatments have you completed? [facility] Treatments Tried : Rest/Activity Avoidance, OTC meds (tylenol, ibuprofen), Topicals (ice/heat, voltaren), PT/OT (many visits), and Home exercises    Previous injection (+date): no  Have you completed PT: Yes  Previous imaging: MRI Left Shoulder 11/29Impression:  1. Full thickness, near full width tear of the anterior and mid  supraspinatus tendon with tendon retraction of about 8 mm. The  posterior junctional fibers with the infraspinatus tendon are intact.  Intact infraspinatus tendon.  2. There is a full-thickness tear of the far superior fibers of the  subscapularis tendon. Associated mild medial subluxation of the biceps  tendon.  3. Preserved muscle bulk, indicative of acute tear.  4. Large joint effusion.  5. Chronic-appearing ossicle at the distal clavicle without bone  marrow edema.  6. Subtle posterior labral tear.    Iodine allergy: No  History of steroid reaction: No  Diabetes: No  Taking a blood thinner: No  Recent immunization: No  CKD: No               Objective    There were no vitals taken for this visit.  There is no height or weight " on file to calculate BMI.  Physical Exam   Exam is limited to L shoulder:   Shoulder appeared grossly normal without deformity, swelling, or discoloration No open wounds or rashes appreciated. TTP noted on posterior and lateral shoulder. AROM was significantly limited. Strength was 4/5.  Sensation was grossly intact.                 Signed Electronically by: Shankar Haile MD    Large Joint Injection/Arthocentesis: L glenohumeral joint    Date/Time: 12/30/2024 3:48 PM    Performed by: Shankar Haile MD  Authorized by: Shankar Haile MD    Indications:  Pain  Needle Size:  22 G  Guidance: ultrasound    Approach:  Posterior  Location:  Shoulder   Location comment:  10 mL of Saline Solution      Site:  L glenohumeral joint  Medications:  40 mg methylPREDNISolone 40 MG/ML; 2 mL lidocaine 1 %; 7 mL ROPivacaine 5 MG/ML  Medications comment:  10 mL Saline Solution  Outcome:  Tolerated well, no immediate complications  Procedure discussed: discussed risks, benefits, and alternatives    Consent Given by:  Patient  Timeout: timeout called immediately prior to procedure    Prep: patient was prepped and draped in usual sterile fashion     Ultrasound was used to ensure safe and accurate needle placement and injection. Ultrasound images of the procedure were permanently stored.

## 2024-12-30 ENCOUNTER — OFFICE VISIT (OUTPATIENT)
Dept: ORTHOPEDICS | Facility: CLINIC | Age: 59
End: 2024-12-30
Payer: COMMERCIAL

## 2024-12-30 DIAGNOSIS — M75.02 ADHESIVE CAPSULITIS OF LEFT SHOULDER: Primary | ICD-10-CM

## 2024-12-30 PROCEDURE — 20611 DRAIN/INJ JOINT/BURSA W/US: CPT | Mod: LT | Performed by: STUDENT IN AN ORGANIZED HEALTH CARE EDUCATION/TRAINING PROGRAM

## 2024-12-30 PROCEDURE — 99207 PR DROP WITH A PROCEDURE: CPT | Performed by: STUDENT IN AN ORGANIZED HEALTH CARE EDUCATION/TRAINING PROGRAM

## 2024-12-30 RX ADMIN — METHYLPREDNISOLONE ACETATE 40 MG: 40 INJECTION, SUSPENSION INTRA-ARTICULAR; INTRALESIONAL; INTRAMUSCULAR; SOFT TISSUE at 15:48

## 2024-12-30 RX ADMIN — ROPIVACAINE HYDROCHLORIDE 7 ML: 5 INJECTION, SOLUTION EPIDURAL; INFILTRATION; PERINEURAL at 15:48

## 2024-12-30 RX ADMIN — LIDOCAINE HYDROCHLORIDE 2 ML: 10 INJECTION, SOLUTION INFILTRATION; PERINEURAL at 15:48

## 2024-12-30 NOTE — PATIENT INSTRUCTIONS
"You just had a steroid (kenalog) injection:   Steroid injections are among the most frequently use treatments in orthopedics.  They are used for a wide range of conditions from arthritis, bursitis, tennis elbow, etc.    The 2 most common side effects of steroid shots are called \"steroid flare\" and \"steroid flush\".      Steroid flare can cause an increase in symptoms in the first 24- 48 hours after injection.  This will usually subside within a few days, and is caused from the additional fluid in the joint, and trauma to the joint lining from the injection.  This pain usually subsides quickly and can be aided with an ice pack and over-the-counter anti-inflammatory medications.    Steroid flush is a flushing sensation and redness to the face.  This reaction is more common in women but can occur in men as well and is seen in up to 15% of patients.  This can begin within a few hours of the injection may last for a few days.  It is not dangerous and will self resolve.    Diabetic patients also can have the blood sugars affected.  Patients with diabetes should carefully monitor the blood sugars as steroids can cause a temporary rise in levels.  Patient is taking insulin should be especially careful checking her blood sugar often adjusting insulin doses as necessary.      Steroid injections can only be repeated every 3 to 4 months.  There are some conditions there are may also be other limitations in the number of injections that is deemed safe to complete.     Risks:   Infection: Whenever there is a break in the skin, liquid and needle was used to administer the area, there is a chance of infection.  This is very unlikely to have been and would usually occur days after injection.  To prevent this we use procedures to keep the area clean.  Signs and symptoms to watch for include fever, redness, pus, foul odor, spreading warmth that continues to get worse.  If this happens please call the office to be seen.  If the office " is closed please go to an urgent care or emergency room.    Skin Pigmented Changes: Patient is also aware that steroids may cause skin around the injection site to the right knee.  This is not harmful or long-lasting.    Loss of Fatty Tissue: High doses of steroid can have detrimental effects on some tissues in the body, though due to the dose which we use the risk is extremely rare.  When injected into fatty tissue, steroids can lead to a problem called fat atrophy.  Fat atrophy causes loss of fatty tissue which can lead to a dimpling of the skin or thinning out of the fat.  Skin will feel thin.  Patient who get steroid injections in the heel to treat plantar fasciitis may find it pain with walking because of the fat thinning out.    Tendon Rupture: Steroids can cause a weakening of the tendons.  This is one reason to limit the number of injections.

## 2024-12-30 NOTE — LETTER
"12/30/2024      Aubrey Zavala  [address]  [facility] [address]      Dear Colleague,    Thank you for referring your patient, Aubrey Zavala, to the [facility] [place]. Please see a copy of my visit note below.      Assessment & Plan    Adhesive capsulitis of left shoulder  Patient presents for injection of the GHJ to hopefully improve his adhesive capsulitis. He has some RTC tears noted on MRI and discussed with a surgeon. But limited in surgical interventions due to his adhesive capsulitis. Injection completed at the request of Dr. Sampson- did a large volume GHJ injection. Patient noted mild relief afterwards. No complications.           BMI  Estimated body mass index is 37.31 kg/m  as calculated from the following:    Height as of 12/23/24: 1.778 m (5' 10\").    Weight as of 12/23/24: 117.9 kg (260 lb).             No follow-ups on file.    Subjective  Aubrey is a 60 year old, presenting for the following health issues:  Pain of the Left Shoulder    HPI     Injection: Left GHJ    Have you seen another provider for this: Yes, Dr. Sampson  What treatments have you completed? MSK Treatments Tried : Rest/Activity Avoidance, OTC meds (tylenol, ibuprofen), Topicals (ice/heat, voltaren), PT/OT (many visits), and Home exercises    Previous injection (+date): no  Have you completed PT: Yes  Previous imaging: MRI Left Shoulder 11/29Impression:  1. Full thickness, near full width tear of the anterior and mid  supraspinatus tendon with tendon retraction of about 8 mm. The  posterior junctional fibers with the infraspinatus tendon are intact.  Intact infraspinatus tendon.  2. There is a full-thickness tear of the far superior fibers of the  subscapularis tendon. Associated mild medial subluxation of the biceps  tendon.  3. Preserved muscle bulk, indicative of acute tear.  4. Large joint effusion.  5. Chronic-appearing ossicle at the distal clavicle without bone  marrow edema.  6. " Subtle posterior labral tear.    Iodine allergy: No  History of steroid reaction: No  Diabetes: No  Taking a blood thinner: No  Recent immunization: No  CKD: No               Objective   There were no vitals taken for this visit.  There is no height or weight on file to calculate BMI.  Physical Exam   Exam is limited to L shoulder:   Shoulder appeared grossly normal without deformity, swelling, or discoloration No open wounds or rashes appreciated. TTP noted on posterior and lateral shoulder. AROM was significantly limited. Strength was 4/5.  Sensation was grossly intact.                 Signed Electronically by: Shankar Haile MD    Large Joint Injection/Arthocentesis: L glenohumeral joint    Date/Time: 12/30/2024 3:48 PM    Performed by: Shankar Haile MD  Authorized by: Shankar Haile MD    Indications:  Pain  Needle Size:  22 G  Guidance: ultrasound    Approach:  Posterior  Location:  Shoulder   Location comment:  10 mL of Saline Solution      Site:  L glenohumeral joint  Medications:  40 mg methylPREDNISolone 40 MG/ML; 2 mL lidocaine 1 %; 7 mL ROPivacaine 5 MG/ML  Medications comment:  10 mL Saline Solution  Outcome:  Tolerated well, no immediate complications  Procedure discussed: discussed risks, benefits, and alternatives    Consent Given by:  Patient  Timeout: timeout called immediately prior to procedure    Prep: patient was prepped and draped in usual sterile fashion     Ultrasound was used to ensure safe and accurate needle placement and injection. Ultrasound images of the procedure were permanently stored.          Again, thank you for allowing me to participate in the care of your patient.        Sincerely,        Shankar Haile MD    Electronically signed

## 2025-01-12 RX ORDER — METHYLPREDNISOLONE ACETATE 40 MG/ML
40 INJECTION, SUSPENSION INTRA-ARTICULAR; INTRALESIONAL; INTRAMUSCULAR; SOFT TISSUE
Status: COMPLETED | OUTPATIENT
Start: 2024-12-30 | End: 2024-12-30

## 2025-01-12 RX ORDER — ROPIVACAINE HYDROCHLORIDE 5 MG/ML
7 INJECTION, SOLUTION EPIDURAL; INFILTRATION; PERINEURAL
Status: COMPLETED | OUTPATIENT
Start: 2024-12-30 | End: 2024-12-30

## 2025-01-12 RX ORDER — LIDOCAINE HYDROCHLORIDE 10 MG/ML
2 INJECTION, SOLUTION INFILTRATION; PERINEURAL
Status: COMPLETED | OUTPATIENT
Start: 2024-12-30 | End: 2024-12-30

## 2025-01-17 PROBLEM — S46.012D TRAUMATIC COMPLETE TEAR OF LEFT ROTATOR CUFF, SUBSEQUENT ENCOUNTER: Status: ACTIVE | Noted: 2025-01-17

## 2025-01-17 PROBLEM — M25.512 ACUTE PAIN OF LEFT SHOULDER: Status: ACTIVE | Noted: 2025-01-17

## 2025-01-29 ENCOUNTER — THERAPY VISIT (OUTPATIENT)
Dept: PHYSICAL THERAPY | Facility: CLINIC | Age: 60
End: 2025-01-29
Payer: COMMERCIAL

## 2025-01-29 DIAGNOSIS — M25.512 ACUTE PAIN OF LEFT SHOULDER: Primary | ICD-10-CM

## 2025-01-29 DIAGNOSIS — S46.012D TRAUMATIC COMPLETE TEAR OF LEFT ROTATOR CUFF, SUBSEQUENT ENCOUNTER: ICD-10-CM

## 2025-01-29 PROCEDURE — 97140 MANUAL THERAPY 1/> REGIONS: CPT | Mod: GP | Performed by: PHYSICAL THERAPIST

## 2025-01-29 PROCEDURE — 97110 THERAPEUTIC EXERCISES: CPT | Mod: GP | Performed by: PHYSICAL THERAPIST

## 2025-02-11 ENCOUNTER — THERAPY VISIT (OUTPATIENT)
Dept: PHYSICAL THERAPY | Facility: CLINIC | Age: 60
End: 2025-02-11
Payer: COMMERCIAL

## 2025-02-11 DIAGNOSIS — S46.012D TRAUMATIC COMPLETE TEAR OF LEFT ROTATOR CUFF, SUBSEQUENT ENCOUNTER: ICD-10-CM

## 2025-02-11 DIAGNOSIS — M25.512 ACUTE PAIN OF LEFT SHOULDER: Primary | ICD-10-CM

## 2025-02-11 PROCEDURE — 97140 MANUAL THERAPY 1/> REGIONS: CPT | Mod: GP

## 2025-02-11 PROCEDURE — 97110 THERAPEUTIC EXERCISES: CPT | Mod: GP

## 2025-03-03 ENCOUNTER — ANCILLARY PROCEDURE (OUTPATIENT)
Dept: GENERAL RADIOLOGY | Facility: CLINIC | Age: 60
End: 2025-03-03
Attending: PHYSICIAN ASSISTANT
Payer: COMMERCIAL

## 2025-03-03 ENCOUNTER — OFFICE VISIT (OUTPATIENT)
Dept: URGENT CARE | Facility: URGENT CARE | Age: 60
End: 2025-03-03
Payer: COMMERCIAL

## 2025-03-03 VITALS
OXYGEN SATURATION: 99 % | WEIGHT: 250 LBS | HEART RATE: 86 BPM | HEIGHT: 70 IN | DIASTOLIC BLOOD PRESSURE: 88 MMHG | SYSTOLIC BLOOD PRESSURE: 124 MMHG | BODY MASS INDEX: 35.79 KG/M2 | RESPIRATION RATE: 18 BRPM | TEMPERATURE: 97.6 F

## 2025-03-03 DIAGNOSIS — R07.81 RIB PAIN ON LEFT SIDE: Primary | ICD-10-CM

## 2025-03-03 DIAGNOSIS — E66.813 CLASS 3 OBESITY: ICD-10-CM

## 2025-03-03 PROCEDURE — 71101 X-RAY EXAM UNILAT RIBS/CHEST: CPT | Mod: TC | Performed by: RADIOLOGY

## 2025-03-03 PROCEDURE — 3074F SYST BP LT 130 MM HG: CPT | Performed by: PHYSICIAN ASSISTANT

## 2025-03-03 PROCEDURE — 99213 OFFICE O/P EST LOW 20 MIN: CPT | Performed by: PHYSICIAN ASSISTANT

## 2025-03-03 PROCEDURE — 3079F DIAST BP 80-89 MM HG: CPT | Performed by: PHYSICIAN ASSISTANT

## 2025-03-03 RX ORDER — HYDROCODONE BITARTRATE AND ACETAMINOPHEN 5; 325 MG/1; MG/1
1 TABLET ORAL EVERY 6 HOURS PRN
Qty: 18 TABLET | Refills: 0 | Status: SHIPPED | OUTPATIENT
Start: 2025-03-03 | End: 2025-03-06

## 2025-03-03 ASSESSMENT — ENCOUNTER SYMPTOMS: SHORTNESS OF BREATH: 1

## 2025-03-03 NOTE — PROGRESS NOTES
SUBJECTIVE:   Aubrey Zavala is a 60 year old male presenting with a chief complaint of   Chief Complaint   Patient presents with    Urgent Care     Pushing a suitcase with case of pop on top, hit a crack on the sidewalk and fell, landed on top of arm and hit concrete.        He is an established patient of [facility].  Patient presents with left rib pain.  A bit SOB.  Patient fell over a suitcase with case of pop on it yesterday    Treatment:  tylenol    Review of Systems   Respiratory:  Positive for shortness of breath.    Cardiovascular:  Positive for chest pain.   All other systems reviewed and are negative.      Past Medical History:   Diagnosis Date    Acute pain of left knee 09/12/2023    CARDIOVASCULAR SCREENING; LDL GOAL LESS THAN 130     Elevated serum creatinine 04/26/2023    Was taking aleve once a day  Now has stopped that  okay to use tylenol as needed for pain  Discussed increase water - alternate water and mt dew  Recheck today      Hypertension, benign essential, goal below 140/90 09/16/2016    Strain of lumbar region 06/22/2020    Unspecified essential hypertension      Family History   Problem Relation Age of Onset    Hypertension Mother     Coronary Artery Disease Father     Cancer Brother         MENs disease    Hypertension Brother     Cancer Sister         MENs disease     Current Outpatient Medications   Medication Sig Dispense Refill    amLODIPine (NORVASC) 5 MG tablet Take 1 tablet (5 mg) by mouth daily. 90 tablet 3    atorvastatin (LIPITOR) 40 MG tablet Take 1 tablet (40 mg) by mouth daily 90 tablet 4    cyanocobalamin (CYANOCOBALAMIN) 1000 mcg/mL injection Inject 1 mL (1,000 mcg) into the muscle every 30 days. 3 mL 1    hydrochlorothiazide (HYDRODIURIL) 25 MG tablet Take 1 tablet (25 mg) by mouth daily 90 tablet 4    Semaglutide-Weight Management (WEGOVY) 2.4 MG/0.75ML pen INJECT 2.4 MG SUBCUTANEOUSLY ONCE A WEEK. 9 mL 4    Alcohol Swabs PADS 1 each every 14 days 100 each 0    oxyCODONE  "(ROXICODONE) 5 MG tablet Take 1 tablet (5 mg) by mouth every 6 hours as needed for pain. 10 tablet 0    Semaglutide-Weight Management (WEGOVY) 1 MG/0.5ML pen Inject 1 mg Subcutaneous once a week 2 mL 3    Semaglutide-Weight Management (WEGOVY) 1.7 MG/0.75ML pen Inject 1.7 mg subcutaneously once a week. 3 mL 0    traMADol (ULTRAM) 50 MG tablet Take 50 mg by mouth every 6 hours.       Social History     Tobacco Use    Smoking status: Never     Passive exposure: Never    Smokeless tobacco: Never   Substance Use Topics    Alcohol use: Yes     Comment: occ       OBJECTIVE  /88   Pulse 86   Temp 97.6  F (36.4  C) (Temporal)   Resp 18   Ht 1.778 m (5' 10\")   Wt 113.4 kg (250 lb)   SpO2 99%   BMI 35.87 kg/m      Physical Exam  Vitals reviewed.   Constitutional:       Appearance: Normal appearance. He is obese.   Cardiovascular:      Rate and Rhythm: Normal rate and regular rhythm.   Pulmonary:      Comments: Left side ribs:  no ecchymosis.  Tenderness to palpation of lower ribs.  Pain with AP and lateral compression.    Chest:      Chest wall: Tenderness present.   Neurological:      General: No focal deficit present.      Mental Status: He is alert.         Labs:  Results for orders placed or performed in visit on 03/03/25 (from the past 24 hours)   XR Ribs & Chest Left G/E 3 Views    Narrative    EXAM: XR RIBS AND CHEST LEFT 3 VIEWS  LOCATION: [facility]  DATE: 3/3/2025    INDICATION:  Rib pain on left side  COMPARISON: None.      Impression    IMPRESSION: The visualized heart and lungs are negative. No rib fractures.       X-Ray was done, my findings are: Xrays reviewed by myself and independently interpreted.  Any significant discrepancies with official radiologic read, patient will be notified.      No fx.    ASSESSMENT:      ICD-10-CM    1. Rib pain on left side  R07.81 XR Ribs & Chest Left G/E 3 Views      2. Class 3 obesity  E66.813            Medical Decision " Making:    Differential Diagnosis:  MS Injury Pain: fracture, contusion, and pneumothorax    Serious Comorbid Conditions:  Adult:   reviewed    PLAN:    Discussed the importance of deep breathes, slowly.  Discussed the course of rib injuries.  Discussed reasons to seek immediate medical attention.  Additionally if no improvement or worsening in one week, may follow up with PCP and/or UC.    Rx for norco.  No etoh while on norco or driving.  May constipate.      Followup:    If not improving or if condition worsens, follow up with your Primary Care Provider, If not improving or if conditions worsens over the next 12-24 hours, go to the Emergency Department    There are no Patient Instructions on file for this visit.

## 2025-04-28 ENCOUNTER — MYC REFILL (OUTPATIENT)
Dept: FAMILY MEDICINE | Facility: CLINIC | Age: 60
End: 2025-04-28
Payer: COMMERCIAL

## 2025-04-28 DIAGNOSIS — I10 HYPERTENSION, ESSENTIAL: ICD-10-CM

## 2025-04-28 DIAGNOSIS — E66.813 CLASS 3 SEVERE OBESITY DUE TO EXCESS CALORIES WITHOUT SERIOUS COMORBIDITY WITH BODY MASS INDEX (BMI) OF 40.0 TO 44.9 IN ADULT (H): ICD-10-CM

## 2025-04-28 RX ORDER — AMLODIPINE BESYLATE 5 MG/1
5 TABLET ORAL DAILY
Qty: 90 TABLET | Refills: 1 | Status: SHIPPED | OUTPATIENT
Start: 2025-04-28

## 2025-05-14 DIAGNOSIS — E53.8 VITAMIN B12 DEFICIENCY (NON ANEMIC): Primary | ICD-10-CM

## 2025-05-14 NOTE — TELEPHONE ENCOUNTER
"Dr. Abreu-Please review, sign if agree and may close encounter.      No syringe order active on med list.    Writer called patient:  Does not want 22G syringe to administer Vitamin B-12 injection  Would like to go back to using 23 G x 1.5\" syringe for Vitamin B-12 injection    Informed patient request will be sent to Dr. Abreu.    Patient verbalized understanding and in agreement with plan.    Thank you!  CASI Joshi, RN-[facility]        "

## 2025-05-14 NOTE — TELEPHONE ENCOUNTER
"  Medication Question or Refill    Contacts       Contact Date/Time Type Contact Phone/Fax    05/14/2025 04:32 PM CDT Phone (Incoming) [place] Aubrey VILLA (Self) [phone] (H)          Unable to pend request as it was discontinued.     What medication are you calling about (include dose and sig)?: syringe (?) X 1-1/2\" 3 [place] [facility] [place]acy[place]   [place] [address] [place] [address] [address]  Phone: [phone] Fax: [phone]        Controlled Substance Agreement on file:   CSA -- Patient Level:    CSA: None found at the patient level.       Who prescribed the medication?: Dr. Abreu    Do you need a refill? Yes    When did you use the medication last? A while ago    Patient offered an appointment? No    Do you have any questions or concerns?  Yes: Patient previously had yringe 22G X 1-1/2\" 3 ML but that does not work well. Need a different size gauge.      Could we send this information to you in [facility] or would you prefer to receive a phone call?:   Patient would prefer a phone call   Okay to leave a detailed message?: No at Cell number on file:    Telephone Information:   Mobile [phone]      "

## 2025-05-25 ENCOUNTER — HEALTH MAINTENANCE LETTER (OUTPATIENT)
Age: 60
End: 2025-05-25

## 2025-06-05 ENCOUNTER — OFFICE VISIT (OUTPATIENT)
Dept: FAMILY MEDICINE | Facility: CLINIC | Age: 60
End: 2025-06-05
Attending: FAMILY MEDICINE
Payer: COMMERCIAL

## 2025-06-05 VITALS
TEMPERATURE: 97.5 F | HEART RATE: 73 BPM | SYSTOLIC BLOOD PRESSURE: 122 MMHG | HEIGHT: 70 IN | WEIGHT: 258.3 LBS | OXYGEN SATURATION: 99 % | BODY MASS INDEX: 36.98 KG/M2 | DIASTOLIC BLOOD PRESSURE: 80 MMHG | RESPIRATION RATE: 14 BRPM

## 2025-06-05 DIAGNOSIS — Z00.00 ROUTINE GENERAL MEDICAL EXAMINATION AT A HEALTH CARE FACILITY: Primary | ICD-10-CM

## 2025-06-05 DIAGNOSIS — E53.8 VITAMIN B12 DEFICIENCY (NON ANEMIC): ICD-10-CM

## 2025-06-05 DIAGNOSIS — I10 HYPERTENSION GOAL BP (BLOOD PRESSURE) < 140/90: ICD-10-CM

## 2025-06-05 DIAGNOSIS — M53.3 SACROILIAC JOINT PAIN: ICD-10-CM

## 2025-06-05 DIAGNOSIS — R73.03 PREDIABETES: ICD-10-CM

## 2025-06-05 DIAGNOSIS — E66.813 CLASS 3 OBESITY (H): ICD-10-CM

## 2025-06-05 DIAGNOSIS — E78.5 HYPERLIPIDEMIA LDL GOAL <100: ICD-10-CM

## 2025-06-05 DIAGNOSIS — M17.12 PRIMARY OSTEOARTHRITIS OF LEFT KNEE: ICD-10-CM

## 2025-06-05 DIAGNOSIS — Z12.5 SCREENING FOR PROSTATE CANCER: ICD-10-CM

## 2025-06-05 PROBLEM — M25.512 ACUTE PAIN OF LEFT SHOULDER: Status: RESOLVED | Noted: 2025-01-17 | Resolved: 2025-06-05

## 2025-06-05 LAB
EST. AVERAGE GLUCOSE BLD GHB EST-MCNC: 100 MG/DL
HBA1C MFR BLD: 5.1 % (ref 0–5.6)

## 2025-06-05 RX ORDER — SEMAGLUTIDE 2.4 MG/.75ML
2.4 INJECTION, SOLUTION SUBCUTANEOUS WEEKLY
Qty: 9 ML | Refills: 4 | Status: SHIPPED | OUTPATIENT
Start: 2025-06-05

## 2025-06-05 RX ORDER — HYDROCHLOROTHIAZIDE 25 MG/1
25 TABLET ORAL DAILY
Qty: 90 TABLET | Refills: 3 | Status: SHIPPED | OUTPATIENT
Start: 2025-06-05

## 2025-06-05 RX ORDER — ATORVASTATIN CALCIUM 40 MG/1
40 TABLET, FILM COATED ORAL DAILY
Qty: 90 TABLET | Refills: 3 | Status: SHIPPED | OUTPATIENT
Start: 2025-06-05

## 2025-06-05 RX ORDER — CYANOCOBALAMIN 1000 UG/ML
1 INJECTION, SOLUTION INTRAMUSCULAR; SUBCUTANEOUS
Qty: 3 ML | Refills: 3 | Status: SHIPPED | OUTPATIENT
Start: 2025-06-05

## 2025-06-05 RX ORDER — AMLODIPINE BESYLATE 5 MG/1
5 TABLET ORAL DAILY
Qty: 90 TABLET | Refills: 3 | Status: SHIPPED | OUTPATIENT
Start: 2025-06-05

## 2025-06-05 SDOH — HEALTH STABILITY: PHYSICAL HEALTH: ON AVERAGE, HOW MANY DAYS PER WEEK DO YOU ENGAGE IN MODERATE TO STRENUOUS EXERCISE (LIKE A BRISK WALK)?: 0 DAYS

## 2025-06-05 SDOH — HEALTH STABILITY: PHYSICAL HEALTH: ON AVERAGE, HOW MANY MINUTES DO YOU ENGAGE IN EXERCISE AT THIS LEVEL?: 0 MIN

## 2025-06-05 ASSESSMENT — SOCIAL DETERMINANTS OF HEALTH (SDOH): HOW OFTEN DO YOU GET TOGETHER WITH FRIENDS OR RELATIVES?: ONCE A WEEK

## 2025-06-05 ASSESSMENT — PAIN SCALES - GENERAL: PAINLEVEL_OUTOF10: NO PAIN (0)

## 2025-06-05 NOTE — PROGRESS NOTES
Preventive Care Visit  [facility]  Anabel Abreu MD, Family Medicine  Jun 5, 2025      Assessment & Plan     Problem List as of 6/5/2025 Reviewed: 6/5/2025  5:53 PM by Anabel Abreu MD            Noted       High    1. Routine general medical examination at a health care facility - Primary 6/5/2025     Overview Addendum 6/5/2025  5:54 PM by Anabel Abreu MD   60 year old well known to me (since 2022) here today for check up.  Wife Szuanne.  Works for [employer], other [employer].  Busy at work, hopes to retire ~62-65.     Family history of early death in dad and brother - but they both had MEN1, he has been tested and does not.    Biometric Forms for work given - at my desk, will complete and fax back once results in.    Preventative Plan:  Labs: PSA screen. Remainder problem based -see below.  Colon: up to date, due Cologuard 7/2026   Immunizations: 2nd HepB, PCV2 done today    Healthy lifestyle and healthy aging recommendations reinforced including the importance of:  regular exercise & weight lifting struggles with this as still knee pain - will see ortho for injection  adequate and regular sleep, at least 7 hrs nightly **  5+ fruits and veggies daily, whole grains, limit processed food **  social connections **            Medium    2. Class 3 obesity (H) 10/15/2018     Overview Addendum 6/5/2025  5:45 PM by Anabel Abreu MD   06/05/2025 A/P:  Now >45 lb weight loss on Wegovy 2.4 mg started 7/2024.  Feeling much better (although still back and knee pain)  Covered by his insurance (we did prior auth 7/2024)  Plan:  Refilled next 3 months of 2.4 mg  Will try to get Zepbound covered as seems to have plateaued.  Sent to pharmacy.  If denied, continue Wegovy 2.4 mg.    2024: BMI 42.  Ordered Wegovy 5/2024, will do prior Auth.          Relevant Medications     Semaglutide-Weight Management (WEGOVY) 2.4 MG/0.75ML pen     tirzepatide-Weight Management  (ZEPBOUND) 15 MG/0.5ML prefilled pen    3. Hyperlipidemia LDL goal <100 4/26/2023     Overview Addendum 6/5/2025  5:46 PM by Anabel Abreu MD   06/05/2025 A/P: On atorvastatin (Lipitor) 40 mg, stable.  Refilled and check labs today.  Never did CT CAC test - but happy to order again if he changes his mind.  2024: Continue atorvastatin 40.  Reordered CT calcium test  2023: With elevated ASCVD score. start lipitor            Relevant Medications     atorvastatin (LIPITOR) 40 MG tablet     Other Relevant Orders     Lipid panel reflex to direct LDL Non-fasting    4. Hypertension goal BP (blood pressure) < 140/90 9/16/2016     Overview Addendum 6/5/2025  5:46 PM by Anabel Abreu MD   06/05/2025 A/P: Stable and well controlled on current medications. Continue HCTZ 25 mg and amlodipine 5 mg, avoid NSAIDs. Check BMP today and yearly.  Refilled.    2023: With kidney injury potentially associated with lisinopril and NSAID use.  Nephrologist stopped lisinopril.         5. Prediabetes 1/14/2022     Overview Addendum 6/5/2025  5:47 PM by Anabel Abreu MD   06/05/2025 A/P: Recheck A1c today  2021: gluc 109 fasting. Check A1C yearly    Hemoglobin A1C   Date Value Ref Range Status   06/05/2025 5.1 0.0 - 5.6 % Final     Comment:     Normal <5.7%   Prediabetes 5.7-6.4%    Diabetes 6.5% or higher     Note: Adopted from ADA consensus guidelines.   07/10/2017 5.0 4.3 - 6.0 % Final                 Relevant Orders     HEMOGLOBIN A1C (Completed)    6. Primary osteoarthritis of left knee 5/15/2024     Overview Addendum 6/5/2025  5:52 PM by Anabel Arbeu MD   06/05/2025 A/P: Injection helped, then faded.  Will get back in to see ortho.  Encouraged him to consider surgery if doesn't improve as exercise will help him continue to lose weight and feel better.  2024: Follows with Dr. Watkins.  Injection in January helpful then faded.  Recommend follow-up with Ortho and weight loss         7. Sacroiliac joint pain 7/23/2021     " Overview Addendum 6/5/2025  5:53 PM by Anabel Abreu MD   06/05/2025 A/P: Still has - but helps a lot to be able to work from home and change position/lie down for work.  FMLA forms completed 10/2024.  Will need again in 2025 - send to us in October and we can copy & update last year's forms for 2025.    2024: Continues with severe back pain.  Recommend weight loss, will start GLP-1 if covered by insurance  2021: followed by neurosurg Dr. Kelly, along with lumbar spondylosis, DD, lumbar facet arthropathy         8. Vitamin B12 deficiency (non anemic) 4/26/2023     Overview Addendum 6/5/2025  5:54 PM by Anabel Abreu MD   06/05/2025 A/P:  B12 injections monthly.  Will recheck today, adjust if needed     2024: recheck B12, adjust dose frequency if needed.  Has been taking every 2 weeks.  2023: Mom had as well. Eats full normal diet. Suspect pernicious anemia.  Start injections          Relevant Medications     cyanocobalamin (CYANOCOBALAMIN) 1000 mcg/mL injection     Other Relevant Orders     Vitamin B12             BMI  Estimated body mass index is 37.06 kg/m  as calculated from the following:    Height as of this encounter: 1.778 m (5' 10\").    Weight as of this encounter: 117.2 kg (258 lb 4.8 oz).   Weight management plan: Discussed healthy diet and exercise guidelines    Counseling  Appropriate preventive services were addressed with this patient via screening, questionnaire, or discussion as appropriate for fall prevention, nutrition, physical activity, Tobacco-use cessation, social engagement, weight loss and cognition.  Checklist reviewing preventive services available has been given to the patient.  Reviewed patient's diet, addressing concerns and/or questions.       Dick Burgos is a 60 year old, presenting for the following:  Physical (Pt will like to discuss about his weight medications. )        6/5/2025     2:58 PM   Additional Questions   Roomed by Palmer Menjivar   Accompanied by Self    " "      HPI  Aubrey Zavala, 60 years    Weight Management  - Weight decreased by 45 lbs since last visit.  - Current weight fluctuates around 250 lbs at home.  - On Wegovy for weight management; insurance covers the medication.  - Missed one week of Wegovy without noticeable changes in appetite.  - Reports a side effect of needing to use the bathroom urgently and in large amounts.    Shoulder Pain  - Previously experienced shoulder pain diagnosed as \"cold shoulder.\"  - Received a shot in the shoulder, which improved range of motion.  - Underwent physical therapy and exercises for shoulder recovery.  - Fell on a suitcase, causing chest pain and concern for a rib fracture, which ended therapy sessions.    Knee Pain  - Reports ongoing knee issues affecting mobility and exercise capability.  - Previously received a shot in the knee, which provided temporary relief.  - Long-term overweight status, weighing 200 lbs since chay high, contributing to knee strain.    Family History of Cancer  - Father  at age 63 from brain cancer (astrocytoma).  - Brother  at age 60 from cancer-related complications involving the heart.  - Uncle also  early; specific details not mentioned.    Genetic Testing  - Underwent genetic testing for MEN2 (Multiple Endocrine Neoplasia Type 2) and tested negative.  - Sister also tested negative for MEN2.    Social History  - Works as a  and deals with assumptions.  - Plans to retire at 63 but is concerned about financial implications.  - Family includes a son who is currently incarcerated.      Advance Care Planning    Discussed advance care planning with patient; informed AVS has link to [facility].        2025   General Health   How would you rate your overall physical health? (!) FAIR   Feel stress (tense, anxious, or unable to sleep) Only a little   (!) STRESS CONCERN      2025   Nutrition   Three or more servings of calcium each day? Yes   Diet: Regular " (no restrictions)   How many servings of fruit and vegetables per day? (!) 2-3   How many sweetened beverages each day? 0-1         6/5/2025   Exercise   Days per week of moderate/strenous exercise 0 days   Average minutes spent exercising at this level 0 min   (!) EXERCISE CONCERN      6/5/2025   Social Factors   Frequency of gathering with friends or relatives Once a week   Worry food won't last until get money to buy more No   Food not last or not have enough money for food? No   Do you have housing? (Housing is defined as stable permanent housing and does not include staying outside in a car, in a tent, in an abandoned building, in an overnight shelter, or couch-surfing.) Yes   Are you worried about losing your housing? No   Lack of transportation? No   Unable to get utilities (heat,electricity)? No         6/5/2025   Fall Risk   Fallen 2 or more times in the past year? No   Trouble with walking or balance? Yes   Gait Speed Test (Document in seconds) 3.56   Gait Speed Test Interpretation Less than or equal to 5.00 seconds - PASS          6/5/2025   Dental   Dentist two times every year? Yes         Today's PHQ-2 Score:       6/5/2025     2:50 PM   PHQ-2 ( 1999 [employer])   Q1: Little interest or pleasure in doing things 0   Q2: Feeling down, depressed or hopeless 0   PHQ-2 Score 0    Q1: Little interest or pleasure in doing things Not at all   Q2: Feeling down, depressed or hopeless Not at all   PHQ-2 Score 0       Patient-reported           6/5/2025   Substance Use   Alcohol more than 3/day or more than 7/wk No   Do you use any other substances recreationally? No     Social History     Tobacco Use    Smoking status: Never     Passive exposure: Never    Smokeless tobacco: Never   Vaping Use    Vaping status: Never Used   Substance Use Topics    Alcohol use: Yes     Comment: occ    Drug use: No           6/5/2025   STI Screening   New sexual partner(s) since last STI/HIV test? No   Last PSA:   Prostate Specific  "Antigen Screen   Date Value Ref Range Status   05/15/2024 0.31 0.00 - 3.50 ng/mL Final   01/13/2022 0.38 0.00 - 4.00 ug/L Final     ASCVD Risk   The ASCVD Risk score (Marci MCGOWAN, et al., 2019) failed to calculate for the following reasons:    The valid total cholesterol range is 130 to 320 mg/dL           Reviewed and updated as needed this visit by Provider    Allergies  Meds  Problems                    Objective    Exam  /80 (BP Location: Right arm, Patient Position: Sitting, Cuff Size: Adult Regular)   Pulse 73   Temp 97.5  F (36.4  C) (Temporal)   Resp 14   Ht 1.778 m (5' 10\")   Wt 117.2 kg (258 lb 4.8 oz)   SpO2 99%   BMI 37.06 kg/m     Estimated body mass index is 37.06 kg/m  as calculated from the following:    Height as of this encounter: 1.778 m (5' 10\").    Weight as of this encounter: 117.2 kg (258 lb 4.8 oz).    Physical Exam  GENERAL: alert and no distress  EYES: Eyes grossly normal to inspection, PERRL and conjunctivae and sclerae normal  HENT: ear canals and TM's normal, nose and mouth without ulcers or lesions  NECK: no adenopathy, no asymmetry, masses, or scars  RESP: lungs clear to auscultation - no rales, rhonchi or wheezes  CV: regular rate and rhythm, normal S1 S2, no S3 or S4, no murmur, click or rub, no peripheral edema  ABDOMEN: soft, nontender, no hepatosplenomegaly, no masses and bowel sounds normal  MS: no gross musculoskeletal defects noted, no edema  SKIN: no suspicious lesions or rashes  NEURO: Normal strength and tone, mentation intact and speech normal  PSYCH: mentation appears normal, affect normal/bright        Signed Electronically by: Anabel Abreu MD    "

## 2025-06-05 NOTE — PATIENT INSTRUCTIONS
Patient Education   Preventive Care Advice   This is general advice given by our system to help you stay healthy. However, your care team may have specific advice just for you. Please talk to your care team about your preventive care needs.  Nutrition  Eat 5 or more servings of fruits and vegetables each day.  Try wheat bread, brown rice and whole grain pasta (instead of white bread, rice, and pasta).  Get enough calcium and vitamin D. Check the label on foods and aim for 100% of the RDA (recommended daily allowance).  Lifestyle  Exercise at least 150 minutes each week  (30 minutes a day, 5 days a week).  Do muscle strengthening activities 2 days a week. These help control your weight and prevent disease.  No smoking.  Wear sunscreen to prevent skin cancer.  Have a dental exam and cleaning every 6 months.  Yearly exams  See your health care team every year to talk about:  Any changes in your health.  Any medicines your care team has prescribed.  Preventive care, family planning, and ways to prevent chronic diseases.  Shots (vaccines)   HPV shots (up to age 26), if you've never had them before.  Hepatitis B shots (up to age 59), if you've never had them before.  COVID-19 shot: Get this shot when it's due.  Flu shot: Get a flu shot every year.  Tetanus shot: Get a tetanus shot every 10 years.  Pneumococcal, hepatitis A, and RSV shots: Ask your care team if you need these based on your risk.  Shingles shot (for age 50 and up)  General health tests  Diabetes screening:  Starting at age 35, Get screened for diabetes at least every 3 years.  If you are younger than age 35, ask your care team if you should be screened for diabetes.  Cholesterol test: At age 39, start having a cholesterol test every 5 years, or more often if advised.  Bone density scan (DEXA): At age 50, ask your care team if you should have this scan for osteoporosis (brittle bones).  Hepatitis C: Get tested at least once in your life.  STIs (sexually  transmitted infections)  Before age 24: Ask your care team if you should be screened for STIs.  After age 24: Get screened for STIs if you're at risk. You are at risk for STIs (including HIV) if:  You are sexually active with more than one person.  You don't use condoms every time.  You or a partner was diagnosed with a sexually transmitted infection.  If you are at risk for HIV, ask about PrEP medicine to prevent HIV.  Get tested for HIV at least once in your life, whether you are at risk for HIV or not.  Cancer screening tests  Cervical cancer screening: If you have a cervix, begin getting regular cervical cancer screening tests starting at age 21.  Breast cancer scan (mammogram): If you've ever had breasts, begin having regular mammograms starting at age 40. This is a scan to check for breast cancer.  Colon cancer screening: It is important to start screening for colon cancer at age 45.  Have a colonoscopy test every 10 years (or more often if you're at risk) Or, ask your provider about stool tests like a FIT test every year or Cologuard test every 3 years.  To learn more about your testing options, visit:   .  For help making a decision, visit:   https://bit.ly/cj50622.  Prostate cancer screening test: If you have a prostate, ask your care team if a prostate cancer screening test (PSA) at age 55 is right for you.  Lung cancer screening: If you are a current or former smoker ages 50 to 80, ask your care team if ongoing lung cancer screenings are right for you.  For informational purposes only. Not to replace the advice of your health care provider. Copyright   2023 [facility]. All rights reserved. Clinically reviewed by the [facility]. Sobrr [id number] - REV 01/24.  Preventing Falls: Care Instructions  Injuries and health problems such as trouble walking or poor eyesight can increase your risk of falling. So can some medicines. But there are things you can do to help  "prevent falls. You can exercise to get stronger. You can also arrange your home to make it safer.    Talk to your doctor about the medicines you take. Ask if any of them increase the risk of falls and whether they can be changed or stopped.   Try to exercise regularly. It can help improve your strength and balance. This can help lower your risk of falling.         Practice fall safety and prevention.   Wear low-heeled shoes that fit well and give your feet good support. Talk to your doctor if you have foot problems that make this hard.  Carry a cellphone or wear a medical alert device that you can use to call for help.  Use stepladders instead of chairs to reach high objects. Don't climb if you're at risk for falls. Ask for help, if needed.  Wear the correct eyeglasses, if you need them.        Make your home safer.   Remove rugs, cords, clutter, and furniture from walkways.  Keep your house well lit. Use night-lights in hallways and bathrooms.  Install and use sturdy handrails on stairways.  Wear nonskid footwear, even inside. Don't walk barefoot or in socks without shoes.        Be safe outside.   Use handrails, curb cuts, and ramps whenever possible.  Keep your hands free by using a shoulder bag or backpack.  Try to walk in well-lit areas. Watch out for uneven ground, changes in pavement, and debris.  Be careful in the winter. Walk on the grass or gravel when sidewalks are slippery. Use de-icer on steps and walkways. Add non-slip devices to shoes.    Put grab bars and nonskid mats in your shower or tub and near the toilet. Try to use a shower chair or bath bench when bathing.   Get into a tub or shower by putting in your weaker leg first. Get out with your strong side first. Have a phone or medical alert device in the bathroom with you.   Where can you learn more?  Go to https://www.[employer].net/patiented  Enter G117 in the search box to learn more about \"Preventing Falls: Care Instructions.\"  Current as of: " July 31, 2024  Content Version: 14.4    4696-0496 [employer].   Care instructions adapted under license by your healthcare professional. If you have questions about a medical condition or this instruction, always ask your healthcare professional. [employer] disclaims any warranty or liability for your use of this information.

## 2025-06-05 NOTE — NURSING NOTE
Prior to immunization administration, verified patients identity using patient s name and date of birth. Please see Immunization Activity for additional information.     Screening Questionnaire for Adult Immunization    Are you sick today?   No   Do you have allergies to medications, food, a vaccine component or latex?   No   Have you ever had a serious reaction after receiving a vaccination?   No   Do you have a long-term health problem with heart, lung, kidney, or metabolic disease (e.g., diabetes), asthma, a blood disorder, no spleen, complement component deficiency, a cochlear implant, or a spinal fluid leak?  Are you on long-term aspirin therapy?   No   Do you have cancer, leukemia, HIV/AIDS, or any other immune system problem?   No   Do you have a parent, brother, or sister with an immune system problem?   No   In the past 3 months, have you taken medications that affect  your immune system, such as prednisone, other steroids, or anticancer drugs; drugs for the treatment of rheumatoid arthritis, Crohn s disease, or psoriasis; or have you had radiation treatments?   No   Have you had a seizure, or a brain or other nervous system problem?   No   During the past year, have you received a transfusion of blood or blood    products, or been given immune (gamma) globulin or antiviral drug?   No   For women: Are you pregnant or is there a chance you could become       pregnant during the next month?   No   Have you received any vaccinations in the past 4 weeks?   No     Immunization questionnaire answers were all negative.      Patient instructed to remain in clinic for 15 minutes afterwards, and to report any adverse reactions.     Screening performed by FRANCIA MURRY on 6/5/2025 at 3:41 PM.

## 2025-06-09 ENCOUNTER — RESULTS FOLLOW-UP (OUTPATIENT)
Dept: FAMILY MEDICINE | Facility: CLINIC | Age: 60
End: 2025-06-09

## 2025-06-12 ENCOUNTER — TELEPHONE (OUTPATIENT)
Dept: FAMILY MEDICINE | Facility: CLINIC | Age: 60
End: 2025-06-12
Payer: COMMERCIAL

## 2025-08-09 DIAGNOSIS — E78.5 HYPERLIPIDEMIA, UNSPECIFIED: ICD-10-CM

## 2025-08-09 RX ORDER — ATORVASTATIN CALCIUM 40 MG/1
40 TABLET, FILM COATED ORAL DAILY
Qty: 90 TABLET | Refills: 4 | OUTPATIENT
Start: 2025-08-09

## (undated) DEVICE — GLOVE PROTEXIS W/NEU-THERA 7.0  2D73TE70

## (undated) DEVICE — SYR 03ML LL W/O NDL 309657

## (undated) DEVICE — TRAY PAIN INJECTION 97A 640

## (undated) DEVICE — NDL SPINAL 22GA 3.5" QUINCKE 405181

## (undated) DEVICE — PREP CHLORAPREP 26ML TINTED ORANGE  260815

## (undated) RX ORDER — IOPAMIDOL 408 MG/ML
INJECTION, SOLUTION INTRATHECAL
Status: DISPENSED
Start: 2021-07-30

## (undated) RX ORDER — LIDOCAINE HYDROCHLORIDE 10 MG/ML
INJECTION, SOLUTION EPIDURAL; INFILTRATION; INTRACAUDAL; PERINEURAL
Status: DISPENSED
Start: 2021-07-30

## (undated) RX ORDER — TRIAMCINOLONE ACETONIDE 40 MG/ML
INJECTION, SUSPENSION INTRA-ARTICULAR; INTRAMUSCULAR
Status: DISPENSED
Start: 2021-07-30